# Patient Record
Sex: FEMALE | Race: WHITE | Employment: FULL TIME | URBAN - METROPOLITAN AREA
[De-identification: names, ages, dates, MRNs, and addresses within clinical notes are randomized per-mention and may not be internally consistent; named-entity substitution may affect disease eponyms.]

---

## 2017-02-01 ENCOUNTER — GENERIC CONVERSION - ENCOUNTER (OUTPATIENT)
Dept: OTHER | Facility: OTHER | Age: 32
End: 2017-02-01

## 2017-02-01 DIAGNOSIS — Z00.00 ENCOUNTER FOR GENERAL ADULT MEDICAL EXAMINATION WITHOUT ABNORMAL FINDINGS: ICD-10-CM

## 2017-02-01 DIAGNOSIS — Z01.818 ENCOUNTER FOR OTHER PREPROCEDURAL EXAMINATION: ICD-10-CM

## 2017-02-01 DIAGNOSIS — F32.9 MAJOR DEPRESSIVE DISORDER, SINGLE EPISODE: ICD-10-CM

## 2017-02-10 RX ORDER — ACETAMINOPHEN, ASPIRIN AND CAFFEINE 250; 250; 65 MG/1; MG/1; MG/1
1 TABLET, FILM COATED ORAL EVERY 6 HOURS PRN
COMMUNITY
End: 2017-03-03 | Stop reason: HOSPADM

## 2017-02-20 ENCOUNTER — APPOINTMENT (OUTPATIENT)
Dept: LAB | Facility: HOSPITAL | Age: 32
End: 2017-02-20
Payer: COMMERCIAL

## 2017-02-20 ENCOUNTER — HOSPITAL ENCOUNTER (OUTPATIENT)
Dept: NON INVASIVE DIAGNOSTICS | Facility: HOSPITAL | Age: 32
Discharge: HOME | End: 2017-02-20
Payer: COMMERCIAL

## 2017-02-20 ENCOUNTER — TRANSCRIBE ORDERS (OUTPATIENT)
Dept: ADMINISTRATIVE | Facility: HOSPITAL | Age: 32
End: 2017-02-20

## 2017-02-20 ENCOUNTER — ALLSCRIPTS OFFICE VISIT (OUTPATIENT)
Dept: FAMILY MEDICINE CLINIC | Facility: CLINIC | Age: 32
End: 2017-02-20
Payer: COMMERCIAL

## 2017-02-20 ENCOUNTER — HOSPITAL ENCOUNTER (OUTPATIENT)
Dept: RADIOLOGY | Facility: HOSPITAL | Age: 32
Discharge: HOME | End: 2017-02-20
Payer: COMMERCIAL

## 2017-02-20 DIAGNOSIS — Z01.818 OTHER SPECIFIED PRE-OPERATIVE EXAMINATION: Primary | ICD-10-CM

## 2017-02-20 DIAGNOSIS — Z01.818 ENCOUNTER FOR OTHER PREPROCEDURAL EXAMINATION: ICD-10-CM

## 2017-02-20 DIAGNOSIS — Z01.818 OTHER SPECIFIED PRE-OPERATIVE EXAMINATION: ICD-10-CM

## 2017-02-20 LAB
ANION GAP SERPL CALCULATED.3IONS-SCNC: 10 MMOL/L (ref 4–13)
APTT PPP: 34 SECONDS (ref 24–36)
BILIRUB UR QL STRIP: NEGATIVE
BUN SERPL-MCNC: 7 MG/DL (ref 5–25)
CALCIUM SERPL-MCNC: 8.8 MG/DL (ref 8.3–10.1)
CHLORIDE SERPL-SCNC: 102 MMOL/L (ref 100–108)
CLARITY UR: NORMAL
CO2 SERPL-SCNC: 27 MMOL/L (ref 21–32)
COLOR UR: YELLOW
CREAT SERPL-MCNC: 0.65 MG/DL (ref 0.6–1.3)
ERYTHROCYTE [DISTWIDTH] IN BLOOD BY AUTOMATED COUNT: 12.8 % (ref 11.6–15.1)
GFR SERPL CREATININE-BSD FRML MDRD: >60 ML/MIN/1.73SQ M
GLUCOSE SERPL-MCNC: 112 MG/DL (ref 65–140)
GLUCOSE UR STRIP-MCNC: NEGATIVE MG/DL
HCT VFR BLD AUTO: 40.4 % (ref 34.8–46.1)
HGB BLD-MCNC: 13.2 G/DL (ref 11.5–15.4)
HGB UR QL STRIP.AUTO: NEGATIVE
INR PPP: 1.08 (ref 0.86–1.16)
KETONES UR STRIP-MCNC: NEGATIVE MG/DL
LEUKOCYTE ESTERASE UR QL STRIP: NEGATIVE
MCH RBC QN AUTO: 27.6 PG (ref 26.8–34.3)
MCHC RBC AUTO-ENTMCNC: 32.7 G/DL (ref 31.4–37.4)
MCV RBC AUTO: 84 FL (ref 82–98)
NITRITE UR QL STRIP: NEGATIVE
PH UR STRIP.AUTO: 6.5 [PH] (ref 4.5–8)
PLATELET # BLD AUTO: 277 THOUSANDS/UL (ref 149–390)
PMV BLD AUTO: 12.1 FL (ref 8.9–12.7)
POTASSIUM SERPL-SCNC: 4.8 MMOL/L (ref 3.5–5.3)
PROT UR STRIP-MCNC: NEGATIVE MG/DL
PROTHROMBIN TIME: 13.7 SECONDS (ref 12–14.3)
RBC # BLD AUTO: 4.79 MILLION/UL (ref 3.81–5.12)
SODIUM SERPL-SCNC: 139 MMOL/L (ref 136–145)
SP GR UR STRIP.AUTO: 1.01 (ref 1–1.03)
UROBILINOGEN UR QL STRIP.AUTO: 0.2 E.U./DL
WBC # BLD AUTO: 12.35 THOUSAND/UL (ref 4.31–10.16)

## 2017-02-20 PROCEDURE — 81003 URINALYSIS AUTO W/O SCOPE: CPT

## 2017-02-20 PROCEDURE — 80048 BASIC METABOLIC PNL TOTAL CA: CPT

## 2017-02-20 PROCEDURE — 93005 ELECTROCARDIOGRAM TRACING: CPT

## 2017-02-20 PROCEDURE — 85027 COMPLETE CBC AUTOMATED: CPT

## 2017-02-20 PROCEDURE — 71020 HB CHEST X-RAY 2VW FRONTAL&LATL: CPT

## 2017-02-20 PROCEDURE — 85610 PROTHROMBIN TIME: CPT

## 2017-02-20 PROCEDURE — T1015 CLINIC SERVICE: HCPCS | Performed by: NURSE PRACTITIONER

## 2017-02-20 PROCEDURE — 36415 COLL VENOUS BLD VENIPUNCTURE: CPT

## 2017-02-20 PROCEDURE — 85730 THROMBOPLASTIN TIME PARTIAL: CPT

## 2017-02-21 ENCOUNTER — ANESTHESIA EVENT (OUTPATIENT)
Dept: PERIOP | Facility: HOSPITAL | Age: 32
DRG: 021 | End: 2017-02-21
Payer: COMMERCIAL

## 2017-02-21 LAB
ATRIAL RATE: 78 BPM
P AXIS: -7 DEGREES
PR INTERVAL: 136 MS
QRS AXIS: 70 DEGREES
QRSD INTERVAL: 106 MS
QT INTERVAL: 370 MS
QTC INTERVAL: 421 MS
T WAVE AXIS: 19 DEGREES
VENTRICULAR RATE: 78 BPM

## 2017-02-22 ENCOUNTER — TRANSCRIBE ORDERS (OUTPATIENT)
Dept: ADMINISTRATIVE | Facility: HOSPITAL | Age: 32
End: 2017-02-22

## 2017-02-22 ENCOUNTER — APPOINTMENT (OUTPATIENT)
Dept: LAB | Facility: HOSPITAL | Age: 32
End: 2017-02-22
Attending: NEUROLOGICAL SURGERY
Payer: COMMERCIAL

## 2017-02-22 DIAGNOSIS — D33.3 BENIGN NEOPLASM OF CRANIAL NERVES (HCC): ICD-10-CM

## 2017-02-22 DIAGNOSIS — F32.9 MAJOR DEPRESSIVE DISORDER, SINGLE EPISODE: ICD-10-CM

## 2017-02-22 DIAGNOSIS — Z30.9 ENCOUNTER FOR CONTRACEPTIVE MANAGEMENT: ICD-10-CM

## 2017-02-22 DIAGNOSIS — G93.0 CEREBRAL CYSTS: ICD-10-CM

## 2017-02-22 DIAGNOSIS — Z00.00 ENCOUNTER FOR GENERAL ADULT MEDICAL EXAMINATION WITHOUT ABNORMAL FINDINGS: ICD-10-CM

## 2017-02-22 DIAGNOSIS — G93.0 CEREBRAL CYSTS: Primary | ICD-10-CM

## 2017-02-22 LAB
ALBUMIN SERPL BCP-MCNC: 3.5 G/DL (ref 3.5–5)
ALP SERPL-CCNC: 96 U/L (ref 46–116)
ALT SERPL W P-5'-P-CCNC: 15 U/L (ref 12–78)
ANION GAP SERPL CALCULATED.3IONS-SCNC: 9 MMOL/L (ref 4–13)
AST SERPL W P-5'-P-CCNC: 11 U/L (ref 5–45)
BILIRUB SERPL-MCNC: 0.3 MG/DL (ref 0.2–1)
BILIRUB UR QL STRIP: NEGATIVE
BUN SERPL-MCNC: 10 MG/DL (ref 5–25)
CALCIUM SERPL-MCNC: 8.5 MG/DL (ref 8.3–10.1)
CHLORIDE SERPL-SCNC: 102 MMOL/L (ref 100–108)
CLARITY UR: CLEAR
CO2 SERPL-SCNC: 28 MMOL/L (ref 21–32)
COLOR UR: YELLOW
CREAT SERPL-MCNC: 0.67 MG/DL (ref 0.6–1.3)
EST. AVERAGE GLUCOSE BLD GHB EST-MCNC: 126 MG/DL
GFR SERPL CREATININE-BSD FRML MDRD: >60 ML/MIN/1.73SQ M
GLUCOSE SERPL-MCNC: 100 MG/DL (ref 65–140)
GLUCOSE UR STRIP-MCNC: NEGATIVE MG/DL
HBA1C MFR BLD: 6 % (ref 4.2–6.3)
HCG SERPL QL: NEGATIVE
HGB UR QL STRIP.AUTO: NEGATIVE
KETONES UR STRIP-MCNC: NEGATIVE MG/DL
LEUKOCYTE ESTERASE UR QL STRIP: NEGATIVE
NITRITE UR QL STRIP: NEGATIVE
PH UR STRIP.AUTO: 6.5 [PH] (ref 4.5–8)
POTASSIUM SERPL-SCNC: 3.9 MMOL/L (ref 3.5–5.3)
PROT SERPL-MCNC: 6.6 G/DL (ref 6.4–8.2)
PROT UR STRIP-MCNC: NEGATIVE MG/DL
SODIUM SERPL-SCNC: 139 MMOL/L (ref 136–145)
SP GR UR STRIP.AUTO: 1.02 (ref 1–1.03)
UROBILINOGEN UR QL STRIP.AUTO: 0.2 E.U./DL

## 2017-02-22 PROCEDURE — 86900 BLOOD TYPING SEROLOGIC ABO: CPT | Performed by: NEUROLOGICAL SURGERY

## 2017-02-22 PROCEDURE — 80053 COMPREHEN METABOLIC PANEL: CPT

## 2017-02-22 PROCEDURE — 83036 HEMOGLOBIN GLYCOSYLATED A1C: CPT

## 2017-02-22 PROCEDURE — 84703 CHORIONIC GONADOTROPIN ASSAY: CPT

## 2017-02-22 PROCEDURE — 86901 BLOOD TYPING SEROLOGIC RH(D): CPT | Performed by: NEUROLOGICAL SURGERY

## 2017-02-22 PROCEDURE — 86850 RBC ANTIBODY SCREEN: CPT | Performed by: NEUROLOGICAL SURGERY

## 2017-02-22 PROCEDURE — 81003 URINALYSIS AUTO W/O SCOPE: CPT

## 2017-02-22 PROCEDURE — 36415 COLL VENOUS BLD VENIPUNCTURE: CPT

## 2017-02-23 ENCOUNTER — LAB REQUISITION (OUTPATIENT)
Dept: LAB | Facility: HOSPITAL | Age: 32
End: 2017-02-23
Payer: COMMERCIAL

## 2017-02-23 DIAGNOSIS — Z01.818 ENCOUNTER FOR OTHER PREPROCEDURAL EXAMINATION: ICD-10-CM

## 2017-02-23 LAB
ABO GROUP BLD: NORMAL
BLD GP AB SCN SERPL QL: NEGATIVE
RH BLD: POSITIVE

## 2017-02-28 ENCOUNTER — GENERIC CONVERSION - ENCOUNTER (OUTPATIENT)
Dept: OTHER | Facility: OTHER | Age: 32
End: 2017-02-28

## 2017-03-01 ENCOUNTER — APPOINTMENT (INPATIENT)
Dept: RADIOLOGY | Facility: HOSPITAL | Age: 32
DRG: 021 | End: 2017-03-01
Payer: COMMERCIAL

## 2017-03-01 ENCOUNTER — GENERIC CONVERSION - ENCOUNTER (OUTPATIENT)
Dept: OTHER | Facility: OTHER | Age: 32
End: 2017-03-01

## 2017-03-01 ENCOUNTER — ANESTHESIA (OUTPATIENT)
Dept: PERIOP | Facility: HOSPITAL | Age: 32
DRG: 021 | End: 2017-03-01
Payer: COMMERCIAL

## 2017-03-01 ENCOUNTER — HOSPITAL ENCOUNTER (INPATIENT)
Facility: HOSPITAL | Age: 32
LOS: 2 days | Discharge: HOME | DRG: 021 | End: 2017-03-03
Attending: NEUROLOGICAL SURGERY | Admitting: NEUROLOGICAL SURGERY
Payer: COMMERCIAL

## 2017-03-01 DIAGNOSIS — G93.0 CEREBRAL CYST: Primary | ICD-10-CM

## 2017-03-01 LAB
BASE EXCESS BLDA CALC-SCNC: -3 MMOL/L (ref -2–3)
CA-I BLD-SCNC: 1 MMOL/L (ref 1.12–1.32)
ERYTHROCYTE [DISTWIDTH] IN BLOOD BY AUTOMATED COUNT: 13.2 % (ref 11.6–15.1)
EXT PREGNANCY TEST URINE: NEGATIVE
GLUCOSE SERPL-MCNC: 162 MG/DL (ref 65–140)
GLUCOSE SERPL-MCNC: 163 MG/DL (ref 65–140)
GLUCOSE SERPL-MCNC: 207 MG/DL (ref 65–140)
HCO3 BLDA-SCNC: 21.7 MMOL/L (ref 24–30)
HCT VFR BLD AUTO: 31.7 % (ref 34.8–46.1)
HCT VFR BLD CALC: 27 % (ref 34.8–46.1)
HGB BLD-MCNC: 10.2 G/DL (ref 11.5–15.4)
HGB BLDA-MCNC: 9.2 G/DL (ref 11.5–15.4)
MCH RBC QN AUTO: 27.1 PG (ref 26.8–34.3)
MCHC RBC AUTO-ENTMCNC: 32.2 G/DL (ref 31.4–37.4)
MCV RBC AUTO: 84 FL (ref 82–98)
PCO2 BLD: 23 MMOL/L (ref 21–32)
PCO2 BLD: 38.3 MM HG (ref 42–50)
PH BLD: 7.36 [PH] (ref 7.3–7.4)
PLATELET # BLD AUTO: 217 THOUSANDS/UL (ref 149–390)
PLATELET # BLD AUTO: 277 THOUSANDS/UL (ref 149–390)
PMV BLD AUTO: 11.5 FL (ref 8.9–12.7)
PMV BLD AUTO: 11.6 FL (ref 8.9–12.7)
PO2 BLD: 214 MM HG (ref 35–45)
POTASSIUM BLD-SCNC: 4.2 MMOL/L (ref 3.5–5.3)
RBC # BLD AUTO: 3.76 MILLION/UL (ref 3.81–5.12)
SAO2 % BLD FROM PO2: 100 % (ref 95–98)
SODIUM BLD-SCNC: 136 MMOL/L (ref 136–145)
SPECIMEN SOURCE: ABNORMAL
WBC # BLD AUTO: 17.05 THOUSAND/UL (ref 4.31–10.16)

## 2017-03-01 PROCEDURE — 84132 ASSAY OF SERUM POTASSIUM: CPT

## 2017-03-01 PROCEDURE — C1713 ANCHOR/SCREW BN/BN,TIS/BN: HCPCS | Performed by: NEUROLOGICAL SURGERY

## 2017-03-01 PROCEDURE — 82948 REAGENT STRIP/BLOOD GLUCOSE: CPT

## 2017-03-01 PROCEDURE — 81025 URINE PREGNANCY TEST: CPT | Performed by: STUDENT IN AN ORGANIZED HEALTH CARE EDUCATION/TRAINING PROGRAM

## 2017-03-01 PROCEDURE — 85014 HEMATOCRIT: CPT

## 2017-03-01 PROCEDURE — 82803 BLOOD GASES ANY COMBINATION: CPT

## 2017-03-01 PROCEDURE — 009500Z DRAINAGE OF INTRACRANIAL SUBARACHNOID SPACE WITH DRAINAGE DEVICE, OPEN APPROACH: ICD-10-PCS | Performed by: NEUROLOGICAL SURGERY

## 2017-03-01 PROCEDURE — 85049 AUTOMATED PLATELET COUNT: CPT | Performed by: NEUROLOGICAL SURGERY

## 2017-03-01 PROCEDURE — 86920 COMPATIBILITY TEST SPIN: CPT

## 2017-03-01 PROCEDURE — 85027 COMPLETE CBC AUTOMATED: CPT | Performed by: NURSE ANESTHETIST, CERTIFIED REGISTERED

## 2017-03-01 PROCEDURE — 82947 ASSAY GLUCOSE BLOOD QUANT: CPT

## 2017-03-01 PROCEDURE — 70450 CT HEAD/BRAIN W/O DYE: CPT

## 2017-03-01 PROCEDURE — 82330 ASSAY OF CALCIUM: CPT

## 2017-03-01 PROCEDURE — 84295 ASSAY OF SERUM SODIUM: CPT

## 2017-03-01 DEVICE — IMPLANTABLE DEVICE
Type: IMPLANTABLE DEVICE | Site: CRANIAL | Status: FUNCTIONAL
Brand: THINFLAP

## 2017-03-01 DEVICE — IMPLANTABLE DEVICE
Type: IMPLANTABLE DEVICE | Site: CRANIAL | Status: FUNCTIONAL
Brand: THINFLAP SYSTEM

## 2017-03-01 RX ORDER — HYDROMORPHONE HYDROCHLORIDE 2 MG/ML
INJECTION, SOLUTION INTRAMUSCULAR; INTRAVENOUS; SUBCUTANEOUS AS NEEDED
Status: DISCONTINUED | OUTPATIENT
Start: 2017-03-01 | End: 2017-03-01 | Stop reason: SURG

## 2017-03-01 RX ORDER — FENTANYL CITRATE 50 UG/ML
50 INJECTION, SOLUTION INTRAMUSCULAR; INTRAVENOUS
Status: DISCONTINUED | OUTPATIENT
Start: 2017-03-01 | End: 2017-03-02

## 2017-03-01 RX ORDER — FENTANYL CITRATE/PF 50 MCG/ML
50 SYRINGE (ML) INJECTION
Status: DISCONTINUED | OUTPATIENT
Start: 2017-03-01 | End: 2017-03-01 | Stop reason: HOSPADM

## 2017-03-01 RX ORDER — LIDOCAINE HYDROCHLORIDE 10 MG/ML
INJECTION, SOLUTION INFILTRATION; PERINEURAL AS NEEDED
Status: DISCONTINUED | OUTPATIENT
Start: 2017-03-01 | End: 2017-03-01 | Stop reason: HOSPADM

## 2017-03-01 RX ORDER — MAGNESIUM HYDROXIDE 1200 MG/15ML
LIQUID ORAL AS NEEDED
Status: DISCONTINUED | OUTPATIENT
Start: 2017-03-01 | End: 2017-03-01 | Stop reason: HOSPADM

## 2017-03-01 RX ORDER — LIDOCAINE HYDROCHLORIDE AND EPINEPHRINE 10; 10 MG/ML; UG/ML
INJECTION, SOLUTION INFILTRATION; PERINEURAL AS NEEDED
Status: DISCONTINUED | OUTPATIENT
Start: 2017-03-01 | End: 2017-03-01 | Stop reason: HOSPADM

## 2017-03-01 RX ORDER — DEXAMETHASONE 4 MG/1
4 TABLET ORAL EVERY 8 HOURS SCHEDULED
Status: DISCONTINUED | OUTPATIENT
Start: 2017-03-03 | End: 2017-03-03 | Stop reason: HOSPADM

## 2017-03-01 RX ORDER — ACETAMINOPHEN 325 MG/1
975 TABLET ORAL ONCE
Status: COMPLETED | OUTPATIENT
Start: 2017-03-01 | End: 2017-03-01

## 2017-03-01 RX ORDER — DEXAMETHASONE 2 MG/1
2 TABLET ORAL EVERY 6 HOURS SCHEDULED
Status: DISCONTINUED | OUTPATIENT
Start: 2017-03-05 | End: 2017-03-03 | Stop reason: HOSPADM

## 2017-03-01 RX ORDER — ONDANSETRON 2 MG/ML
INJECTION INTRAMUSCULAR; INTRAVENOUS AS NEEDED
Status: DISCONTINUED | OUTPATIENT
Start: 2017-03-01 | End: 2017-03-01 | Stop reason: SURG

## 2017-03-01 RX ORDER — ESMOLOL HYDROCHLORIDE 10 MG/ML
INJECTION INTRAVENOUS AS NEEDED
Status: DISCONTINUED | OUTPATIENT
Start: 2017-03-01 | End: 2017-03-01 | Stop reason: SURG

## 2017-03-01 RX ORDER — OXYCODONE HYDROCHLORIDE 10 MG/1
10 TABLET ORAL EVERY 4 HOURS PRN
Status: DISCONTINUED | OUTPATIENT
Start: 2017-03-01 | End: 2017-03-02

## 2017-03-01 RX ORDER — SODIUM CHLORIDE 9 MG/ML
75 INJECTION, SOLUTION INTRAVENOUS CONTINUOUS
Status: DISCONTINUED | OUTPATIENT
Start: 2017-03-01 | End: 2017-03-02

## 2017-03-01 RX ORDER — LIDOCAINE HYDROCHLORIDE 10 MG/ML
INJECTION, SOLUTION INFILTRATION; PERINEURAL AS NEEDED
Status: DISCONTINUED | OUTPATIENT
Start: 2017-03-01 | End: 2017-03-01 | Stop reason: SURG

## 2017-03-01 RX ORDER — PROPOFOL 10 MG/ML
INJECTION, EMULSION INTRAVENOUS AS NEEDED
Status: DISCONTINUED | OUTPATIENT
Start: 2017-03-01 | End: 2017-03-01 | Stop reason: SURG

## 2017-03-01 RX ORDER — OXYCODONE HCL 10 MG/1
10 TABLET, FILM COATED, EXTENDED RELEASE ORAL ONCE
Status: COMPLETED | OUTPATIENT
Start: 2017-03-01 | End: 2017-03-01

## 2017-03-01 RX ORDER — DEXAMETHASONE 2 MG/1
2 TABLET ORAL
Status: DISCONTINUED | OUTPATIENT
Start: 2017-03-11 | End: 2017-03-03 | Stop reason: HOSPADM

## 2017-03-01 RX ORDER — SODIUM CHLORIDE, SODIUM LACTATE, POTASSIUM CHLORIDE, CALCIUM CHLORIDE 600; 310; 30; 20 MG/100ML; MG/100ML; MG/100ML; MG/100ML
125 INJECTION, SOLUTION INTRAVENOUS CONTINUOUS
Status: DISCONTINUED | OUTPATIENT
Start: 2017-03-01 | End: 2017-03-01

## 2017-03-01 RX ORDER — ACETAMINOPHEN 325 MG/1
975 TABLET ORAL EVERY 8 HOURS SCHEDULED
Status: DISCONTINUED | OUTPATIENT
Start: 2017-03-01 | End: 2017-03-03

## 2017-03-01 RX ORDER — ROCURONIUM BROMIDE 10 MG/ML
INJECTION, SOLUTION INTRAVENOUS AS NEEDED
Status: DISCONTINUED | OUTPATIENT
Start: 2017-03-01 | End: 2017-03-01 | Stop reason: SURG

## 2017-03-01 RX ORDER — PROMETHAZINE HYDROCHLORIDE 25 MG/ML
6.25 INJECTION, SOLUTION INTRAMUSCULAR; INTRAVENOUS AS NEEDED
Status: DISCONTINUED | OUTPATIENT
Start: 2017-03-01 | End: 2017-03-01 | Stop reason: HOSPADM

## 2017-03-01 RX ORDER — ALBUTEROL SULFATE 2.5 MG/3ML
2.5 SOLUTION RESPIRATORY (INHALATION) AS NEEDED
Status: DISCONTINUED | OUTPATIENT
Start: 2017-03-01 | End: 2017-03-01 | Stop reason: HOSPADM

## 2017-03-01 RX ORDER — DEXAMETHASONE 4 MG/1
4 TABLET ORAL EVERY 6 HOURS SCHEDULED
Status: COMPLETED | OUTPATIENT
Start: 2017-03-01 | End: 2017-03-03

## 2017-03-01 RX ORDER — DEXAMETHASONE 2 MG/1
2 TABLET ORAL EVERY 8 HOURS SCHEDULED
Status: DISCONTINUED | OUTPATIENT
Start: 2017-03-07 | End: 2017-03-03 | Stop reason: HOSPADM

## 2017-03-01 RX ORDER — SUCCINYLCHOLINE CHLORIDE 20 MG/ML
INJECTION INTRAMUSCULAR; INTRAVENOUS AS NEEDED
Status: DISCONTINUED | OUTPATIENT
Start: 2017-03-01 | End: 2017-03-01 | Stop reason: SURG

## 2017-03-01 RX ORDER — ONDANSETRON 2 MG/ML
4 INJECTION INTRAMUSCULAR; INTRAVENOUS EVERY 4 HOURS PRN
Status: DISCONTINUED | OUTPATIENT
Start: 2017-03-01 | End: 2017-03-02

## 2017-03-01 RX ORDER — IBUPROFEN 800 MG/1
800 TABLET ORAL EVERY 6 HOURS PRN
COMMUNITY
End: 2017-03-03 | Stop reason: HOSPADM

## 2017-03-01 RX ORDER — DIAZEPAM 2 MG/1
2 TABLET ORAL 4 TIMES DAILY
Status: DISCONTINUED | OUTPATIENT
Start: 2017-03-01 | End: 2017-03-03

## 2017-03-01 RX ORDER — DIAZEPAM 10 MG/1
10 TABLET ORAL EVERY 6 HOURS PRN
COMMUNITY
End: 2017-03-03 | Stop reason: HOSPADM

## 2017-03-01 RX ORDER — SODIUM CHLORIDE 9 MG/ML
INJECTION, SOLUTION INTRAVENOUS CONTINUOUS PRN
Status: DISCONTINUED | OUTPATIENT
Start: 2017-03-01 | End: 2017-03-01 | Stop reason: SURG

## 2017-03-01 RX ORDER — BISACODYL 10 MG
10 SUPPOSITORY, RECTAL RECTAL AS NEEDED
Status: DISCONTINUED | OUTPATIENT
Start: 2017-03-01 | End: 2017-03-02

## 2017-03-01 RX ORDER — PREGABALIN 75 MG/1
150 CAPSULE ORAL ONCE
Status: COMPLETED | OUTPATIENT
Start: 2017-03-01 | End: 2017-03-01

## 2017-03-01 RX ORDER — BUPIVACAINE HYDROCHLORIDE AND EPINEPHRINE 5; 5 MG/ML; UG/ML
INJECTION, SOLUTION EPIDURAL; INTRACAUDAL; PERINEURAL AS NEEDED
Status: DISCONTINUED | OUTPATIENT
Start: 2017-03-01 | End: 2017-03-01 | Stop reason: HOSPADM

## 2017-03-01 RX ORDER — METOCLOPRAMIDE HYDROCHLORIDE 5 MG/ML
10 INJECTION INTRAMUSCULAR; INTRAVENOUS EVERY 6 HOURS PRN
Status: DISCONTINUED | OUTPATIENT
Start: 2017-03-01 | End: 2017-03-01 | Stop reason: HOSPADM

## 2017-03-01 RX ORDER — GABAPENTIN 300 MG/1
300 CAPSULE ORAL 3 TIMES DAILY
Status: DISCONTINUED | OUTPATIENT
Start: 2017-03-01 | End: 2017-03-03 | Stop reason: HOSPADM

## 2017-03-01 RX ORDER — CHLORHEXIDINE GLUCONATE 0.12 MG/ML
15 RINSE ORAL ONCE
Status: COMPLETED | OUTPATIENT
Start: 2017-03-01 | End: 2017-03-01

## 2017-03-01 RX ORDER — DOCUSATE SODIUM 100 MG/1
100 CAPSULE, LIQUID FILLED ORAL 2 TIMES DAILY
Status: DISCONTINUED | OUTPATIENT
Start: 2017-03-01 | End: 2017-03-03 | Stop reason: HOSPADM

## 2017-03-01 RX ORDER — DEXAMETHASONE 2 MG/1
2 TABLET ORAL EVERY 12 HOURS SCHEDULED
Status: DISCONTINUED | OUTPATIENT
Start: 2017-03-09 | End: 2017-03-03 | Stop reason: HOSPADM

## 2017-03-01 RX ORDER — ALBUMIN, HUMAN INJ 5% 5 %
SOLUTION INTRAVENOUS CONTINUOUS PRN
Status: DISCONTINUED | OUTPATIENT
Start: 2017-03-01 | End: 2017-03-01 | Stop reason: SURG

## 2017-03-01 RX ORDER — HEPARIN SODIUM 5000 [USP'U]/ML
5000 INJECTION, SOLUTION INTRAVENOUS; SUBCUTANEOUS EVERY 8 HOURS SCHEDULED
Status: DISCONTINUED | OUTPATIENT
Start: 2017-03-02 | End: 2017-03-03 | Stop reason: HOSPADM

## 2017-03-01 RX ORDER — LABETALOL HYDROCHLORIDE 5 MG/ML
INJECTION, SOLUTION INTRAVENOUS AS NEEDED
Status: DISCONTINUED | OUTPATIENT
Start: 2017-03-01 | End: 2017-03-01 | Stop reason: SURG

## 2017-03-01 RX ORDER — ONDANSETRON 2 MG/ML
4 INJECTION INTRAMUSCULAR; INTRAVENOUS ONCE AS NEEDED
Status: COMPLETED | OUTPATIENT
Start: 2017-03-01 | End: 2017-03-01

## 2017-03-01 RX ADMIN — GABAPENTIN 300 MG: 300 CAPSULE ORAL at 15:06

## 2017-03-01 RX ADMIN — SODIUM CHLORIDE: 0.9 INJECTION, SOLUTION INTRAVENOUS at 08:45

## 2017-03-01 RX ADMIN — OXYCODONE HYDROCHLORIDE 10 MG: 10 TABLET ORAL at 21:07

## 2017-03-01 RX ADMIN — SODIUM CHLORIDE 75 ML/HR: 0.9 INJECTION, SOLUTION INTRAVENOUS at 13:30

## 2017-03-01 RX ADMIN — ALBUMIN HUMAN: 0.05 INJECTION, SOLUTION INTRAVENOUS at 10:49

## 2017-03-01 RX ADMIN — ACETAMINOPHEN 975 MG: 325 TABLET, FILM COATED ORAL at 07:25

## 2017-03-01 RX ADMIN — HYDROMORPHONE HYDROCHLORIDE 0.2 MG: 2 INJECTION, SOLUTION INTRAMUSCULAR; INTRAVENOUS; SUBCUTANEOUS at 11:14

## 2017-03-01 RX ADMIN — HYDROMORPHONE HYDROCHLORIDE 0.2 MG: 2 INJECTION, SOLUTION INTRAMUSCULAR; INTRAVENOUS; SUBCUTANEOUS at 11:42

## 2017-03-01 RX ADMIN — DIAZEPAM 2 MG: 2 TABLET ORAL at 20:59

## 2017-03-01 RX ADMIN — INSULIN HUMAN 5 UNITS: 100 INJECTION, SOLUTION PARENTERAL at 12:30

## 2017-03-01 RX ADMIN — DOCUSATE SODIUM 100 MG: 100 CAPSULE, LIQUID FILLED ORAL at 17:24

## 2017-03-01 RX ADMIN — HYDROMORPHONE HYDROCHLORIDE 0.2 MG: 2 INJECTION, SOLUTION INTRAMUSCULAR; INTRAVENOUS; SUBCUTANEOUS at 08:45

## 2017-03-01 RX ADMIN — ONDANSETRON 4 MG: 2 INJECTION INTRAMUSCULAR; INTRAVENOUS at 12:43

## 2017-03-01 RX ADMIN — DEXAMETHASONE SODIUM PHOSPHATE 10 MG: 10 INJECTION INTRAMUSCULAR; INTRAVENOUS at 09:25

## 2017-03-01 RX ADMIN — DIAZEPAM 2 MG: 2 TABLET ORAL at 15:06

## 2017-03-01 RX ADMIN — GABAPENTIN 300 MG: 300 CAPSULE ORAL at 21:06

## 2017-03-01 RX ADMIN — ESMOLOL HYDROCHLORIDE 20 MG: 100 INJECTION, SOLUTION INTRAVENOUS at 09:43

## 2017-03-01 RX ADMIN — PROPOFOL 200 MG: 10 INJECTION, EMULSION INTRAVENOUS at 08:36

## 2017-03-01 RX ADMIN — ONDANSETRON 4 MG: 2 INJECTION INTRAMUSCULAR; INTRAVENOUS at 21:05

## 2017-03-01 RX ADMIN — DEXAMETHASONE 4 MG: 4 TABLET ORAL at 21:00

## 2017-03-01 RX ADMIN — CEFAZOLIN SODIUM 2000 MG: 2 SOLUTION INTRAVENOUS at 09:25

## 2017-03-01 RX ADMIN — ALBUMIN HUMAN: 0.05 INJECTION, SOLUTION INTRAVENOUS at 10:23

## 2017-03-01 RX ADMIN — HYDROMORPHONE HYDROCHLORIDE 1 MG: 1 INJECTION, SOLUTION INTRAMUSCULAR; INTRAVENOUS; SUBCUTANEOUS at 18:24

## 2017-03-01 RX ADMIN — HYDROMORPHONE HYDROCHLORIDE 0.2 MG: 2 INJECTION, SOLUTION INTRAMUSCULAR; INTRAVENOUS; SUBCUTANEOUS at 11:27

## 2017-03-01 RX ADMIN — ALBUMIN HUMAN: 0.05 INJECTION, SOLUTION INTRAVENOUS at 10:10

## 2017-03-01 RX ADMIN — HYDROMORPHONE HYDROCHLORIDE 0.2 MG: 2 INJECTION, SOLUTION INTRAMUSCULAR; INTRAVENOUS; SUBCUTANEOUS at 11:29

## 2017-03-01 RX ADMIN — LIDOCAINE HYDROCHLORIDE 20 MG: 10 INJECTION, SOLUTION INFILTRATION; PERINEURAL at 08:36

## 2017-03-01 RX ADMIN — HYDROMORPHONE HYDROCHLORIDE 0.4 MG: 2 INJECTION, SOLUTION INTRAMUSCULAR; INTRAVENOUS; SUBCUTANEOUS at 08:36

## 2017-03-01 RX ADMIN — PROPOFOL 100 MG: 10 INJECTION, EMULSION INTRAVENOUS at 08:48

## 2017-03-01 RX ADMIN — NEOMYCIN AND POLYMYXIN B SULFATES: 40; 200000 SOLUTION IRRIGATION at 08:00

## 2017-03-01 RX ADMIN — ONDANSETRON 4 MG: 2 INJECTION INTRAMUSCULAR; INTRAVENOUS at 17:24

## 2017-03-01 RX ADMIN — HYDROMORPHONE HYDROCHLORIDE 0.4 MG: 2 INJECTION, SOLUTION INTRAMUSCULAR; INTRAVENOUS; SUBCUTANEOUS at 11:33

## 2017-03-01 RX ADMIN — OXYCODONE HYDROCHLORIDE 10 MG: 10 TABLET, FILM COATED, EXTENDED RELEASE ORAL at 08:20

## 2017-03-01 RX ADMIN — DEXAMETHASONE 4 MG: 4 TABLET ORAL at 15:06

## 2017-03-01 RX ADMIN — HYDROMORPHONE HYDROCHLORIDE 0.4 MG: 2 INJECTION, SOLUTION INTRAMUSCULAR; INTRAVENOUS; SUBCUTANEOUS at 08:48

## 2017-03-01 RX ADMIN — SODIUM CHLORIDE, SODIUM LACTATE, POTASSIUM CHLORIDE, AND CALCIUM CHLORIDE: .6; .31; .03; .02 INJECTION, SOLUTION INTRAVENOUS at 10:22

## 2017-03-01 RX ADMIN — FENTANYL CITRATE 50 MCG: 50 INJECTION INTRAMUSCULAR; INTRAVENOUS at 12:54

## 2017-03-01 RX ADMIN — HYDROMORPHONE HYDROCHLORIDE 0.4 MG: 2 INJECTION, SOLUTION INTRAMUSCULAR; INTRAVENOUS; SUBCUTANEOUS at 11:40

## 2017-03-01 RX ADMIN — ESMOLOL HYDROCHLORIDE 30 MG: 100 INJECTION, SOLUTION INTRAVENOUS at 11:00

## 2017-03-01 RX ADMIN — PREGABALIN 150 MG: 75 CAPSULE ORAL at 08:21

## 2017-03-01 RX ADMIN — LABETALOL HYDROCHLORIDE 10 MG: 5 INJECTION, SOLUTION INTRAVENOUS at 09:43

## 2017-03-01 RX ADMIN — CEFAZOLIN SODIUM 1000 MG: 1 SOLUTION INTRAVENOUS at 17:23

## 2017-03-01 RX ADMIN — SUCCINYLCHOLINE CHLORIDE 120 MG: 20 INJECTION, SOLUTION INTRAMUSCULAR; INTRAVENOUS at 08:36

## 2017-03-01 RX ADMIN — SODIUM CHLORIDE, SODIUM LACTATE, POTASSIUM CHLORIDE, AND CALCIUM CHLORIDE: .6; .31; .03; .02 INJECTION, SOLUTION INTRAVENOUS at 08:04

## 2017-03-01 RX ADMIN — HYDROMORPHONE HYDROCHLORIDE 0.4 MG: 2 INJECTION, SOLUTION INTRAMUSCULAR; INTRAVENOUS; SUBCUTANEOUS at 11:20

## 2017-03-01 RX ADMIN — FENTANYL CITRATE 50 MCG: 50 INJECTION INTRAMUSCULAR; INTRAVENOUS at 21:05

## 2017-03-01 RX ADMIN — ONDANSETRON 4 MG: 2 INJECTION INTRAMUSCULAR; INTRAVENOUS at 11:44

## 2017-03-01 RX ADMIN — ACETAMINOPHEN 975 MG: 325 TABLET, FILM COATED ORAL at 15:07

## 2017-03-01 RX ADMIN — CHLORHEXIDINE GLUCONATE 15 ML: 1.2 RINSE ORAL at 07:26

## 2017-03-01 RX ADMIN — ROCURONIUM BROMIDE 30 MG: 10 INJECTION, SOLUTION INTRAVENOUS at 08:38

## 2017-03-02 ENCOUNTER — APPOINTMENT (INPATIENT)
Dept: PHYSICAL THERAPY | Facility: HOSPITAL | Age: 32
DRG: 021 | End: 2017-03-02
Payer: COMMERCIAL

## 2017-03-02 ENCOUNTER — APPOINTMENT (INPATIENT)
Dept: OCCUPATIONAL THERAPY | Facility: HOSPITAL | Age: 32
DRG: 021 | End: 2017-03-02
Payer: COMMERCIAL

## 2017-03-02 LAB
ABO GROUP BLD BPU: NORMAL
ABO GROUP BLD BPU: NORMAL
ANION GAP SERPL CALCULATED.3IONS-SCNC: 9 MMOL/L (ref 4–13)
APTT PPP: 28 SECONDS (ref 24–36)
BPU ID: NORMAL
BPU ID: NORMAL
BUN SERPL-MCNC: 7 MG/DL (ref 5–25)
CALCIUM SERPL-MCNC: 8 MG/DL (ref 8.3–10.1)
CHLORIDE SERPL-SCNC: 108 MMOL/L (ref 100–108)
CO2 SERPL-SCNC: 24 MMOL/L (ref 21–32)
CREAT SERPL-MCNC: 0.72 MG/DL (ref 0.6–1.3)
CROSSMATCH: NORMAL
CROSSMATCH: NORMAL
ERYTHROCYTE [DISTWIDTH] IN BLOOD BY AUTOMATED COUNT: 13.1 % (ref 11.6–15.1)
GFR SERPL CREATININE-BSD FRML MDRD: >60 ML/MIN/1.73SQ M
GLUCOSE SERPL-MCNC: 147 MG/DL (ref 65–140)
HCT VFR BLD AUTO: 32.8 % (ref 34.8–46.1)
HGB BLD-MCNC: 10.7 G/DL (ref 11.5–15.4)
INR PPP: 1.03 (ref 0.86–1.16)
MCH RBC QN AUTO: 27.5 PG (ref 26.8–34.3)
MCHC RBC AUTO-ENTMCNC: 32.6 G/DL (ref 31.4–37.4)
MCV RBC AUTO: 84 FL (ref 82–98)
PLATELET # BLD AUTO: 279 THOUSANDS/UL (ref 149–390)
PMV BLD AUTO: 11.2 FL (ref 8.9–12.7)
POTASSIUM SERPL-SCNC: 4 MMOL/L (ref 3.5–5.3)
PROTHROMBIN TIME: 13.6 SECONDS (ref 12–14.3)
RBC # BLD AUTO: 3.89 MILLION/UL (ref 3.81–5.12)
SODIUM SERPL-SCNC: 141 MMOL/L (ref 136–145)
UNIT DISPENSE STATUS: NORMAL
UNIT DISPENSE STATUS: NORMAL
UNIT PRODUCT CODE: NORMAL
UNIT PRODUCT CODE: NORMAL
UNIT RH: NORMAL
UNIT RH: NORMAL
WBC # BLD AUTO: 21.25 THOUSAND/UL (ref 4.31–10.16)

## 2017-03-02 PROCEDURE — 85027 COMPLETE CBC AUTOMATED: CPT | Performed by: NEUROLOGICAL SURGERY

## 2017-03-02 PROCEDURE — 85730 THROMBOPLASTIN TIME PARTIAL: CPT | Performed by: NEUROLOGICAL SURGERY

## 2017-03-02 PROCEDURE — 97165 OT EVAL LOW COMPLEX 30 MIN: CPT

## 2017-03-02 PROCEDURE — 80048 BASIC METABOLIC PNL TOTAL CA: CPT | Performed by: NEUROLOGICAL SURGERY

## 2017-03-02 PROCEDURE — 97163 PT EVAL HIGH COMPLEX 45 MIN: CPT

## 2017-03-02 PROCEDURE — G8979 MOBILITY GOAL STATUS: HCPCS

## 2017-03-02 PROCEDURE — 85610 PROTHROMBIN TIME: CPT | Performed by: NEUROLOGICAL SURGERY

## 2017-03-02 PROCEDURE — G8978 MOBILITY CURRENT STATUS: HCPCS

## 2017-03-02 PROCEDURE — G8987 SELF CARE CURRENT STATUS: HCPCS

## 2017-03-02 PROCEDURE — G8989 SELF CARE D/C STATUS: HCPCS

## 2017-03-02 PROCEDURE — G8988 SELF CARE GOAL STATUS: HCPCS

## 2017-03-02 RX ORDER — OXYCODONE HYDROCHLORIDE 10 MG/1
10 TABLET ORAL EVERY 4 HOURS PRN
Status: DISCONTINUED | OUTPATIENT
Start: 2017-03-02 | End: 2017-03-03 | Stop reason: HOSPADM

## 2017-03-02 RX ORDER — ONDANSETRON 4 MG/1
4 TABLET, ORALLY DISINTEGRATING ORAL EVERY 6 HOURS PRN
Status: DISCONTINUED | OUTPATIENT
Start: 2017-03-02 | End: 2017-03-03 | Stop reason: HOSPADM

## 2017-03-02 RX ORDER — OXYCODONE HYDROCHLORIDE 10 MG/1
10 TABLET ORAL
Status: DISCONTINUED | OUTPATIENT
Start: 2017-03-02 | End: 2017-03-02

## 2017-03-02 RX ADMIN — SODIUM CHLORIDE 75 ML/HR: 0.9 INJECTION, SOLUTION INTRAVENOUS at 00:35

## 2017-03-02 RX ADMIN — GABAPENTIN 300 MG: 300 CAPSULE ORAL at 16:17

## 2017-03-02 RX ADMIN — OXYCODONE HYDROCHLORIDE 10 MG: 10 TABLET ORAL at 05:24

## 2017-03-02 RX ADMIN — DOCUSATE SODIUM 100 MG: 100 CAPSULE, LIQUID FILLED ORAL at 17:51

## 2017-03-02 RX ADMIN — DEXAMETHASONE 4 MG: 4 TABLET ORAL at 17:51

## 2017-03-02 RX ADMIN — ONDANSETRON 4 MG: 2 INJECTION INTRAMUSCULAR; INTRAVENOUS at 00:09

## 2017-03-02 RX ADMIN — DIAZEPAM 2 MG: 2 TABLET ORAL at 19:57

## 2017-03-02 RX ADMIN — DEXAMETHASONE 4 MG: 4 TABLET ORAL at 12:28

## 2017-03-02 RX ADMIN — OXYCODONE HYDROCHLORIDE 15 MG: 5 TABLET ORAL at 08:44

## 2017-03-02 RX ADMIN — GABAPENTIN 300 MG: 300 CAPSULE ORAL at 08:32

## 2017-03-02 RX ADMIN — FENTANYL CITRATE 50 MCG: 50 INJECTION INTRAMUSCULAR; INTRAVENOUS at 03:17

## 2017-03-02 RX ADMIN — HEPARIN SODIUM 5000 UNITS: 5000 INJECTION, SOLUTION INTRAVENOUS; SUBCUTANEOUS at 22:06

## 2017-03-02 RX ADMIN — CEFAZOLIN SODIUM 1000 MG: 1 SOLUTION INTRAVENOUS at 00:34

## 2017-03-02 RX ADMIN — OXYCODONE HYDROCHLORIDE 15 MG: 5 TABLET ORAL at 00:22

## 2017-03-02 RX ADMIN — DEXAMETHASONE 4 MG: 4 TABLET ORAL at 23:51

## 2017-03-02 RX ADMIN — OXYCODONE HYDROCHLORIDE 10 MG: 10 TABLET ORAL at 08:46

## 2017-03-02 RX ADMIN — DEXAMETHASONE 4 MG: 4 TABLET ORAL at 05:06

## 2017-03-02 RX ADMIN — FENTANYL CITRATE 50 MCG: 50 INJECTION INTRAMUSCULAR; INTRAVENOUS at 00:23

## 2017-03-02 RX ADMIN — DIAZEPAM 2 MG: 2 TABLET ORAL at 00:23

## 2017-03-02 RX ADMIN — ONDANSETRON 4 MG: 2 INJECTION INTRAMUSCULAR; INTRAVENOUS at 08:47

## 2017-03-02 RX ADMIN — OXYCODONE HYDROCHLORIDE 10 MG: 10 TABLET ORAL at 16:17

## 2017-03-02 RX ADMIN — DIAZEPAM 2 MG: 2 TABLET ORAL at 12:28

## 2017-03-02 RX ADMIN — ACETAMINOPHEN 975 MG: 325 TABLET, FILM COATED ORAL at 00:22

## 2017-03-02 RX ADMIN — DEXAMETHASONE 4 MG: 4 TABLET ORAL at 00:23

## 2017-03-02 RX ADMIN — DIAZEPAM 2 MG: 2 TABLET ORAL at 08:32

## 2017-03-02 RX ADMIN — ACETAMINOPHEN 975 MG: 325 TABLET, FILM COATED ORAL at 22:06

## 2017-03-02 RX ADMIN — HEPARIN SODIUM 5000 UNITS: 5000 INJECTION, SOLUTION INTRAVENOUS; SUBCUTANEOUS at 16:17

## 2017-03-02 RX ADMIN — ACETAMINOPHEN 975 MG: 325 TABLET, FILM COATED ORAL at 14:07

## 2017-03-02 RX ADMIN — DIAZEPAM 2 MG: 2 TABLET ORAL at 23:51

## 2017-03-02 RX ADMIN — GABAPENTIN 300 MG: 300 CAPSULE ORAL at 22:06

## 2017-03-02 RX ADMIN — ACETAMINOPHEN 975 MG: 325 TABLET, FILM COATED ORAL at 05:06

## 2017-03-02 RX ADMIN — OXYCODONE HYDROCHLORIDE 10 MG: 10 TABLET ORAL at 20:07

## 2017-03-02 RX ADMIN — DOCUSATE SODIUM 100 MG: 100 CAPSULE, LIQUID FILLED ORAL at 08:32

## 2017-03-03 VITALS
HEART RATE: 92 BPM | BODY MASS INDEX: 36.1 KG/M2 | HEIGHT: 67 IN | TEMPERATURE: 98.6 F | SYSTOLIC BLOOD PRESSURE: 125 MMHG | RESPIRATION RATE: 20 BRPM | WEIGHT: 230 LBS | DIASTOLIC BLOOD PRESSURE: 83 MMHG | OXYGEN SATURATION: 95 %

## 2017-03-03 LAB
BASOPHILS # BLD AUTO: 0.01 THOUSANDS/ΜL (ref 0–0.1)
BASOPHILS NFR BLD AUTO: 0 % (ref 0–1)
EOSINOPHIL # BLD AUTO: 0 THOUSAND/ΜL (ref 0–0.61)
EOSINOPHIL NFR BLD AUTO: 0 % (ref 0–6)
ERYTHROCYTE [DISTWIDTH] IN BLOOD BY AUTOMATED COUNT: 13.2 % (ref 11.6–15.1)
HCT VFR BLD AUTO: 35.3 % (ref 34.8–46.1)
HGB BLD-MCNC: 11.6 G/DL (ref 11.5–15.4)
LYMPHOCYTES # BLD AUTO: 2.02 THOUSANDS/ΜL (ref 0.6–4.47)
LYMPHOCYTES NFR BLD AUTO: 9 % (ref 14–44)
MCH RBC QN AUTO: 27.9 PG (ref 26.8–34.3)
MCHC RBC AUTO-ENTMCNC: 32.9 G/DL (ref 31.4–37.4)
MCV RBC AUTO: 85 FL (ref 82–98)
MONOCYTES # BLD AUTO: 1.01 THOUSAND/ΜL (ref 0.17–1.22)
MONOCYTES NFR BLD AUTO: 4 % (ref 4–12)
NEUTROPHILS # BLD AUTO: 20.51 THOUSANDS/ΜL (ref 1.85–7.62)
NEUTS SEG NFR BLD AUTO: 87 % (ref 43–75)
NRBC BLD AUTO-RTO: 0 /100 WBCS
PLATELET # BLD AUTO: 325 THOUSANDS/UL (ref 149–390)
PMV BLD AUTO: 11.8 FL (ref 8.9–12.7)
RBC # BLD AUTO: 4.16 MILLION/UL (ref 3.81–5.12)
WBC # BLD AUTO: 23.65 THOUSAND/UL (ref 4.31–10.16)

## 2017-03-03 PROCEDURE — 85025 COMPLETE CBC W/AUTO DIFF WBC: CPT | Performed by: PHYSICIAN ASSISTANT

## 2017-03-03 RX ORDER — DIAZEPAM 5 MG/1
5 TABLET ORAL EVERY 6 HOURS PRN
Qty: 60 TABLET | Refills: 0 | Status: SHIPPED | OUTPATIENT
Start: 2017-03-03 | End: 2018-03-10 | Stop reason: ALTCHOICE

## 2017-03-03 RX ORDER — CALCIUM CARBONATE 200(500)MG
500 TABLET,CHEWABLE ORAL DAILY PRN
Refills: 0
Start: 2017-03-03 | End: 2017-04-02

## 2017-03-03 RX ORDER — CALCIUM CARBONATE 200(500)MG
500 TABLET,CHEWABLE ORAL DAILY PRN
Status: DISCONTINUED | OUTPATIENT
Start: 2017-03-03 | End: 2017-03-03 | Stop reason: HOSPADM

## 2017-03-03 RX ORDER — BUTALBITAL, ACETAMINOPHEN AND CAFFEINE 50; 325; 40 MG/1; MG/1; MG/1
1 TABLET ORAL EVERY 4 HOURS PRN
Qty: 60 TABLET | Refills: 0 | Status: SHIPPED | OUTPATIENT
Start: 2017-03-03 | End: 2017-06-02 | Stop reason: ALTCHOICE

## 2017-03-03 RX ORDER — ONDANSETRON 4 MG/1
4 TABLET, ORALLY DISINTEGRATING ORAL EVERY 6 HOURS PRN
Qty: 20 TABLET | Refills: 0 | Status: SHIPPED | OUTPATIENT
Start: 2017-03-03 | End: 2017-06-02 | Stop reason: ALTCHOICE

## 2017-03-03 RX ORDER — BUTALBITAL, ACETAMINOPHEN AND CAFFEINE 50; 325; 40 MG/1; MG/1; MG/1
1 TABLET ORAL
Status: DISCONTINUED | OUTPATIENT
Start: 2017-03-03 | End: 2017-03-03 | Stop reason: HOSPADM

## 2017-03-03 RX ORDER — DIAZEPAM 5 MG/1
5 TABLET ORAL 4 TIMES DAILY
Status: DISCONTINUED | OUTPATIENT
Start: 2017-03-03 | End: 2017-03-03 | Stop reason: HOSPADM

## 2017-03-03 RX ORDER — DEXAMETHASONE 2 MG/1
TABLET ORAL
Qty: 30 TABLET | Refills: 0 | Status: SHIPPED | OUTPATIENT
Start: 2017-03-03 | End: 2017-06-02 | Stop reason: ALTCHOICE

## 2017-03-03 RX ORDER — OXYCODONE HYDROCHLORIDE 10 MG/1
10 TABLET ORAL EVERY 4 HOURS PRN
Qty: 45 TABLET | Refills: 0 | Status: SHIPPED | OUTPATIENT
Start: 2017-03-03 | End: 2018-03-10 | Stop reason: ALTCHOICE

## 2017-03-03 RX ADMIN — GABAPENTIN 300 MG: 300 CAPSULE ORAL at 10:29

## 2017-03-03 RX ADMIN — DEXAMETHASONE 4 MG: 4 TABLET ORAL at 13:16

## 2017-03-03 RX ADMIN — DOCUSATE SODIUM 100 MG: 100 CAPSULE, LIQUID FILLED ORAL at 17:42

## 2017-03-03 RX ADMIN — ACETAMINOPHEN 975 MG: 325 TABLET, FILM COATED ORAL at 05:47

## 2017-03-03 RX ADMIN — DIAZEPAM 2 MG: 2 TABLET ORAL at 10:31

## 2017-03-03 RX ADMIN — OXYCODONE HYDROCHLORIDE 15 MG: 5 TABLET ORAL at 10:31

## 2017-03-03 RX ADMIN — DOCUSATE SODIUM 100 MG: 100 CAPSULE, LIQUID FILLED ORAL at 10:31

## 2017-03-03 RX ADMIN — HEPARIN SODIUM 5000 UNITS: 5000 INJECTION, SOLUTION INTRAVENOUS; SUBCUTANEOUS at 13:21

## 2017-03-03 RX ADMIN — HEPARIN SODIUM 5000 UNITS: 5000 INJECTION, SOLUTION INTRAVENOUS; SUBCUTANEOUS at 05:47

## 2017-03-03 RX ADMIN — DEXAMETHASONE 4 MG: 4 TABLET ORAL at 05:51

## 2017-03-03 RX ADMIN — BUTALBITAL, ACETAMINOPHEN, AND CAFFEINE 1 TABLET: 50; 325; 40 TABLET ORAL at 17:42

## 2017-03-03 RX ADMIN — Medication 500 MG: at 11:37

## 2017-03-03 RX ADMIN — BUTALBITAL, ACETAMINOPHEN, AND CAFFEINE 1 TABLET: 50; 325; 40 TABLET ORAL at 13:16

## 2017-03-03 RX ADMIN — GABAPENTIN 300 MG: 300 CAPSULE ORAL at 16:15

## 2017-03-03 RX ADMIN — DIAZEPAM 5 MG: 5 TABLET ORAL at 13:16

## 2017-03-04 LAB
ABO GROUP BLD BPU: NORMAL
ABO GROUP BLD BPU: NORMAL
BPU ID: NORMAL
BPU ID: NORMAL
CROSSMATCH: NORMAL
CROSSMATCH: NORMAL
UNIT DISPENSE STATUS: NORMAL
UNIT DISPENSE STATUS: NORMAL
UNIT PRODUCT CODE: NORMAL
UNIT PRODUCT CODE: NORMAL
UNIT RH: NORMAL
UNIT RH: NORMAL

## 2017-03-07 ENCOUNTER — GENERIC CONVERSION - ENCOUNTER (OUTPATIENT)
Dept: OTHER | Facility: OTHER | Age: 32
End: 2017-03-07

## 2017-03-09 ENCOUNTER — GENERIC CONVERSION - ENCOUNTER (OUTPATIENT)
Dept: OTHER | Facility: OTHER | Age: 32
End: 2017-03-09

## 2017-03-17 ENCOUNTER — ALLSCRIPTS OFFICE VISIT (OUTPATIENT)
Dept: OTHER | Facility: OTHER | Age: 32
End: 2017-03-17

## 2017-03-29 ENCOUNTER — GENERIC CONVERSION - ENCOUNTER (OUTPATIENT)
Dept: OTHER | Facility: OTHER | Age: 32
End: 2017-03-29

## 2017-04-05 ENCOUNTER — APPOINTMENT (OUTPATIENT)
Dept: PHYSICAL THERAPY | Facility: REHABILITATION | Age: 32
End: 2017-04-05
Payer: COMMERCIAL

## 2017-04-05 ENCOUNTER — GENERIC CONVERSION - ENCOUNTER (OUTPATIENT)
Dept: OTHER | Facility: OTHER | Age: 32
End: 2017-04-05

## 2017-04-05 PROCEDURE — 97162 PT EVAL MOD COMPLEX 30 MIN: CPT

## 2017-04-05 PROCEDURE — 97112 NEUROMUSCULAR REEDUCATION: CPT

## 2017-04-06 ENCOUNTER — GENERIC CONVERSION - ENCOUNTER (OUTPATIENT)
Dept: OTHER | Facility: OTHER | Age: 32
End: 2017-04-06

## 2017-04-12 ENCOUNTER — ALLSCRIPTS OFFICE VISIT (OUTPATIENT)
Dept: FAMILY MEDICINE CLINIC | Facility: CLINIC | Age: 32
End: 2017-04-12
Payer: COMMERCIAL

## 2017-04-12 PROCEDURE — T1015 CLINIC SERVICE: HCPCS | Performed by: NURSE PRACTITIONER

## 2017-04-19 ENCOUNTER — APPOINTMENT (OUTPATIENT)
Dept: PHYSICAL THERAPY | Facility: HOME HEALTHCARE | Age: 32
End: 2017-04-19
Payer: COMMERCIAL

## 2017-04-19 ENCOUNTER — GENERIC CONVERSION - ENCOUNTER (OUTPATIENT)
Dept: OTHER | Facility: OTHER | Age: 32
End: 2017-04-19

## 2017-04-19 PROCEDURE — 97162 PT EVAL MOD COMPLEX 30 MIN: CPT

## 2017-04-25 ENCOUNTER — GENERIC CONVERSION - ENCOUNTER (OUTPATIENT)
Dept: OTHER | Facility: OTHER | Age: 32
End: 2017-04-25

## 2017-04-25 ENCOUNTER — TRANSCRIBE ORDERS (OUTPATIENT)
Dept: ADMINISTRATIVE | Facility: HOSPITAL | Age: 32
End: 2017-04-25

## 2017-04-25 ENCOUNTER — APPOINTMENT (OUTPATIENT)
Dept: PHYSICAL THERAPY | Facility: HOME HEALTHCARE | Age: 32
End: 2017-04-25
Payer: COMMERCIAL

## 2017-04-25 ENCOUNTER — HOSPITAL ENCOUNTER (OUTPATIENT)
Dept: CT IMAGING | Facility: HOSPITAL | Age: 32
Discharge: HOME | End: 2017-04-25
Attending: NEUROLOGICAL SURGERY
Payer: COMMERCIAL

## 2017-04-25 DIAGNOSIS — H93.3X1 DISORDER OF RIGHT ACOUSTIC NERVE: ICD-10-CM

## 2017-04-25 DIAGNOSIS — Z98.2 HISTORY OF BRAIN SHUNT: Primary | ICD-10-CM

## 2017-04-25 DIAGNOSIS — Z98.2 HISTORY OF BRAIN SHUNT: ICD-10-CM

## 2017-04-25 DIAGNOSIS — D33.3 BENIGN NEOPLASM OF CRANIAL NERVE (HCC): ICD-10-CM

## 2017-04-25 PROCEDURE — 97110 THERAPEUTIC EXERCISES: CPT

## 2017-04-25 PROCEDURE — 70450 CT HEAD/BRAIN W/O DYE: CPT

## 2017-04-25 PROCEDURE — 97112 NEUROMUSCULAR REEDUCATION: CPT

## 2017-04-27 ENCOUNTER — ALLSCRIPTS OFFICE VISIT (OUTPATIENT)
Dept: OTHER | Facility: OTHER | Age: 32
End: 2017-04-27

## 2017-04-27 ENCOUNTER — APPOINTMENT (OUTPATIENT)
Dept: PHYSICAL THERAPY | Facility: HOME HEALTHCARE | Age: 32
End: 2017-04-27
Payer: COMMERCIAL

## 2017-04-27 ENCOUNTER — APPOINTMENT (OUTPATIENT)
Dept: OCCUPATIONAL THERAPY | Facility: HOME HEALTHCARE | Age: 32
End: 2017-04-27
Payer: COMMERCIAL

## 2017-04-27 PROCEDURE — 97110 THERAPEUTIC EXERCISES: CPT

## 2017-04-27 PROCEDURE — 97166 OT EVAL MOD COMPLEX 45 MIN: CPT

## 2017-04-27 PROCEDURE — 97535 SELF CARE MNGMENT TRAINING: CPT

## 2017-04-27 PROCEDURE — 97112 NEUROMUSCULAR REEDUCATION: CPT

## 2017-04-28 ENCOUNTER — GENERIC CONVERSION - ENCOUNTER (OUTPATIENT)
Dept: OTHER | Facility: OTHER | Age: 32
End: 2017-04-28

## 2017-04-28 ENCOUNTER — APPOINTMENT (OUTPATIENT)
Dept: PHYSICAL THERAPY | Facility: HOME HEALTHCARE | Age: 32
End: 2017-04-28
Payer: COMMERCIAL

## 2017-05-17 ENCOUNTER — TRANSCRIBE ORDERS (OUTPATIENT)
Dept: ADMINISTRATIVE | Facility: HOSPITAL | Age: 32
End: 2017-05-17

## 2017-05-17 DIAGNOSIS — G93.0 CEREBRAL CYSTS: Primary | ICD-10-CM

## 2017-06-01 ENCOUNTER — GENERIC CONVERSION - ENCOUNTER (OUTPATIENT)
Dept: OTHER | Facility: OTHER | Age: 32
End: 2017-06-01

## 2017-06-02 ENCOUNTER — APPOINTMENT (EMERGENCY)
Dept: CT IMAGING | Facility: HOSPITAL | Age: 32
End: 2017-06-02
Payer: COMMERCIAL

## 2017-06-02 ENCOUNTER — HOSPITAL ENCOUNTER (EMERGENCY)
Facility: HOSPITAL | Age: 32
Discharge: LEFT BEFORE DISCHARGE | End: 2017-06-02
Attending: EMERGENCY MEDICINE
Payer: COMMERCIAL

## 2017-06-02 ENCOUNTER — APPOINTMENT (EMERGENCY)
Dept: RADIOLOGY | Facility: HOSPITAL | Age: 32
End: 2017-06-02
Payer: COMMERCIAL

## 2017-06-02 VITALS
RESPIRATION RATE: 19 BRPM | BODY MASS INDEX: 36.57 KG/M2 | TEMPERATURE: 98.8 F | OXYGEN SATURATION: 98 % | WEIGHT: 233 LBS | HEART RATE: 98 BPM | DIASTOLIC BLOOD PRESSURE: 60 MMHG | SYSTOLIC BLOOD PRESSURE: 115 MMHG | HEIGHT: 67 IN

## 2017-06-02 DIAGNOSIS — G93.0 INTRACRANIAL ARACHNOID CYST: ICD-10-CM

## 2017-06-02 DIAGNOSIS — R51.9 ACUTE HEADACHE: Primary | ICD-10-CM

## 2017-06-02 LAB
ANION GAP SERPL CALCULATED.3IONS-SCNC: 11 MMOL/L (ref 4–13)
APTT PPP: 33 SECONDS (ref 23–35)
BACTERIA UR QL AUTO: ABNORMAL /HPF
BASOPHILS # BLD AUTO: 0.05 THOUSANDS/ΜL (ref 0–0.1)
BASOPHILS NFR BLD AUTO: 0 % (ref 0–1)
BILIRUB UR QL STRIP: NEGATIVE
BUN SERPL-MCNC: 6 MG/DL (ref 5–25)
CALCIUM SERPL-MCNC: 8.8 MG/DL (ref 8.3–10.1)
CHLORIDE SERPL-SCNC: 101 MMOL/L (ref 100–108)
CLARITY UR: ABNORMAL
CO2 SERPL-SCNC: 25 MMOL/L (ref 21–32)
COLOR UR: YELLOW
CREAT SERPL-MCNC: 0.71 MG/DL (ref 0.6–1.3)
EOSINOPHIL # BLD AUTO: 0.15 THOUSAND/ΜL (ref 0–0.61)
EOSINOPHIL NFR BLD AUTO: 1 % (ref 0–6)
ERYTHROCYTE [DISTWIDTH] IN BLOOD BY AUTOMATED COUNT: 13.2 % (ref 11.6–15.1)
GFR SERPL CREATININE-BSD FRML MDRD: >60 ML/MIN/1.73SQ M
GLUCOSE SERPL-MCNC: 151 MG/DL (ref 65–140)
GLUCOSE UR STRIP-MCNC: NEGATIVE MG/DL
HCT VFR BLD AUTO: 39.6 % (ref 34.8–46.1)
HGB BLD-MCNC: 13.1 G/DL (ref 11.5–15.4)
HGB UR QL STRIP.AUTO: NEGATIVE
INR PPP: 1 (ref 0.86–1.16)
KETONES UR STRIP-MCNC: NEGATIVE MG/DL
LEUKOCYTE ESTERASE UR QL STRIP: ABNORMAL
LYMPHOCYTES # BLD AUTO: 4.45 THOUSANDS/ΜL (ref 0.6–4.47)
LYMPHOCYTES NFR BLD AUTO: 30 % (ref 14–44)
MCH RBC QN AUTO: 27.7 PG (ref 26.8–34.3)
MCHC RBC AUTO-ENTMCNC: 33.1 G/DL (ref 31.4–37.4)
MCV RBC AUTO: 84 FL (ref 82–98)
MONOCYTES # BLD AUTO: 0.99 THOUSAND/ΜL (ref 0.17–1.22)
MONOCYTES NFR BLD AUTO: 7 % (ref 4–12)
NEUTROPHILS # BLD AUTO: 9.35 THOUSANDS/ΜL (ref 1.85–7.62)
NEUTS SEG NFR BLD AUTO: 62 % (ref 43–75)
NITRITE UR QL STRIP: NEGATIVE
NON-SQ EPI CELLS URNS QL MICRO: ABNORMAL /HPF
PH UR STRIP.AUTO: 6.5 [PH] (ref 4.5–8)
PLATELET # BLD AUTO: 280 THOUSANDS/UL (ref 149–390)
PMV BLD AUTO: 11.8 FL (ref 8.9–12.7)
POTASSIUM SERPL-SCNC: 3.6 MMOL/L (ref 3.5–5.3)
PROT UR STRIP-MCNC: NEGATIVE MG/DL
PROTHROMBIN TIME: 13.1 SECONDS (ref 12.1–14.4)
RBC # BLD AUTO: 4.73 MILLION/UL (ref 3.81–5.12)
RBC #/AREA URNS AUTO: ABNORMAL /HPF
SODIUM SERPL-SCNC: 137 MMOL/L (ref 136–145)
SP GR UR STRIP.AUTO: 1.01 (ref 1–1.03)
UROBILINOGEN UR QL STRIP.AUTO: 0.2 E.U./DL
WBC # BLD AUTO: 14.99 THOUSAND/UL (ref 4.31–10.16)
WBC #/AREA URNS AUTO: ABNORMAL /HPF

## 2017-06-02 PROCEDURE — 99284 EMERGENCY DEPT VISIT MOD MDM: CPT

## 2017-06-02 PROCEDURE — 80048 BASIC METABOLIC PNL TOTAL CA: CPT | Performed by: PHYSICIAN ASSISTANT

## 2017-06-02 PROCEDURE — 85730 THROMBOPLASTIN TIME PARTIAL: CPT | Performed by: PHYSICIAN ASSISTANT

## 2017-06-02 PROCEDURE — 96374 THER/PROPH/DIAG INJ IV PUSH: CPT

## 2017-06-02 PROCEDURE — 70250 X-RAY EXAM OF SKULL: CPT

## 2017-06-02 PROCEDURE — 71020 HB CHEST X-RAY 2VW FRONTAL&LATL: CPT

## 2017-06-02 PROCEDURE — 36415 COLL VENOUS BLD VENIPUNCTURE: CPT | Performed by: PHYSICIAN ASSISTANT

## 2017-06-02 PROCEDURE — 85025 COMPLETE CBC W/AUTO DIFF WBC: CPT | Performed by: PHYSICIAN ASSISTANT

## 2017-06-02 PROCEDURE — 81001 URINALYSIS AUTO W/SCOPE: CPT | Performed by: PHYSICIAN ASSISTANT

## 2017-06-02 PROCEDURE — 85610 PROTHROMBIN TIME: CPT | Performed by: PHYSICIAN ASSISTANT

## 2017-06-02 PROCEDURE — 74000 HB X-RAY EXAM OF ABDOMEN (SINGLE ANTEROPOSTERIOR VIEW): CPT

## 2017-06-02 PROCEDURE — 70450 CT HEAD/BRAIN W/O DYE: CPT

## 2017-06-02 RX ORDER — OXYCODONE HYDROCHLORIDE AND ACETAMINOPHEN 5; 325 MG/1; MG/1
1 TABLET ORAL ONCE
Status: COMPLETED | OUTPATIENT
Start: 2017-06-02 | End: 2017-06-02

## 2017-06-02 RX ORDER — METOCLOPRAMIDE HYDROCHLORIDE 5 MG/ML
10 INJECTION INTRAMUSCULAR; INTRAVENOUS ONCE
Status: COMPLETED | OUTPATIENT
Start: 2017-06-02 | End: 2017-06-02

## 2017-06-02 RX ADMIN — OXYCODONE HYDROCHLORIDE AND ACETAMINOPHEN 1 TABLET: 5; 325 TABLET ORAL at 19:15

## 2017-06-02 RX ADMIN — METOCLOPRAMIDE 10 MG: 5 INJECTION, SOLUTION INTRAMUSCULAR; INTRAVENOUS at 19:15

## 2017-06-14 ENCOUNTER — ALLSCRIPTS OFFICE VISIT (OUTPATIENT)
Dept: FAMILY MEDICINE CLINIC | Facility: CLINIC | Age: 32
End: 2017-06-14
Payer: COMMERCIAL

## 2017-07-31 ENCOUNTER — ALLSCRIPTS OFFICE VISIT (OUTPATIENT)
Dept: FAMILY MEDICINE CLINIC | Facility: CLINIC | Age: 32
End: 2017-07-31
Payer: COMMERCIAL

## 2017-07-31 PROCEDURE — T1015 CLINIC SERVICE: HCPCS | Performed by: FAMILY MEDICINE

## 2017-09-19 DIAGNOSIS — R53.83 OTHER FATIGUE: ICD-10-CM

## 2017-09-25 ENCOUNTER — APPOINTMENT (OUTPATIENT)
Dept: LAB | Facility: HOSPITAL | Age: 32
End: 2017-09-25
Payer: COMMERCIAL

## 2017-09-25 DIAGNOSIS — R53.83 OTHER FATIGUE: ICD-10-CM

## 2017-09-25 LAB
ALBUMIN SERPL BCP-MCNC: 3.6 G/DL (ref 3.5–5)
ALP SERPL-CCNC: 90 U/L (ref 46–116)
ALT SERPL W P-5'-P-CCNC: 23 U/L (ref 12–78)
ANION GAP SERPL CALCULATED.3IONS-SCNC: 8 MMOL/L (ref 4–13)
AST SERPL W P-5'-P-CCNC: 12 U/L (ref 5–45)
BILIRUB SERPL-MCNC: 0.58 MG/DL (ref 0.2–1)
BUN SERPL-MCNC: 7 MG/DL (ref 5–25)
CALCIUM SERPL-MCNC: 9.1 MG/DL (ref 8.3–10.1)
CHLORIDE SERPL-SCNC: 104 MMOL/L (ref 100–108)
CO2 SERPL-SCNC: 27 MMOL/L (ref 21–32)
CREAT SERPL-MCNC: 0.57 MG/DL (ref 0.6–1.3)
ERYTHROCYTE [DISTWIDTH] IN BLOOD BY AUTOMATED COUNT: 13.9 % (ref 11.6–15.1)
GFR SERPL CREATININE-BSD FRML MDRD: 121 ML/MIN/1.73SQ M
GLUCOSE P FAST SERPL-MCNC: 113 MG/DL (ref 65–99)
HCT VFR BLD AUTO: 41.5 % (ref 34.8–46.1)
HGB BLD-MCNC: 12.9 G/DL (ref 11.5–15.4)
IRON SERPL-MCNC: 64 UG/DL (ref 50–170)
MCH RBC QN AUTO: 28.1 PG (ref 26.8–34.3)
MCHC RBC AUTO-ENTMCNC: 31.1 G/DL (ref 31.4–37.4)
MCV RBC AUTO: 90 FL (ref 82–98)
PLATELET # BLD AUTO: 266 THOUSANDS/UL (ref 149–390)
PMV BLD AUTO: 12.1 FL (ref 8.9–12.7)
POTASSIUM SERPL-SCNC: 4.4 MMOL/L (ref 3.5–5.3)
PROT SERPL-MCNC: 7 G/DL (ref 6.4–8.2)
RBC # BLD AUTO: 4.59 MILLION/UL (ref 3.81–5.12)
SODIUM SERPL-SCNC: 139 MMOL/L (ref 136–145)
TSH SERPL DL<=0.05 MIU/L-ACNC: 1.19 UIU/ML (ref 0.36–3.74)
WBC # BLD AUTO: 10.59 THOUSAND/UL (ref 4.31–10.16)

## 2017-09-25 PROCEDURE — 80053 COMPREHEN METABOLIC PANEL: CPT

## 2017-09-25 PROCEDURE — 84443 ASSAY THYROID STIM HORMONE: CPT

## 2017-09-25 PROCEDURE — 83540 ASSAY OF IRON: CPT

## 2017-09-25 PROCEDURE — 36415 COLL VENOUS BLD VENIPUNCTURE: CPT

## 2017-09-25 PROCEDURE — 85027 COMPLETE CBC AUTOMATED: CPT

## 2017-09-28 ENCOUNTER — GENERIC CONVERSION - ENCOUNTER (OUTPATIENT)
Dept: OTHER | Facility: OTHER | Age: 32
End: 2017-09-28

## 2017-10-27 ENCOUNTER — APPOINTMENT (OUTPATIENT)
Dept: FAMILY MEDICINE CLINIC | Facility: CLINIC | Age: 32
End: 2017-10-27
Payer: COMMERCIAL

## 2017-10-27 ENCOUNTER — GENERIC CONVERSION - ENCOUNTER (OUTPATIENT)
Dept: OTHER | Facility: OTHER | Age: 32
End: 2017-10-27

## 2017-10-27 DIAGNOSIS — G93.0 CEREBRAL CYSTS: ICD-10-CM

## 2017-10-27 DIAGNOSIS — R39.9 UNSPECIFIED SYMPTOMS AND SIGNS INVOLVING THE GENITOURINARY SYSTEM: ICD-10-CM

## 2017-10-27 PROCEDURE — T1015 CLINIC SERVICE: HCPCS | Performed by: FAMILY MEDICINE

## 2017-10-30 ENCOUNTER — HOSPITAL ENCOUNTER (OUTPATIENT)
Dept: CT IMAGING | Facility: HOSPITAL | Age: 32
Discharge: HOME | End: 2017-10-30
Payer: COMMERCIAL

## 2017-10-30 DIAGNOSIS — G93.0 CEREBRAL CYSTS: ICD-10-CM

## 2017-10-30 PROCEDURE — 70450 CT HEAD/BRAIN W/O DYE: CPT

## 2017-11-21 ENCOUNTER — APPOINTMENT (OUTPATIENT)
Dept: LAB | Facility: HOSPITAL | Age: 32
End: 2017-11-21
Payer: COMMERCIAL

## 2017-11-21 ENCOUNTER — ALLSCRIPTS OFFICE VISIT (OUTPATIENT)
Dept: FAMILY MEDICINE CLINIC | Facility: CLINIC | Age: 32
End: 2017-11-21
Payer: COMMERCIAL

## 2017-11-21 DIAGNOSIS — R39.9 UNSPECIFIED SYMPTOMS AND SIGNS INVOLVING THE GENITOURINARY SYSTEM: ICD-10-CM

## 2017-11-21 LAB
BACTERIA UR QL AUTO: ABNORMAL /HPF
BILIRUB UR QL STRIP: NEGATIVE
BILIRUB UR QL STRIP: NEGATIVE
CLARITY UR: ABNORMAL
CLARITY UR: NORMAL
COLOR UR: YELLOW
COLOR UR: YELLOW
GLUCOSE (HISTORICAL): NEGATIVE
GLUCOSE UR STRIP-MCNC: NEGATIVE MG/DL
HGB UR QL STRIP.AUTO: ABNORMAL
HGB UR QL STRIP.AUTO: NORMAL
HYALINE CASTS #/AREA URNS LPF: ABNORMAL /LPF
KETONES UR STRIP-MCNC: NEGATIVE MG/DL
KETONES UR STRIP-MCNC: NEGATIVE MG/DL
LEUKOCYTE ESTERASE UR QL STRIP: ABNORMAL
LEUKOCYTE ESTERASE UR QL STRIP: NORMAL
NITRITE UR QL STRIP: NEGATIVE
NITRITE UR QL STRIP: NEGATIVE
NON-SQ EPI CELLS URNS QL MICRO: ABNORMAL /HPF
PH UR STRIP.AUTO: 6 [PH]
PH UR STRIP.AUTO: 6.5 [PH] (ref 4.5–8)
PROT UR STRIP-MCNC: ABNORMAL MG/DL
PROT UR STRIP-MCNC: NORMAL MG/DL
RBC #/AREA URNS AUTO: ABNORMAL /HPF
SP GR UR STRIP.AUTO: 1.01
SP GR UR STRIP.AUTO: 1.01 (ref 1–1.03)
UROBILINOGEN UR QL STRIP.AUTO: 0.2
UROBILINOGEN UR QL STRIP.AUTO: 0.2 E.U./DL
WBC #/AREA URNS AUTO: ABNORMAL /HPF

## 2017-11-21 PROCEDURE — 87086 URINE CULTURE/COLONY COUNT: CPT

## 2017-11-21 PROCEDURE — 81001 URINALYSIS AUTO W/SCOPE: CPT

## 2017-11-21 PROCEDURE — 81002 URINALYSIS NONAUTO W/O SCOPE: CPT | Performed by: FAMILY MEDICINE

## 2017-11-21 PROCEDURE — T1015 CLINIC SERVICE: HCPCS | Performed by: FAMILY MEDICINE

## 2017-11-22 LAB — BACTERIA UR CULT: NORMAL

## 2018-01-10 NOTE — MISCELLANEOUS
Message  Pt reports for the past 2 weeks she has been experiencing h/a L frontal, and L eye pressure  Reviewed pt's notes and surgery with Bertrand Justin and suggested she f/u with her PCP to evaluate  She was agreeable        Signatures   Electronically signed by : Kay Reyes, ; Sep 28 2017 12:05PM EST                       (Author)

## 2018-01-10 NOTE — MISCELLANEOUS
Message   Recorded as Task   Date: 03/28/2017 04:22 PM, Created By: Bob Bowles   Task Name: Call Back   Assigned To: Argelia Dorsey   Regarding Patient: Juan Luis Angel, Status: Active   Comment:    Argelia Dorsey - 28 Mar 2017 4:22 PM     TASK CREATED  Pt reports since her procedure 3/1 her R ear hearing has not improved  She reports it sounds like fluid behind her ear, and when she yawns it sounds like "air" coming from her ear  The incision area is tender to touch and feels swollen, and she feels some R side facial numbness  She questions if these are normal expectations  Reviewed with Eileen Nolan who reports these are all expected symptoms  Will notify pt  Argelia Dorsey - 29 Mar 2017 1:51 PM     TASK EDITED  Pt notified and stated an understanding          Signatures   Electronically signed by : Nkechi Luu, ; Mar 29 2017  1:51PM EST                       (Author)

## 2018-01-11 NOTE — MISCELLANEOUS
Provider Comments  Provider Comments:   pt NO showed for appt on 06/14/2017-AF      Signatures   Electronically signed by :  Kimberly Workman, ; Jun 14 2017  1:30PM EST                       (Author)

## 2018-01-12 NOTE — MISCELLANEOUS
Message  S/w pt on telephone regarding surgery scheduled 3/1/17  Verified allergies and went over pre-op instructions, including bathing and NPO status  Patient states that she has been using the hibiclens and she was mailed the bernice wipes  I reminded her to use them tonight and again tomorrow morning  She denies taking any blood thinning medications  Answered any questions she may have had at this time        Signatures   Electronically signed by : Chuy Cavanaugh RN; Feb 28 2017  1:02PM EST                       (Author)

## 2018-01-12 NOTE — PROGRESS NOTES
Chief Complaint  Patient presents post right image guided retromastoid craniotomy for fenestration and placement of internal cysto-subarachnoid shunt  History of Present Illness  HPI: Patient presents today for 2 week POV for incision check  She denies any incisional issues or fevers other than a dry itching sensation on the incision  She also denies any dizziness, nausea or changes to her vision  She is have a "constant dull headache" as well as occasional "sharp pains" on the right side of her head  Patient is currently taking both fioricet and oxycodone to treat her pain, which according to her, "only takes the edge off"  Latrice Rm is very anxious today, and she states that her hearing still has not come back in her right ear since the surgery  She drove to today's appointment with her son  Active Problems    1  Abdominal migraine, not intractable (346 20) (G43 D0)   2  Abnormal CT of brain (793 0) (R90 89)   3  Arachnoid cyst (348 0) (G93 0)   4  Birth control (V25 9) (Z30 9)   5  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)   6  Degeneration of intervertebral disc of lumbar region (722 52) (M51 36)   7  Degenerative cervical disc (722 4) (M50 30)   8  Degenerative disc disease, lumbar (722 52) (M51 36)   9  Depression (311) (F32 9)   10  High blood pressure (401 9) (I10)   11  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)   12  Meralgia paresthetica, unspecified lower limb (355 1) (G57 10)   13  Preoperative clearance (V72 84) (Z01 818)   14  Right shoulder pain (719 41) (M25 511)   15  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)   16  Sciatica (724 3) (M54 30)   17  Screening for lipid disorders (V77 91) (Z13 220)   18  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) (M47 816)    Current Meds   1  Gabapentin 300 MG Oral Capsule; TAKE 1 CAPSULE  IN THE MORNING AND 2   CAPSULES IN THE EVENING;    Therapy: 20Apr2016 to (Curtis Smiley)  Requested for: 20Apr2016; Last   Rx:20Apr2016 Ordered   2  Valium 10 MG Oral Tablet; Therapy: (Recorded:17Mar2017) to Recorded    Allergies    1  No Known Drug Allergies    Vitals  Signs    Temperature: 96 8 F  Respiration: 18  Systolic: 475  Diastolic: 84  Height: 5 ft 7 in  Weight: 235 lb   BMI Calculated: 36 81  BSA Calculated: 2 17    Procedure    Wound Exam: incision well approximated  Mild erythema surrounding sutures  Scabbing noted  No drainage or edema present  There was mild erythema around the wound edges  Procedure Note:   Complications: Incision ETTA   there were no complications  Plan  Abdominal migraine, not intractable    · Butalbital-APAP-Caffeine -40 MG Oral Capsule (Fioricet); TAKE 1 CAPSULE  Every 4 hours PRN migraine  Arachnoid cyst    · OxyCODONE HCl - 5 MG Oral Tablet; TAKE 1 TABLET Every 4 hours PRN pain    Discussion/Summary    Reviewed incision care with patient including daily observation for s/s infection including: increased erythema, edema, drainage, dehiscence of incision or fever >101  Advised to continue cleansing area with mild soap and water and pat dry, but not to apply any creams, lotions or ointments and not to submerge in any water  She is to maintain lifting restrictions until cleared by the surgeon  Ángel Brito came in today to speak with patient  He advised her that she needs to go back to her pain doc for a refill of Valium  Ed provided patient refill of Oxycodone 5mg 1 tablet Q4 prn #60, and Fioricet -40 1 tablet Q4 prn #30, however, she was instructed that she may take one or the other, but not both at the same time  He also explained that she may drive on her own free will, but that it is not recommended at this point in time until she is cleared by the surgeon  She verbalized understanding  Verified date/time/location of her upcoming POV and provided her with a script for a CT scan that she needs to complete prior  She wants to schedule this on her own   Patient to call the office with any further questions or concerns or if any of the s/s infection that we discussed today would arise  Future Appointments    Date/Time Provider Specialty Site   04/18/2017 01:00 PM BEVERLEY Ortega   Neurosurgery [facility]     Signatures   Electronically signed by : Lainey Smalls RN; Mar 17 2017 11:26AM EST                       (Author)    Electronically signed by : BEVERLEY Caldera ; Mar 17 2017  1:41PM EST                       (Author)

## 2018-01-12 NOTE — MISCELLANEOUS
Message   Recorded as Task   Date: 03/09/2017 09:25 AM, Created By: J Luis Woodard   Task Name: Call Back   Assigned To: Argelia Dorsey   Regarding Patient: Gene Aranda, Status: Active   Comment:    Argelia Dorsey - 09 Mar 2017 9:25 AM     TASK CREATED  Pt reports feeling a tightness at her incision site  No other symptoms  She reports she will have her PCP check incision at her appt  today  She questioned removal of sutures earlier than scheduled appt  Informed her sutures were dissolvable  It was discussed that what she is feeling may be the natural heeling process  She stated an understanding and will call back with any further concerns          Signatures   Electronically signed by : [facility], ; Mar  9 2017  9:26AM EST                       (Author)

## 2018-01-12 NOTE — MISCELLANEOUS
Message   Recorded as Task   Date: 02/29/2016 08:45 AM, Created By: System   Task Name: Schedule Appointment   Assigned To: Rodrigo Lux   Regarding Patient: Adriana Ovalles, Status: In Progress   Comment:    System - 29 Feb 2016 8:45 AM     Preferred Communication: Mail  Ordering Site: [facility]    Referred To: [address]  To Be Done: 29 Feb 2016   Con Baxter - 29 Feb 2016 9:35 AM     TASK IN PROGRESS   Cali,April - 01 Mar 2016 2:46 PM     TASK EDITED  3/1 Intake started in [place]; referred by PCP now; records already within [address] office; dr to review   Cali,April - 10 Mar 2016 3:21 PM     TASK EDITED  Dr Carito Villalobos has oked to schedule but no opiods will be prescribed  recommends pt orthopedics evaluation for shoulder pain  Pt did not want to schedule at this time  Wanted to schedule where her spouse would eventually go  Please refer to another dr Kenneth Flores    1  Birth control (V25 9) (Z30 9)   2  Depression (311) (F32 9)   3  High blood pressure (401 9) (I10)   4  Right shoulder pain (719 41) (M25 511)   5  Sciatica (724 3) (M54 30)   6  Screening for lipid disorders (V77 91) (Z13 220)    Current Meds   1  CeleXA 40 MG Oral Tablet; Take 2 tablets once a day; Therapy: (Recorded:42Wkd0875) to Recorded   2  Citalopram Hydrobromide 40 MG Oral Tablet; TAKE 1 TABLET DAILY; Therapy: 14GLV5276 to (Evaluate:20Zeg8303)  Requested for: 49ZTW5693; Last   Rx:26Rsp1995 Ordered   3  Hydrochlorothiazide 25 MG Oral Tablet; Therapy: (Recorded:17Ezk3352) to Recorded   4  Xanax 0 5 MG Oral Tablet; take 1 tablet 4 times a day; Therapy: (Recorded:93Nib6524) to Recorded    Allergies    1  No Known Drug Allergies    Signatures   Electronically signed by :  [facility] TIERRA THORNE; Mar 26 2016  3:42PM EST                       (Author)

## 2018-01-12 NOTE — MISCELLANEOUS
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)   7  Degeneration of intervertebral disc of lumbar region (722 52) (M51 36)   8  Degenerative cervical disc (722 4) (M50 30)   9  Degenerative disc disease, lumbar (722 52) (M51 36)   10  Depression (311) (F32 9)   11  High blood pressure (401 9) (I10)   12  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)   13  Meralgia paresthetica, unspecified lower limb (355 1) (G57 10)   14  Preoperative clearance (V72 84) (Z01 818)   15  Right shoulder pain (719 41) (M25 511)   16  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)   17  Sciatica (724 3) (M54 30)   18  Screening for lipid disorders (V77 91) (Z13 220)   19  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) (M47 816)    Current Meds   1  Amoxicillin 500 MG Oral Capsule; TAKE 1 CAPSULE 3 times daily; Therapy: 12Apr2017 to (Evaluate:26Apr2017)  Requested for: 12Apr2017; Last   Rx:12Apr2017 Ordered   2  Butalbital-APAP-Caffeine -40 MG Oral Capsule (Fioricet); TAKE 1 CAPSULE   Every 4 hours PRN migraine; Therapy: 71UKC8065 to (Evaluate:22Mar2017); Last Rx:17Mar2017 Ordered   3  Gabapentin 300 MG Oral Capsule (Neurontin); TAKE 1 CAPSULE  IN THE MORNING   AND 2 CAPSULES IN THE EVENING; Therapy: 20Apr2016 to ([id number])  Requested for: 20Apr2016; Last   Rx:20Apr2016 Ordered   4   OxyCODONE HCl - 5 MG Oral Tablet; TAKE 1 TABLET Every 4 hours PRN pain; Therapy: 94NQE7738 to (Evaluate:27Mar2017); Last Rx:17Mar2017 Ordered   5  Valium 10 MG Oral Tablet (DiazePAM); Therapy: (Recorded:17Mar2017) to Recorded    Allergies    1  No Known Drug Allergies    Plan  Right-sided cerebellopontine angle syndrome    · *1 - [facility]  *  Status: Active  Requested for: 25Apr2017  Care Summary provided  : Yes    Signatures   Electronically signed by : TIERRA Mcpherson;  Apr 25 2017 10:34AM EST                       (Author)

## 2018-01-12 NOTE — MISCELLANEOUS
Message  0/28/16 1120 spoke with MERLY Valladares, RE: Barbara Mancini, MRI reviewed by Dr Jennie Merida, instruct pt  to observe for Brain stem compression symptoms, gait disturbance, cranial nerve sx, bladder sx, change in LOC  Appt is planned in next 2 - 3 weeks, pt  will receive a call        Signatures   Electronically signed by : Kevon Santos, [facility]; Jul 29 2016 12:39PM EST                       (Author)

## 2018-01-14 VITALS
BODY MASS INDEX: 37.35 KG/M2 | DIASTOLIC BLOOD PRESSURE: 80 MMHG | RESPIRATION RATE: 18 BRPM | HEIGHT: 67 IN | OXYGEN SATURATION: 99 % | WEIGHT: 238 LBS | HEART RATE: 101 BPM | TEMPERATURE: 97.8 F | SYSTOLIC BLOOD PRESSURE: 122 MMHG

## 2018-01-14 VITALS
BODY MASS INDEX: 36.88 KG/M2 | DIASTOLIC BLOOD PRESSURE: 84 MMHG | SYSTOLIC BLOOD PRESSURE: 128 MMHG | RESPIRATION RATE: 18 BRPM | HEIGHT: 67 IN | WEIGHT: 235 LBS | TEMPERATURE: 96.8 F

## 2018-01-14 VITALS
RESPIRATION RATE: 20 BRPM | OXYGEN SATURATION: 99 % | DIASTOLIC BLOOD PRESSURE: 84 MMHG | WEIGHT: 232 LBS | HEIGHT: 67 IN | TEMPERATURE: 95.4 F | BODY MASS INDEX: 36.41 KG/M2 | HEART RATE: 94 BPM | SYSTOLIC BLOOD PRESSURE: 130 MMHG

## 2018-01-14 NOTE — MISCELLANEOUS
Message      To Whom It May Concern,    Annette Nelson was seen in our office on August 3, 2016 and may return to work on August 5 without restrictions      Thank you,    [facility]        Signatures   Electronically signed by : Patsy Mercedes, ; Aug  4 2016 11:35AM EST                       (Author)

## 2018-01-15 VITALS
BODY MASS INDEX: 36.1 KG/M2 | RESPIRATION RATE: 16 BRPM | SYSTOLIC BLOOD PRESSURE: 128 MMHG | OXYGEN SATURATION: 98 % | DIASTOLIC BLOOD PRESSURE: 90 MMHG | HEIGHT: 67 IN | HEART RATE: 107 BPM | WEIGHT: 230 LBS | TEMPERATURE: 97.5 F

## 2018-01-15 NOTE — MISCELLANEOUS
Message   Recorded as Task   Date: 03/07/2017 01:32 PM, Created By: Maryjane Grubbs   Task Name: Care Coordination   Assigned To: Maryjane Grubbs   Regarding Patient: Naty Melendez, Status: Active   Comment:    Argelia Dorsey - 07 Mar 2017 1:32 PM     TASK CREATED  Pt has been having difficulty filling script for oxycodone provided by this office at hosp d/c secondary to having an ongoing script for same medication different schedule from her pain specialist   Jamie Sims with Dr Ming Almaguer office, and they do not want to take over this medication post-op but did call the pharmacy giving permission from their standpoint to fill  Pt made aware that it will be ready for p/u          Signatures   Electronically signed by : Cindy Esquivel, ; Mar  7 2017  1:32PM EST                       (Author)

## 2018-01-17 NOTE — PROGRESS NOTES
Assessment    1  Arachnoid cyst (348 0) (G93 0)    Plan  Arachnoid cyst    · * CT HEAD WO CONTRAST; Status:Need Information - Financial Authorization; Requested for:27Apr2017;    Perform:[facility]; RTK:[id number]; Ordered; For:Arachnoid cyst; Ordered By:Samuel Garcia;   · Follow-up visit in 1 year Evaluation and Treatment  Follow-up  Status: Hold For -  Scheduling  Requested for: 27Apr2017   Ordered; For: Arachnoid cyst; Ordered By: Skyla Plasencia Performed:  Due: 56JZT4214  Right-sided cerebellopontine angle syndrome    · *1 - [facility]  *  Status: Active  Requested for: 19Apr2017   Ordered; For: Right-sided cerebellopontine angle syndrome; Ordered By: Mike Colindres Performed:  Due: 97AMF5009; Last Updated By: Daryl Rubio; 4/25/2017 10:41:47 AM  Care Summary provided  : Yes    Discussion/Summary    66-year-old female status post a cystoscopy subarachnoid shunt with imaging study showing improved size of the arachnoid cyst    She is neurologically intact    Recommended that she return to full activities    Follow-up study in one years time is important to ensure that the shunt stays open  Chief Complaint  Patient presents for 6 weeks post-op no new studies s/p right image guided retromastoid craniotomy for fenestration and placement of internal cysto-subarachnoid shunt by Dr Kaylyn Myles on 3/1/17  Post-Op  Post-Op Crani-Brain:   Ifrah Julien is status post  3/1/17 (DKO) RIGHT IMAGE GUIDED RETROMASTOID CRANIOTOMY FOR FENESTRATION AND PLACEMENT OF INTERNAL CYSTO-SUBARACHNOID SHUNT (Right)  History of Present Illness: This is a 49-year-old female who has a long history of headaches who recently had a very severe headache for which she was taken to the emergency department a CAT scan of the brain demonstrated a cyst with shift of the brain stem and an MRI confirmed this   This does appear to be an arachnoid cyst under pressure with distortion of the alignment of the brain stem  It is likely that her cranial neuropathies are related to this mass  It is unclear as to whether treatment will improve any of the symptoms however it will likely prevent a progression of the symptoms  I recommended the following intervention: Image guided right retromastoid craniotomy for fenestration and placement of internal cystoscopy subarachnoid shunt  To OR on 3/1/17  The patient reports upper extremity weakness (right arm), but no dizziness, no speech disturbances, no visual complaints, no neck stiffness, no fever, no vertigo, no facial weakness, no cognitive difficulties, no wound drainage, no ataxia, no lower extremity weakness and no photophobia    The patient presents with complaints of bilateral frontal headache Symptoms are improving (around the eyes)  Physical Examination:   Test Results:   Assessment:   Plan:        Review of Systems    Constitutional: feeling tired, but no fever, not feeling poorly and no chills  Eyes: No complaints of eye pain, no red eyes, no eyesight problems, no discharge, no dry eyes, no itching of eyes  The patient presents with complaints of right ear hearing loss  Cardiovascular: No complaints of slow heart rate, no fast heart rate, no chest pain, no palpitations, no leg claudication, no lower extremity edema  Respiratory: No complaints of shortness of breath, no wheezing, no cough, no SOB on exertion, no orthopnea, no PND  Gastrointestinal: No complaints of abdominal pain, no constipation, no nausea or vomiting, no diarrhea, no bloody stools  Genitourinary: No complaints of dysuria, no incontinence, no pelvic pain, no dysmenorrhea, no vaginal discharge or bleeding  Musculoskeletal: No complaints of arthralgias, no myalgias, no joint swelling or stiffness, no limb pain or swelling  Integumentary: No complaints of skin rash or lesions, no itching, no skin wounds, no breast pain or lump     Neurological: headache, numbness (incision area), limb weakness (right arm but doing PT and OT) and difficulty walking (doing PT and OT), but as noted in HPI and no tingling  Psychiatric: Not suicidal, no sleep disturbance, no anxiety or depression, no change in personality, no emotional problems  Endocrine: No complaints of proptosis, no hot flashes, no muscle weakness, no deepening of the voice, no feelings of weakness  Hematologic/Lymphatic: No complaints of swollen glands, no swollen glands in the neck, does not bleed easily, does not bruise easily  Active Problems    1  Abdominal migraine, not intractable (346 20) (G43 D0)   2  Abnormal CT of brain (793 0) (R90 89)   3  Acute pharyngitis (462) (J02 9)   4  Arachnoid cyst (348 0) (G93 0)   5  Birth control (V25 9) (Z30 9)   6  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)   7  Degeneration of intervertebral disc of lumbar region (722 52) (M51 36)   8  Degenerative cervical disc (722 4) (M50 30)   9  Degenerative disc disease, lumbar (722 52) (M51 36)   10  Depression (311) (F32 9)   11  High blood pressure (401 9) (I10)   12  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)   13  Meralgia paresthetica, unspecified lower limb (355 1) (G57 10)   14  Preoperative clearance (V72 84) (Z01 818)   15  Right shoulder pain (719 41) (M25 511)   16  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)   17  Sciatica (724 3) (M54 30)   18  Screening for lipid disorders (V77 91) (Z13 220)   19  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) (M47 816)    Social History    · Completed college, associates degree   · Current every day smoker (305 1) (F17 200)   · No drug use   · Occupation   · [address] [place] [address] Avenue   · Part-time employment   · Rarely consumes alcohol (V49 89) (Z78 9)    Current Meds   1  Amoxicillin 500 MG Oral Capsule; TAKE 1 CAPSULE 3 times daily; Therapy: 12Apr2017 to (Evaluate:26Apr2017)  Requested for: 12Apr2017; Last   Rx:12Apr2017 Ordered   2   Butalbital-APAP-Caffeine -40 MG Oral Capsule; TAKE 1 CAPSULE Every 4 hours   PRN migraine; Therapy: 52MNU1349 to (Evaluate:22Mar2017); Last Rx:17Mar2017 Ordered   3  Gabapentin 300 MG Oral Capsule; TAKE 1 CAPSULE  IN THE MORNING AND 2   CAPSULES IN THE EVENING; Therapy: 20Apr2016 to (Marifer King)  Requested for: 20Apr2016; Last   Rx:20Apr2016 Ordered   4  OxyCODONE HCl - 5 MG Oral Tablet; TAKE 1 TABLET Every 4 hours PRN pain; Therapy: 03EHC0470 to (Evaluate:27Mar2017); Last Rx:17Mar2017 Ordered   5  Valium 10 MG Oral Tablet; Therapy: (Recorded:17Mar2017) to Recorded    Allergies    1  No Known Drug Allergies    Vitals   Recorded: 27Apr2017 01:55PM   Temperature 98 2 F   Heart Rate 103   Respiration 16   Systolic 435   Diastolic 91   Height 5 ft 7 in   Weight 238 lb    BMI Calculated 37 28   BSA Calculated 2 18     Physical Exam   (Incision is clean and dry and healing well)   Mental Status: Alert and Oriented x3  Memory is intact  Attentive  Speech is articulate and fluent  Knowledge and vocabulary consistent with education  Grossly nonfocal     Cranial Nerve Exam:  2nd cranial nerve: Normal with no noted deficit  3rd, 4th, and 6th cranial nerves: PERRLA and EOMI without later gaze nystagmus  5th cranial nerve: Normal with no noted deficit  7th cranial nerve: Face symmetrical at grimace and at rest   8th cranial nerve: Grossly intact to finger rub bilaterally  9th and 10th cranial nerves: Uvula is mideline and gag reflex present  11th cranial nerve: Shoulder shrug equal bilaterally  12th cranial nerve: Tongue midline, no atrophy present  Motor System - Upper Extremities: Muscle strength: 5/5 bilaterally  Muscle tone: Normal bilaterally  Muscle Bulk: Normal Muscle bulk bilaterally  Motor System - Lower Extremities: Muscle strength: 5/5 bilaterally  Muscle tone: Normal bilaterally  Muscle Bulk: Normal Muscle bulk bilaterally     Reflexes: Reflexes: Bicep reflex intact, brachioradialis reflex intact, tricep reflex intact, achilles reflex intact, patellar reflex intact bilaterally and fine finger movement  Babinski's reflex is down going bilaterally  Man's sign is absent bilaterally  Coordination: Coordination: Finger to nose was normal, heel to knee to shin was normal able to perform rapid alternating movements well bilaterally  Involuntary movements: None   Sensory: Sensation:Sensation grossly intact to light tough and pinprick  Gait and Station: Gait and station: Davidson with a normal gait and station  Tandem walk is normal, Heel walk and toe walk intact and without sign of paresis  Results/Data         post op/ current         I personally reviewed the in detail with the patient         Signatures   Electronically signed by : BEVERLEY Pal ; Apr 27 2017  3:00PM EST                       (Author)

## 2018-01-17 NOTE — MISCELLANEOUS
Message  Spoke with Dr Haley Walker regarding patient MRI results, his office to call patient for appointment  Pt made aware of same, reviewed s/s to call office immediately, such as difficulty swallowing, balance, gait disturbances, hearing loss as instructed by Dr Haley Walker  Pt satisfied, to drop off FMLA papers at office  Active Problems    1  Abdominal migraine, not intractable (346 20) (G43 D0)   2  Abnormal CT of brain (793 0) (R90 89)   3  Birth control (V25 9) (Z30 9)   4  Degeneration of intervertebral disc of lumbar region (722 52) (M51 36)   5  Degenerative cervical disc (722 4) (M50 30)   6  Degenerative disc disease, lumbar (722 52) (M51 36)   7  Depression (311) (F32 9)   8  High blood pressure (401 9) (I10)   9  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)   10  Meralgia paresthetica, unspecified lower limb (355 1) (G57 10)   11  Right shoulder pain (719 41) (M25 511)   12  Sciatica (724 3) (M54 30)   13  Screening for lipid disorders (V77 91) (Z13 220)   14  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) (M47 816)    Current Meds   1  CeleXA 40 MG Oral Tablet (Citalopram Hydrobromide); Take 2 tablets once a day; Therapy: (Recorded:34Hra7311) to Recorded   2  Citalopram Hydrobromide 40 MG Oral Tablet (CeleXA); TAKE 1 TABLET DAILY; Therapy: 06QGW7553 to (Evaluate:28Jun2016)  Requested for: 57UTO5078; Last   Rx:70Dgd9466 Ordered   3  Gabapentin 300 MG Oral Capsule (Neurontin); TAKE 1 CAPSULE  IN THE MORNING   AND 2 CAPSULES IN THE EVENING; Therapy: 06Pap7943 to ([phone])  Requested for: 80Uhv9493; Last   Rx:85Gbs7991 Ordered   4  Hydrochlorothiazide 25 MG Oral Tablet; Therapy: (Recorded:43Qmp6292) to Recorded   5  Ibuprofen 800 MG Oral Tablet; TAKE 1 TABLET 3 TIMES DAILY AS NEEDED; Therapy: 67Vqj5413 to (Evaluate:35Mnr7700)  Requested for: 98Uhe9675; Last   Rx:46Dld1529 Ordered   6  Metoclopramide HCl - 10 MG Oral Tablet;  Take 1 tablet 3 times daily as needed; Therapy: 22Jul2016 to (Evaluate:99Wkp2768)  Requested for: 22Jul2016; Last   Rx:22Jul2016 Ordered   7  OxyCODONE HCl - 10 MG Oral Tablet; Take 1 tablet twice daily; Therapy: 20Apr2016 to (Evaluate:27Apr2016); Last Rx:20Apr2016 Ordered   8  Xanax 0 5 MG Oral Tablet (ALPRAZolam); take 1 tablet 4 times a day; Therapy: (Recorded:03Qfe5645) to Recorded    Allergies    1  No Known Drug Allergies    Signatures   Electronically signed by :  TIERRA Caballero; Jul 29 2016 12:07PM EST                       (Author)

## 2018-01-22 VITALS
HEART RATE: 103 BPM | TEMPERATURE: 98.2 F | DIASTOLIC BLOOD PRESSURE: 91 MMHG | HEIGHT: 67 IN | RESPIRATION RATE: 16 BRPM | BODY MASS INDEX: 37.35 KG/M2 | WEIGHT: 238 LBS | SYSTOLIC BLOOD PRESSURE: 145 MMHG

## 2018-01-22 VITALS
HEART RATE: 105 BPM | OXYGEN SATURATION: 98 % | SYSTOLIC BLOOD PRESSURE: 116 MMHG | TEMPERATURE: 97 F | WEIGHT: 231 LBS | BODY MASS INDEX: 36.26 KG/M2 | DIASTOLIC BLOOD PRESSURE: 72 MMHG | HEIGHT: 67 IN | RESPIRATION RATE: 17 BRPM

## 2018-01-22 VITALS
BODY MASS INDEX: 36.1 KG/M2 | OXYGEN SATURATION: 98 % | HEIGHT: 67 IN | DIASTOLIC BLOOD PRESSURE: 76 MMHG | SYSTOLIC BLOOD PRESSURE: 120 MMHG | RESPIRATION RATE: 17 BRPM | HEART RATE: 99 BPM | TEMPERATURE: 97.6 F | WEIGHT: 230 LBS

## 2018-03-10 ENCOUNTER — HOSPITAL ENCOUNTER (EMERGENCY)
Facility: HOSPITAL | Age: 33
Discharge: HOME | End: 2018-03-10
Attending: EMERGENCY MEDICINE | Admitting: EMERGENCY MEDICINE
Payer: COMMERCIAL

## 2018-03-10 ENCOUNTER — APPOINTMENT (EMERGENCY)
Dept: ULTRASOUND IMAGING | Facility: HOSPITAL | Age: 33
End: 2018-03-10
Payer: COMMERCIAL

## 2018-03-10 VITALS
BODY MASS INDEX: 38.23 KG/M2 | HEART RATE: 100 BPM | RESPIRATION RATE: 18 BRPM | WEIGHT: 244.06 LBS | OXYGEN SATURATION: 98 % | SYSTOLIC BLOOD PRESSURE: 104 MMHG | DIASTOLIC BLOOD PRESSURE: 58 MMHG | TEMPERATURE: 98.1 F

## 2018-03-10 DIAGNOSIS — M79.606 LEG PAIN: Primary | ICD-10-CM

## 2018-03-10 LAB
ALBUMIN SERPL BCP-MCNC: 3.5 G/DL (ref 3.5–5)
ALP SERPL-CCNC: 101 U/L (ref 46–116)
ALT SERPL W P-5'-P-CCNC: 23 U/L (ref 12–78)
ANION GAP SERPL CALCULATED.3IONS-SCNC: 8 MMOL/L (ref 4–13)
AST SERPL W P-5'-P-CCNC: 11 U/L (ref 5–45)
BASOPHILS # BLD AUTO: 0.05 THOUSANDS/ΜL (ref 0–0.1)
BASOPHILS NFR BLD AUTO: 0 % (ref 0–1)
BILIRUB SERPL-MCNC: 0.4 MG/DL (ref 0.2–1)
BUN SERPL-MCNC: 9 MG/DL (ref 5–25)
CALCIUM SERPL-MCNC: 8.7 MG/DL (ref 8.3–10.1)
CHLORIDE SERPL-SCNC: 103 MMOL/L (ref 100–108)
CO2 SERPL-SCNC: 26 MMOL/L (ref 21–32)
CREAT SERPL-MCNC: 0.61 MG/DL (ref 0.6–1.3)
EOSINOPHIL # BLD AUTO: 0.14 THOUSAND/ΜL (ref 0–0.61)
EOSINOPHIL NFR BLD AUTO: 1 % (ref 0–6)
ERYTHROCYTE [DISTWIDTH] IN BLOOD BY AUTOMATED COUNT: 12.9 % (ref 11.6–15.1)
GFR SERPL CREATININE-BSD FRML MDRD: 119 ML/MIN/1.73SQ M
GLUCOSE SERPL-MCNC: 102 MG/DL (ref 65–140)
HCT VFR BLD AUTO: 40 % (ref 34.8–46.1)
HGB BLD-MCNC: 13.5 G/DL (ref 11.5–15.4)
LYMPHOCYTES # BLD AUTO: 3.34 THOUSANDS/ΜL (ref 0.6–4.47)
LYMPHOCYTES NFR BLD AUTO: 28 % (ref 14–44)
MCH RBC QN AUTO: 28.3 PG (ref 26.8–34.3)
MCHC RBC AUTO-ENTMCNC: 33.8 G/DL (ref 31.4–37.4)
MCV RBC AUTO: 84 FL (ref 82–98)
MONOCYTES # BLD AUTO: 0.84 THOUSAND/ΜL (ref 0.17–1.22)
MONOCYTES NFR BLD AUTO: 7 % (ref 4–12)
NEUTROPHILS # BLD AUTO: 7.63 THOUSANDS/ΜL (ref 1.85–7.62)
NEUTS SEG NFR BLD AUTO: 64 % (ref 43–75)
PLATELET # BLD AUTO: 270 THOUSANDS/UL (ref 149–390)
PMV BLD AUTO: 11.1 FL (ref 8.9–12.7)
POTASSIUM SERPL-SCNC: 3.8 MMOL/L (ref 3.5–5.3)
PROT SERPL-MCNC: 7.2 G/DL (ref 6.4–8.2)
RBC # BLD AUTO: 4.77 MILLION/UL (ref 3.81–5.12)
SODIUM SERPL-SCNC: 137 MMOL/L (ref 136–145)
WBC # BLD AUTO: 12 THOUSAND/UL (ref 4.31–10.16)

## 2018-03-10 PROCEDURE — 93971 EXTREMITY STUDY: CPT

## 2018-03-10 PROCEDURE — 99284 EMERGENCY DEPT VISIT MOD MDM: CPT

## 2018-03-10 PROCEDURE — 85025 COMPLETE CBC W/AUTO DIFF WBC: CPT | Performed by: EMERGENCY MEDICINE

## 2018-03-10 PROCEDURE — 36415 COLL VENOUS BLD VENIPUNCTURE: CPT | Performed by: EMERGENCY MEDICINE

## 2018-03-10 PROCEDURE — 80053 COMPREHEN METABOLIC PANEL: CPT | Performed by: EMERGENCY MEDICINE

## 2018-03-10 NOTE — DISCHARGE INSTRUCTIONS
Leg Pain   WHAT YOU NEED TO KNOW:   Leg pain may be caused by a variety of health conditions  Your tests did not show any broken bones or blood clots  DISCHARGE INSTRUCTIONS:   Return to the emergency department if:   · You have a fever  · Your leg starts to swell  · Your leg pain gets worse  · You have numbness or tingling in your leg or toes  · You cannot put any weight on or move your leg  Contact your healthcare provider if:   · Your pain does not decrease, even after treatment  · You have questions or concerns about your condition or care  Medicines:   · NSAIDs , such as ibuprofen, help decrease swelling, pain, and fever  This medicine is available with or without a doctor's order  NSAIDs can cause stomach bleeding or kidney problems in certain people  If you take blood thinner medicine, always ask your healthcare provider if NSAIDs are safe for you  Always read the medicine label and follow directions  · Take your medicine as directed  Contact your healthcare provider if you think your medicine is not helping or if you have side effects  Tell him of her if you are allergic to any medicine  Keep a list of the medicines, vitamins, and herbs you take  Include the amounts, and when and why you take them  Bring the list or the pill bottles to follow-up visits  Carry your medicine list with you in case of an emergency  Follow up with your healthcare provider as directed: You may need more tests to find the cause of your leg pain  You may need to see an orthopedic specialist or a physical therapist  Write down your questions so you remember to ask them during your visits  Manage your leg pain:   · Rest  your injured leg so that it can heal  You may need an immobilizer, brace, or splint to limit the movement of your leg  You may need to avoid putting any weight on your leg for at least 48 hours  Return to normal activities as directed      · Ice  the injury for 20 minutes every 4 hours for up to 24 hours, or as directed  Use an ice pack, or put crushed ice in a plastic bag  Cover it with a towel to protect your skin  Ice helps prevent tissue damage and decreases swelling and pain  · Elevate  your injured leg above the level of your heart as often as you can  This will help decrease swelling and pain  If possible, prop your leg on pillows or blankets to keep the area elevated comfortably  · Use assistive devices as directed  You may need to use a cane or crutches  Assistive devices help decrease pain and pressure on your leg when you walk  Ask your healthcare provider for more information about assistive devices and how to use them correctly  · Maintain a healthy weight  Extra body weight can cause pressure and pain in your hip, knee, and ankle joints  Ask your healthcare provider how much you should weigh  Ask him to help you create a weight loss plan if you are overweight  © 2017 [employer] Information is for End User's use only and may not be sold, redistributed or otherwise used for commercial purposes  All illustrations and images included in CareNotes® are the copyrighted property of [employer]  or Daren Hunter  The above information is an  only  It is not intended as medical advice for individual conditions or treatments  Talk to your doctor, nurse or pharmacist before following any medical regimen to see if it is safe and effective for you  Leg Cramps   WHAT YOU NEED TO KNOW:   A leg cramp is a sudden, painful squeeze in your calf (lower leg) or thigh (upper leg) muscles  The muscle may twitch under the skin or feel hard  Your leg may feel sore long after the muscles relax  DISCHARGE INSTRUCTIONS:   To stretch your leg:  Warm up your muscles before you stretch  Take a short walk or run slowly in place  If you get leg cramps while you sleep, you may also want to stretch before bedtime   The following exercises stretch the calves and thighs to help stop or prevent leg cramps:  · To stretch your calf and heel:      ¨ Stand up and place the palms of your hands against a wall  ¨ Lean into the wall, with one leg behind the other  Bend the front leg and keep the back leg straight  ¨ Press the heel of your back leg into the floor  ¨ Hold for 15 to 30 seconds, then switch sides  · To stretch the back of your knee and thigh:      ¨ Sit on the floor with your legs straight in front of you  Do not point your toes  ¨ Place the palms of your hands at your sides on the floor  Slide your hands past your hips toward your ankles  ¨ Move toward your ankles until you feel a stretch in the back of your legs  Do not try to touch your head to your knees or round your back  ¨ Hold for 30 seconds  Drink plenty of liquids during exercise:  Water and other liquids help prevent cramps by replacing fluids lost in sweat  Drink more liquids when you are active in hot weather  To stop a leg cramp if it happens again:   · Stretch and massage your muscle to stop the cramp  · Apply heat or ice packs to the cramp  A heating pad or hot pack may help relieve tight muscles  An ice pack or crushed ice in a bag, wrapped in a towel can soothe tender or sore muscles  Take your medicine as directed:  Call your healthcare provider if you think your medicine is not helping or if you have side effects  Tell him if you are taking any vitamins, herbs, or other medicines  Keep a list of the medicines you take  Include the amounts, and when and why you take them  Bring the list or the pill bottles to follow-up visits  Follow up with your healthcare provider as directed:  Write down any questions you have so you remember to ask them in your follow-up visits  Contact your healthcare provider if:   · Your leg cramps get worse or happen more often  · Your leg feels numb  · Your leg cramps do not go away with stretching or massage      · You feel dizzy, lightheaded, or confused when you exercise in hot weather  You may have a headache or blurred vision  © 2017 [employer]  Information is for End User's use only and may not be sold, redistributed or otherwise used for commercial purposes  All illustrations and images included in CareNotes® are the copyrighted property of A [employer] M [employer]  or Daren Hunter  The above information is an  only  It is not intended as medical advice for individual conditions or treatments  Talk to your doctor, nurse or pharmacist before following any medical regimen to see if it is safe and effective for you

## 2018-03-10 NOTE — ED PROVIDER NOTES
History  Chief Complaint   Patient presents with    Leg Pain     RIGHT LEG PAIN FOR 3 DAYS  NO TRAUMA     Patient is a 66-year-old female that reports to the emergency department with achiness in her right calf and right upper back of the leg for the past 3 days  She does note that she drives frequently and recently took a 5 hour trip to [place] and back  No fevers, chills, sweats, nausea, vomiting, diarrhea, lightheadedness, dizziness, shortness of breath  Medical decision making:  Well-appearing 66-year-old female who reports to the emergency department with right leg pain but no swelling  There is a small amount of redness on the left lower inner leg  No skin sloughing or any other signs of red flag type rash  No signs of infection, no warmth  Negative Manuel sign  Will rule out DVT, reassess  The patient (and any family present) verbalized understanding of the discharge instructions and warnings that would necessitate return to the Emergency Department  Gave verbal in addition to written discharge instructions  Specifically highlighted areas of special concern regarding the written and verbal discharge instructions and return precautions  All questions were answered prior to discharge  None       Past Medical History:   Diagnosis Date    Anxiety     Arachnoid cyst     Chronic pain     back    Depression     Hypertension     Migraine        Past Surgical History:   Procedure Laterality Date     SECTION      CHOLECYSTECTOMY      CRANIOTOMY Right 3/1/2017    Procedure: RIGHT IMAGE GUIDED RETROMASTOID CRANIOTOMY FOR FENESTRATION AND PLACEMENT OF INTERNAL CYSTO-SUBARACHNOID SHUNT;  Surgeon: Jaclyn Neri MD;  Location: [facility];  Service:    [facility] MOUTH SURGERY      WISDOM TOOTH EXTRACTION         History reviewed  No pertinent family history  I have reviewed and agree with the history as documented      Social History   Substance Use Topics    Smoking status: Current Every Day Smoker     Packs/day: 0 50    Smokeless tobacco: Never Used    Alcohol use No        Review of Systems   Constitutional: Negative for fever  Respiratory: Negative for chest tightness, shortness of breath and wheezing  Cardiovascular: Negative for chest pain  Gastrointestinal: Negative for abdominal pain, nausea and vomiting  Skin: Positive for color change  All other systems reviewed and are negative  Physical Exam  ED Triage Vitals [03/10/18 1540]   Temperature Pulse Respirations Blood Pressure SpO2   98 1 °F (36 7 °C) 100 18 104/58 98 %      Temp Source Heart Rate Source Patient Position - Orthostatic VS BP Location FiO2 (%)   Temporal Left Sitting Left arm --      Pain Score       8           Orthostatic Vital Signs  Vitals:    03/10/18 1540   BP: 104/58   Pulse: 100   Patient Position - Orthostatic VS: Sitting       Physical Exam   Constitutional: She is oriented to person, place, and time  She appears well-developed and well-nourished  HENT:   Head: Normocephalic and atraumatic  Right Ear: External ear normal    Left Ear: External ear normal    Eyes: Conjunctivae and EOM are normal    Neck: Normal range of motion  Neck supple  No JVD present  No tracheal deviation present  Cardiovascular: Normal rate, regular rhythm and normal heart sounds  Pulmonary/Chest: Effort normal  No respiratory distress  She has no wheezes  She has no rales  Abdominal: Soft  Bowel sounds are normal  There is no tenderness  There is no rebound and no guarding  Musculoskeletal: She exhibits no edema or tenderness  Neurological: She is alert and oriented to person, place, and time  Skin: Skin is warm and dry  No rash noted  No erythema  Psychiatric: She has a normal mood and affect  Thought content normal    Nursing note and vitals reviewed        ED Medications  Medications - No data to display    Diagnostic Studies  Results Reviewed     Procedure Component Value Units Date/Time Comprehensive metabolic panel [[id number]] Collected:  03/10/18 1557    Lab Status:  Final result Specimen:  Blood from Hand, Right Updated:  03/10/18 1617     Sodium 137 mmol/L      Potassium 3 8 mmol/L      Chloride 103 mmol/L      CO2 26 mmol/L      Anion Gap 8 mmol/L      BUN 9 mg/dL      Creatinine 0 61 mg/dL      Glucose 102 mg/dL      Calcium 8 7 mg/dL      AST 11 U/L      ALT 23 U/L      Alkaline Phosphatase 101 U/L      Total Protein 7 2 g/dL      Albumin 3 5 g/dL      Total Bilirubin 0 40 mg/dL      eGFR 119 ml/min/1 73sq m     Narrative:         National Kidney Disease Education Program recommendations are as follows:  GFR calculation is accurate only with a steady state creatinine  Chronic Kidney disease less than 60 ml/min/1 73 sq  meters  Kidney failure less than 15 ml/min/1 73 sq  meters      CBC and differential [[id number]]  (Abnormal) Collected:  03/10/18 1557    Lab Status:  Final result Specimen:  Blood from Hand, Right Updated:  03/10/18 1602     WBC 12 00 (H) Thousand/uL      RBC 4 77 Million/uL      Hemoglobin 13 5 g/dL      Hematocrit 40 0 %      MCV 84 fL      MCH 28 3 pg      MCHC 33 8 g/dL      RDW 12 9 %      MPV 11 1 fL      Platelets 331 Thousands/uL      Neutrophils Relative 64 %      Lymphocytes Relative 28 %      Monocytes Relative 7 %      Eosinophils Relative 1 %      Basophils Relative 0 %      Neutrophils Absolute 7 63 (H) Thousands/µL      Lymphocytes Absolute 3 34 Thousands/µL      Monocytes Absolute 0 84 Thousand/µL      Eosinophils Absolute 0 14 Thousand/µL      Basophils Absolute 0 05 Thousands/µL                  VAS lower limb venous duplex study, unilateral/limited    (Results Pending)              Procedures  Procedures       Phone Contacts  ED Phone Contact    ED Course  ED Course                                MDM  CritCare Time    Disposition  Final diagnoses:   Leg pain     Time reflects when diagnosis was documented in both MDM as applicable and the Disposition within this note     Time User Action Codes Description Comment    3/10/2018  4:31 PM Abi Dickinsonsunitha FLORENCE Add [M09 486] Leg pain       ED Disposition     ED Disposition Condition Comment    Discharge  [facility] discharge to home/self care  Condition at discharge: Good        Follow-up Information     Follow up With Specialties Details Why [address] [facility], DO Family Medicine In 1 day  [address] [facility]  [phone]          Patient's Medications   Discharge Prescriptions    No medications on file     No discharge procedures on file      ED Provider  Electronically Signed by           Jonathon Babin MD  03/10/18 6175

## 2018-03-11 PROCEDURE — 93971 EXTREMITY STUDY: CPT | Performed by: SURGERY

## 2018-04-17 ENCOUNTER — TRANSCRIBE ORDERS (OUTPATIENT)
Dept: ADMINISTRATIVE | Facility: HOSPITAL | Age: 33
End: 2018-04-17

## 2018-04-18 ENCOUNTER — TELEPHONE (OUTPATIENT)
Dept: NEUROSURGERY | Facility: CLINIC | Age: 33
End: 2018-04-18

## 2018-04-24 ENCOUNTER — HOSPITAL ENCOUNTER (OUTPATIENT)
Dept: CT IMAGING | Facility: HOSPITAL | Age: 33
Discharge: HOME | End: 2018-04-24
Attending: NEUROLOGICAL SURGERY
Payer: COMMERCIAL

## 2018-04-24 DIAGNOSIS — G93.0 CEREBRAL CYSTS: ICD-10-CM

## 2018-04-24 PROCEDURE — 70450 CT HEAD/BRAIN W/O DYE: CPT

## 2018-05-14 ENCOUNTER — APPOINTMENT (EMERGENCY)
Dept: CT IMAGING | Facility: HOSPITAL | Age: 33
End: 2018-05-14
Payer: COMMERCIAL

## 2018-05-14 ENCOUNTER — HOSPITAL ENCOUNTER (EMERGENCY)
Facility: HOSPITAL | Age: 33
Discharge: HOME | End: 2018-05-14
Attending: EMERGENCY MEDICINE
Payer: COMMERCIAL

## 2018-05-14 VITALS
HEART RATE: 92 BPM | SYSTOLIC BLOOD PRESSURE: 137 MMHG | TEMPERATURE: 98.7 F | BODY MASS INDEX: 39.16 KG/M2 | OXYGEN SATURATION: 97 % | WEIGHT: 250 LBS | DIASTOLIC BLOOD PRESSURE: 72 MMHG | RESPIRATION RATE: 18 BRPM

## 2018-05-14 DIAGNOSIS — R51.9 HEADACHE: Primary | ICD-10-CM

## 2018-05-14 PROCEDURE — 99284 EMERGENCY DEPT VISIT MOD MDM: CPT

## 2018-05-14 PROCEDURE — 96375 TX/PRO/DX INJ NEW DRUG ADDON: CPT

## 2018-05-14 PROCEDURE — 70450 CT HEAD/BRAIN W/O DYE: CPT

## 2018-05-14 PROCEDURE — 96365 THER/PROPH/DIAG IV INF INIT: CPT

## 2018-05-14 RX ORDER — DIPHENHYDRAMINE HYDROCHLORIDE 50 MG/ML
25 INJECTION INTRAMUSCULAR; INTRAVENOUS ONCE
Status: COMPLETED | OUTPATIENT
Start: 2018-05-14 | End: 2018-05-14

## 2018-05-14 RX ORDER — METOCLOPRAMIDE HYDROCHLORIDE 5 MG/ML
10 INJECTION INTRAMUSCULAR; INTRAVENOUS ONCE
Status: COMPLETED | OUTPATIENT
Start: 2018-05-14 | End: 2018-05-14

## 2018-05-14 RX ORDER — BUTALBITAL, ACETAMINOPHEN AND CAFFEINE 50; 325; 40 MG/1; MG/1; MG/1
1 TABLET ORAL EVERY 4 HOURS PRN
COMMUNITY

## 2018-05-14 RX ORDER — ACETAMINOPHEN 325 MG/1
975 TABLET ORAL ONCE
Status: COMPLETED | OUTPATIENT
Start: 2018-05-14 | End: 2018-05-14

## 2018-05-14 RX ORDER — KETOROLAC TROMETHAMINE 30 MG/ML
15 INJECTION, SOLUTION INTRAMUSCULAR; INTRAVENOUS ONCE
Status: COMPLETED | OUTPATIENT
Start: 2018-05-14 | End: 2018-05-14

## 2018-05-14 RX ADMIN — ACETAMINOPHEN 975 MG: 325 TABLET, FILM COATED ORAL at 19:37

## 2018-05-14 RX ADMIN — DIPHENHYDRAMINE HYDROCHLORIDE 25 MG: 50 INJECTION, SOLUTION INTRAMUSCULAR; INTRAVENOUS at 19:51

## 2018-05-14 RX ADMIN — KETOROLAC TROMETHAMINE 15 MG: 30 INJECTION, SOLUTION INTRAMUSCULAR at 19:52

## 2018-05-14 RX ADMIN — Medication 1000 MG: at 20:08

## 2018-05-14 RX ADMIN — SODIUM CHLORIDE 1000 ML: 0.9 INJECTION, SOLUTION INTRAVENOUS at 19:56

## 2018-05-14 RX ADMIN — METOCLOPRAMIDE 10 MG: 5 INJECTION, SOLUTION INTRAMUSCULAR; INTRAVENOUS at 19:53

## 2018-05-15 NOTE — ED PROVIDER NOTES
History  Chief Complaint   Patient presents with    Headache - Recurrent or Known Dx Migraines     pt has had a headache for past 3 days on right side behind eye  hx of migraines  follows with nuerology after having an arachnoid cyst removed last year  due to different insurance cannot get in with neuro until   also c/o left abdominal pain that radiates down through lower abdomen     HPI     Patient is a 26year old female with history of migraines who reports to the emergency department with a headache  She notes the headache is similar to many migraines in the past   She does have a history of a intracranial shunt secondary to an arachnoid cyst   No vomiting  She denies any current abdominal pain  No fevers, chills, sweats  HA was gradual onset  No f/c/s  No neck stiffness  No focal neurological symptoms  No temporal artery pain/tenderness  No vision changes  Headaches are not increasing in severity or frequency  Not worse in the AM  No head trauma  No dysarthria, nystagmus, dysphagia, diplopia, aphasia, vertigo, ataxia, visions loss, dysmetria  Normal finger to nose, gait, rapid alternating movement,  tandem gait, strength and sensation in bilat upper and lower extremities  Normal upper and lower reflexes  No pronator drift  Normal heel to shin  Decrease ability to look left and right on EOM testing, the patient notes that this is chronic  No visual field defects  CN 2-13 intact  GCS 15  AOx3  Medical decision makin-year-old female, given that she does have a shunt, will CT the head but she notes that this headache is similar to her prior headaches  The patient (and any family present) verbalized understanding of the discharge instructions and warnings that would necessitate return to the Emergency Department  Gave verbal in addition to written discharge instructions     Specifically highlighted areas of special concern regarding the written and verbal discharge instructions and return precautions  All questions were answered prior to discharge  Per prior notes------------------  Active Problems     1  Abdominal migraine, not intractable (346 20) (G43 D0)   2  Abnormal CT of brain (793 0) (R90 89)   3  Acute pharyngitis (462) (J02 9)   4  Allergic rhinitis (477 9) (J30 9)   5  Arachnoid cyst (348 0) (G93 0)   6  Birth control (V25 9) (Z30 9)   7  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)   8  Degeneration of intervertebral disc of lumbar region (722 52) (M51 36)   9  Degenerative cervical disc (722 4) (M50 30)   10  Degenerative disc disease, lumbar (722 52) (M51 36)   11  Depression (311) (F32 9)   12  Depression with anxiety (300 4) (F41 8)   13  Fatigue (780 79) (R53 83)   14  Flu vaccine need (V04 81) (Z23)   15  High blood pressure (401 9) (I10)   16  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)   17  Meralgia paresthetica, unspecified lower limb (355 1) (G57 10)   18  Preoperative clearance (V72 84) (Z01 818)   19  Right shoulder pain (719 41) (M25 511)   20  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)   21  Sciatica (724 3) (M54 30)   22  Screening for lipid disorders (V77 91) (Z13 220)   23  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) (M47 816)   24  Symptoms of urinary tract infection (788 99) (R39 9)     Past Medical History  1  History of No known problems (V49 89) (Z78 9)  Active Problems And Past Medical History Reviewed: The active problems and past medical history were reviewed and updated today  Hospital Course: Patient was admitted on 3/1 for elective right retromastoid craniotomy for placement of cystosubarachnoid shunt  She tolerated the procedure with 700cc of blood loss, but otherwise no complications  She was stable in PACU prior to discharge to the ICU  She was stable in ICU overnight, Hgb 10 7, exam with stable CN6 palsy, but without other focal abnormalities   With right retromastoid swelling, expected post-op  She had post-op CT also with expected changes  She was experiencing headaches, improving with pain regimen  She was evaluated by PT/OT and cleared for d/c home with outpatient PT  Patient was discharged to medical/surgical unit  She remained medically and neurologically stable on POD2, her headaches were improved, particularly with fioricet  She was eating, ambulating, and voiding without issue  She had a stable Hgb, tachycardia which was evident in AM resolved in afternoon  Flushed face and neck pain improved prior to discharge  She was discharged home with decadron, post-operative instructions/restrictions provided  ------------------------------------  Prior to Admission Medications   Prescriptions Last Dose Informant Patient Reported? Taking? butalbital-acetaminophen-caffeine (FIORICET,ESGIC) -40 mg per tablet   Yes Yes   Sig: Take 1 tablet by mouth every 4 (four) hours as needed for headaches      Facility-Administered Medications: None       Past Medical History:   Diagnosis Date    Anxiety     Arachnoid cyst     Chronic pain     back    Depression     Hypertension     Migraine        Past Surgical History:   Procedure Laterality Date     SECTION      CHOLECYSTECTOMY      CRANIOTOMY Right 3/1/2017    Procedure: RIGHT IMAGE GUIDED RETROMASTOID CRANIOTOMY FOR FENESTRATION AND PLACEMENT OF INTERNAL CYSTO-SUBARACHNOID SHUNT;  Surgeon: David Noonan MD;  Location: [facility];  Service:    [facility] SURGERY      WISDOM TOOTH EXTRACTION         History reviewed  No pertinent family history  I have reviewed and agree with the history as documented  Social History   Substance Use Topics    Smoking status: Current Every Day Smoker     Packs/day: 0 50    Smokeless tobacco: Never Used    Alcohol use No        Review of Systems   Constitutional: Negative for chills and fever  Neurological: Positive for headaches  All other systems reviewed and are negative        Physical Exam  ED Triage Vitals [05/14/18 1838]   Temperature Pulse Respirations Blood Pressure SpO2   98 7 °F (37 1 °C) (!) 110 18 136/90 98 %      Temp Source Heart Rate Source Patient Position - Orthostatic VS BP Location FiO2 (%)   Temporal Monitor Sitting Left arm --      Pain Score       8           Orthostatic Vital Signs  Vitals:    05/14/18 1838 05/14/18 2000 05/14/18 2030   BP: 136/90 136/85 137/72   Pulse: (!) 110 93 92   Patient Position - Orthostatic VS: Sitting         Physical Exam   Constitutional: She is oriented to person, place, and time  She appears well-developed and well-nourished  HENT:   Head: Normocephalic and atraumatic  Right Ear: External ear normal    Left Ear: External ear normal    Eyes: Conjunctivae and EOM are normal    Neck: Normal range of motion  Neck supple  No JVD present  No tracheal deviation present  Cardiovascular: Normal rate, regular rhythm and normal heart sounds  Pulmonary/Chest: Effort normal  No respiratory distress  She has no wheezes  She has no rales  Abdominal: Soft  Bowel sounds are normal  There is no tenderness  There is no rebound and no guarding  Musculoskeletal: She exhibits no edema or tenderness  Neurological: She is alert and oriented to person, place, and time  Coordination normal    Skin: Skin is warm and dry  No rash noted  No erythema  Psychiatric: She has a normal mood and affect  Thought content normal    Nursing note and vitals reviewed        ED Medications  Medications   sodium chloride 0 9 % bolus 1,000 mL (1,000 mL Intravenous New Bag 5/14/18 1956)   acetaminophen (TYLENOL) tablet 975 mg (975 mg Oral Given 5/14/18 1937)   ketorolac (TORADOL) injection 15 mg (15 mg Intravenous Given 5/14/18 1952)   metoclopramide (REGLAN) injection 10 mg (10 mg Intravenous Given 5/14/18 1953)   valproate (DEPACON) 1,000 mg in sodium chloride 0 9 % 50 mL for headache (0 g Intravenous Stopped 5/14/18 2028)   diphenhydrAMINE (BENADRYL) injection 25 mg (25 mg Intravenous Given 5/14/18 1951)       Diagnostic Studies  Results Reviewed     Procedure Component Value Units Date/Time    POCT pregnancy, urine [[id number]]     Lab Status:  No result                  CT head without contrast   Final Result by Annabel Mcgregor MD (05/14 1954)      1  Stable focal prominent CSF space in the right posterior fossa anteriorly probably an arachnoid cyst with evidence of an intracranial shunt  This is not significantly changed  No acute intracranial abnormality  Workstation performed: [id number]                    Procedures  Procedures       Phone Contacts  ED Phone Contact    ED Course  ED Course as of May 14 2036   Mon May 14, 2018   2033 Impression       1   Stable focal prominent CSF space in the right posterior fossa anteriorly probably an arachnoid cyst with evidence of an intracranial shunt   This is not significantly changed   No acute intracranial abnormality  2034 Patients symptoms fully relieved, will dc home                                MDM  CritCare Time    Disposition  Final diagnoses:   Headache     Time reflects when diagnosis was documented in both MDM as applicable and the Disposition within this note     Time User Action Codes Description Comment    5/14/2018  8:36  [facility], [address] [place] [address] Headache       ED Disposition     ED Disposition Condition Comment    Discharge  [facility] discharge to home/self care  Condition at discharge: Good        Follow-up Information     Follow up With Specialties Details Why Contact Info    Jael Sutton DO Family Medicine In 2 days  [id number] [place] [address]  [phone]          Patient's Medications   Discharge Prescriptions    No medications on file     No discharge procedures on file      ED Provider  Electronically Signed by           Sita Ferreira MD  05/14/18 2037

## 2018-05-15 NOTE — ED NOTES
Pt states that her headache is gone  Pt requesting something to eat  Dr Taylor Dhaliwal made aware  States okay for patient to eat       Padmaja GERMAN Bauman  05/14/18 2037

## 2018-05-15 NOTE — DISCHARGE INSTRUCTIONS
Acute Headache   WHAT YOU NEED TO KNOW:   An acute headache is pain or discomfort that starts suddenly and gets worse quickly  You may have an acute headache only when you feel stress or eat certain foods  Other acute headache pain can happen every day, and sometimes several times a day  DISCHARGE INSTRUCTIONS:   Return to the emergency department if:   · You have severe pain  · You have numbness or weakness on one side of your face or body  · You have a headache that occurs after a blow to the head, a fall, or other trauma  · You have a headache, are forgetful or confused, or have trouble speaking  · You have a headache, stiff neck, and a fever  Contact your healthcare provider if:   · You have a constant headache and are vomiting  · You have a headache each day that does not get better, even after treatment  · You have changes in your headaches, or new symptoms that occur when you have a headache  · You have questions or concerns about your condition or care  Medicines: You may need any of the following:  · Prescription pain medicine  may be given  The medicine your healthcare provider recommends will depend on the kind of headaches you have  You will need to take prescription headache medicines as directed to prevent a problem called rebound headache  These headaches happen with regular use of pain relievers for headache disorders  · NSAIDs , such as ibuprofen, help decrease swelling, pain, and fever  This medicine is available with or without a doctor's order  NSAIDs can cause stomach bleeding or kidney problems in certain people  If you take blood thinner medicine, always ask your healthcare provider if NSAIDs are safe for you  Always read the medicine label and follow directions  · Acetaminophen  decreases pain and fever  It is available without a doctor's order  Ask how much to take and how often to take it  Follow directions   Read the labels of all other medicines you are using to see if they also contain acetaminophen, or ask your doctor or pharmacist  Acetaminophen can cause liver damage if not taken correctly  Do not use more than 3 grams (3,000 milligrams) total of acetaminophen in one day  · Antidepressants  may be given for some kinds of headaches  · Take your medicine as directed  Contact your healthcare provider if you think your medicine is not helping or if you have side effects  Tell him or her if you are allergic to any medicine  Keep a list of the medicines, vitamins, and herbs you take  Include the amounts, and when and why you take them  Bring the list or the pill bottles to follow-up visits  Carry your medicine list with you in case of an emergency  Manage your symptoms:   · Apply heat or ice  on the headache area  Use a heat or ice pack  For an ice pack, you can also put crushed ice in a plastic bag  Cover the pack or bag with a towel before you apply it to your skin  Ice and heat both help decrease pain, and heat also helps decrease muscle spasms  Apply heat for 20 to 30 minutes every 2 hours  Apply ice for 15 to 20 minutes every hour  Apply heat or ice for as long and for as many days as directed  You may alternate heat and ice  · Relax your muscles  Lie down in a comfortable position and close your eyes  Relax your muscles slowly  Start at your toes and work your way up your body  · Keep a record of your headaches  Write down when your headaches start and stop  Include your symptoms and what you were doing when the headache began  Record what you ate or drank for 24 hours before the headache started  Describe the pain and where it hurts  Keep track of what you did to treat your headache and if it worked  Prevent an acute headache:   · Avoid anything that triggers an acute headache  Examples include exposure to chemicals, going to high altitude, or not getting enough sleep  Create a regular sleep routine   Go to sleep at the same time and wake up at the same time each day  Do not use electronic devices before bedtime  These may trigger a headache or prevent you from sleeping well  · Do not smoke  Nicotine and other chemicals in cigarettes and cigars can trigger an acute headache or make it worse  Ask your healthcare provider for information if you currently smoke and need help to quit  E-cigarettes or smokeless tobacco still contain nicotine  Talk to your healthcare provider before you use these products  · Limit alcohol as directed  Alcohol can trigger an acute headache or make it worse  If you have cluster headaches, do not drink alcohol during an episode  For other types of headaches, ask your healthcare provider if it is safe for you to drink alcohol  Ask how much is safe for you to drink, and how often  · Exercise as directed  Exercise can reduce tension and help with headache pain  Aim for 30 minutes of physical activity on most days of the week  Your healthcare provider can help you create an exercise plan  · Eat a variety of healthy foods  Healthy foods include fruits, vegetables, low-fat dairy products, lean meats, fish, whole grains, and cooked beans  Your healthcare provider or dietitian can help you create meals plans if you need to avoid foods that trigger headaches  Follow up with your healthcare provider as directed:  Bring your headache record with you when you see your healthcare provider  Write down your questions so you remember to ask them during your visits  © 2017 [facility] Information is for End User's use only and may not be sold, redistributed or otherwise used for commercial purposes  All illustrations and images included in CareNotes® are the copyrighted property of A D A M [employer] Inc  or Daren Hunter  The above information is an  only  It is not intended as medical advice for individual conditions or treatments   Talk to your doctor, nurse or pharmacist before following any medical regimen to see if it is safe and effective for you

## 2018-06-27 ENCOUNTER — TELEPHONE (OUTPATIENT)
Dept: NEUROSURGERY | Facility: CLINIC | Age: 33
End: 2018-06-27

## 2018-06-27 DIAGNOSIS — G93.0 CEREBRAL CYSTS: Primary | ICD-10-CM

## 2018-06-27 NOTE — TELEPHONE ENCOUNTER
6/20/18 - Received a call from patient requesting to set up another appt  With DKO and also get a new script for the CT scan     6/22/18 - Called patient and LM     6/27/18 - Patient returned call and wants to arrange for the appt  And CT  CT head wo contrast script was placed and appt  Was made for 7/12/18 at 1:30 PM  Patient will sched  CT head

## 2018-07-06 ENCOUNTER — HOSPITAL ENCOUNTER (OUTPATIENT)
Dept: CT IMAGING | Facility: HOSPITAL | Age: 33
Discharge: HOME | End: 2018-07-06
Attending: NEUROLOGICAL SURGERY
Payer: COMMERCIAL

## 2018-07-06 DIAGNOSIS — G93.0 CEREBRAL CYSTS: ICD-10-CM

## 2018-07-06 PROCEDURE — 70450 CT HEAD/BRAIN W/O DYE: CPT

## 2018-07-12 ENCOUNTER — OFFICE VISIT (OUTPATIENT)
Dept: NEUROSURGERY | Facility: CLINIC | Age: 33
End: 2018-07-12
Payer: COMMERCIAL

## 2018-07-12 VITALS
SYSTOLIC BLOOD PRESSURE: 151 MMHG | TEMPERATURE: 98.3 F | HEIGHT: 67 IN | DIASTOLIC BLOOD PRESSURE: 87 MMHG | RESPIRATION RATE: 16 BRPM | WEIGHT: 255.3 LBS | HEART RATE: 96 BPM | BODY MASS INDEX: 40.07 KG/M2

## 2018-07-12 DIAGNOSIS — M54.12 RADICULOPATHY, CERVICAL: ICD-10-CM

## 2018-07-12 DIAGNOSIS — R51.9 CHRONIC INTRACTABLE HEADACHE, UNSPECIFIED HEADACHE TYPE: ICD-10-CM

## 2018-07-12 DIAGNOSIS — G89.29 CHRONIC INTRACTABLE HEADACHE, UNSPECIFIED HEADACHE TYPE: ICD-10-CM

## 2018-07-12 DIAGNOSIS — G93.0 ARACHNOID CYST: Primary | ICD-10-CM

## 2018-07-12 PROCEDURE — 99214 OFFICE O/P EST MOD 30 MIN: CPT | Performed by: NEUROLOGICAL SURGERY

## 2018-07-12 RX ORDER — GABAPENTIN 300 MG/1
CAPSULE ORAL
Refills: 0 | COMMUNITY
Start: 2018-06-30

## 2018-07-12 RX ORDER — OXYCODONE HYDROCHLORIDE 10 MG/1
10 TABLET ORAL EVERY 6 HOURS PRN
Refills: 0 | Status: ON HOLD | COMMUNITY
Start: 2018-06-04 | End: 2018-10-30

## 2018-07-12 RX ORDER — DIAZEPAM 10 MG/1
TABLET ORAL
Refills: 0 | COMMUNITY
Start: 2018-06-04

## 2018-07-12 NOTE — LETTER
July 12, 2018     Alen DO Erin  [address] [address]    Patient: Glenn Perez   YOB: 1985   Date of Visit: 7/12/2018       Dear Dr Mora Pel: Thank you for referring Glenn Perez to me for evaluation  Below are my notes for this consultation  If you have questions, please do not hesitate to call me  I look forward to following your patient along with you           Sincerely,        Pete Elizabeth MD        CC: No Recipients

## 2018-07-12 NOTE — LETTER
July 12, 2018     Tiff Aguilar DO  [address] [address]    Patient: Yovany Vivas   YOB: 1985   Date of Visit: 7/12/2018       Dear Dr Randi Thao: Thank you for referring Yovany Vivas to me for evaluation  Below are my notes for this consultation  If you have questions, please do not hesitate to call me  I look forward to following your patient along with you           Sincerely,        Shannon Goldsmith MD        CC: Jael Sutton DO

## 2018-07-12 NOTE — PROGRESS NOTES
Neurosurgery Office Note  Tyler Olvera is a 26 y o  female    Type of Visit: Follow-up      ASSESSMENT / Suri Croft was seen today for follow-up  Diagnoses and all orders for this visit:    Arachnoid cyst  -     MRI brain without contrast; Future    Chronic intractable headache, unspecified headache type  -     Ambulatory referral to Neurology; Future    Radiculopathy, cervical  -     MRI cervical spine without contrast; Future          DISCUSSION SUMMARY  This is a 70-year-old female who has a known arachnoid cyst   This was fenestrated and a cysto subarachnoid shunt was placed in March 2017  This is never went away completely over it became smaller  Over the last 6 months or so the patient has experience worsening right ear and headache pain  She gets headaches on a daily basis and consumes [facility] to self treat  She has a repeat MRI to assess the pressure affect on the surrounding tissues  The CT scan suggests that this is slightly smaller  Patient all complains of these chronic relapsing headaches which are right-sided and go away completely with cocktails which she receives at a local emergency departments  She has never had a neurology workup for this  I have recommended that she go to have a headache specialist for medication suggestions  Patient also complains of numbness tingling and pain radiating into the right arm  She has gone through physical therapy as well as epidural steroid injections and trigger point injections  She has failed all these conservative measures  An MRI of her cervical spine is not been performed  I have recommended that this be performed so that we can better understand treatment strategies and relationship of this to ease this cyst or some additional pathology in the cervical spine        CHIEF COMPLAINT  Patient presents for 1 year follow up with CT Head    DX: Arachnoid cyst    SX INFO  - 3/1/17 ([facility]) RIGHT IMAGE GUIDED RETROMASTOID CRANIOTOMY FOR FENESTRATION AND PLACEMENT OF INTERNAL CYSTO-SUBARACHNOID SHUNT    HISTORY OF PRESENT ILLNESS  I headaches are getting worse they occur on a daily basis  I wake up early and drink a can of about do and then go back to sleep  This makes so that the headache is at least tolerable during the day  The begin at the right temporal region and spread to the right retro-orbital region and right auricular region  Light and sound bothers her during these headaches  Patient also has complaints of right arm pain  Pain begins in the right shoulder and radiates down the arm  She develops numbness and tingling with these  This is becoming intractable and wakes her from her sleep at night  She has gone through physical therapy and epidural steroid injections which have been on helpful for her  She complains of neck stiffness especially on the right side  REVIEW OF SYSTEMS  Review of Systems   Constitutional: Negative  HENT: Positive for ear pain (right ear )  Eyes: Positive for pain (right eye )  Respiratory: Negative  Cardiovascular: Negative  Gastrointestinal: Positive for nausea (when headaches are really bad )  Endocrine: Negative  Genitourinary: Negative  Musculoskeletal: Positive for neck stiffness (on right side )  Negative for neck pain  Skin: Negative  Allergic/Immunologic: Negative  Neurological: Positive for headaches (right side )  Hematological: Negative  Psychiatric/Behavioral: Negative  All other systems reviewed and are negative        The patient's ROS was reviewed by MD     Active Ambulatory Problems     Diagnosis Date Noted    Cerebral cyst 2017     Resolved Ambulatory Problems     Diagnosis Date Noted    No Resolved Ambulatory Problems     Past Medical History:   Diagnosis Date    Anxiety     Arachnoid cyst     Chronic pain     Depression     Hypertension     Migraine        Past Surgical History:   Procedure Laterality Date     SECTION  CHOLECYSTECTOMY      CRANIOTOMY Right 3/1/2017    Procedure: RIGHT IMAGE GUIDED RETROMASTOID CRANIOTOMY FOR FENESTRATION AND PLACEMENT OF INTERNAL CYSTO-SUBARACHNOID SHUNT;  Surgeon: Jennifer Rowe MD;  [facility];  Service:    [facility] MOUTH SURGERY      WISDOM TOOTH EXTRACTION         History   Smoking Status    Current Every Day Smoker    Packs/day: 0 50   Smokeless Tobacco    Never Used       History   Alcohol Use No       History   Drug Use No       Vitals:    07/12/18 1325   BP: 151/87   BP Location: Left arm   Patient Position: Sitting   Cuff Size: Standard   Pulse: 96   Resp: 16   Temp: 98 3 °F (36 8 °C)   TempSrc: Tympanic   Weight: 116 kg (255 lb 4 8 oz)   Height: 5' 7" (1 702 m)         Current Outpatient Prescriptions:     butalbital-acetaminophen-caffeine (FIORICET,ESGIC) -40 mg per tablet, Take 1 tablet by mouth every 4 (four) hours as needed for headaches, Disp: , Rfl:     diazepam (VALIUM) 10 mg tablet, TAKE 1-2 TABLETS BY MOUTH 1-2 HOURS BEFORE PROCEDURE, Disp: , Rfl: 0    gabapentin (NEURONTIN) 300 mg capsule, TAKE 1 CAPSULE BY MOUTH 3 TO 4 TIMES A DAY, Disp: , Rfl: 0    oxyCODONE (ROXICODONE) 10 MG TABS, Take 10 mg by mouth every 6 (six) hours as needed, Disp: , Rfl: 0    The following portions of the patient's history were reviewed by MD and updated by MA as appropriate: allergies, current medications, past family history, past medical history, past social history, past surgical history and problem list       Physical Exam  Awake alert and oriented  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  Her 3rd nerve medially deviates appropriately  She denies diplopia during these maneuvers  Her facial sensation is decreased to fine touch in the V1, V2, and V3 distributions  To pinprick her V2 distribution is affected to a great extent  There is a 50% reduction in the sensation of pinprick in the right V1 distribution    Her facial expression is intact in grimace and at rest   She has no drift  Her power is 5/5 bilaterally except in the right upper extremity  In the right upper extremity there is a 4/5 paresis of the entire are musculature  There is reduction in sensation to fine touch and pinprick in the right upper extremity  The lower extremities and the left upper extremity are intact  The deep tendon reflexes are diminished on the right in comparison to the left  RESULTS/DATA  CT scans of the brain from 2000 16 are compared with current studies  Please find these below  There is an arachnoid cyst with compression of the overlying cerebellum which has diminished in size  The shunt can be seen in the center of this  There continues to be distortion of the 4th ventricle

## 2018-07-13 ENCOUNTER — TELEPHONE (OUTPATIENT)
Dept: NEUROLOGY | Facility: CLINIC | Age: 33
End: 2018-07-13

## 2018-07-15 ENCOUNTER — HOSPITAL ENCOUNTER (EMERGENCY)
Facility: HOSPITAL | Age: 33
Discharge: HOME | End: 2018-07-15
Payer: COMMERCIAL

## 2018-07-15 VITALS
HEART RATE: 106 BPM | SYSTOLIC BLOOD PRESSURE: 133 MMHG | RESPIRATION RATE: 18 BRPM | WEIGHT: 255.29 LBS | DIASTOLIC BLOOD PRESSURE: 89 MMHG | BODY MASS INDEX: 39.98 KG/M2 | TEMPERATURE: 98.8 F | OXYGEN SATURATION: 97 %

## 2018-07-15 DIAGNOSIS — H00.019 HORDEOLUM: Primary | ICD-10-CM

## 2018-07-15 PROCEDURE — 99283 EMERGENCY DEPT VISIT LOW MDM: CPT

## 2018-07-15 RX ORDER — ERYTHROMYCIN 5 MG/G
0.5 OINTMENT OPHTHALMIC EVERY 6 HOURS SCHEDULED
Qty: 28 G | Refills: 0 | Status: SHIPPED | OUTPATIENT
Start: 2018-07-15 | End: 2018-07-22

## 2018-07-15 RX ADMIN — FLUORESCEIN SODIUM AND PROPARACAINE HYDROCHLORIDE 1 DROP: 2.5; 5 SOLUTION/ DROPS OPHTHALMIC at 05:00

## 2018-07-15 NOTE — DISCHARGE INSTRUCTIONS
Stye   WHAT YOU NEED TO KNOW:   A stye is a lump on the edge or inside of your eyelid caused by an infection  A stye can form on your upper or lower eyelid  It usually goes away in 2 to 4 days  DISCHARGE INSTRUCTIONS:   Medicines:   · Antibiotic medicine: This is given as an ointment to put into your eye  It is used to fight an infection caused by bacteria  Use as directed  · Take your medicine as directed  Contact your healthcare provider if you think your medicine is not helping or if you have side effects  Tell him of her if you are allergic to any medicine  Keep a list of the medicines, vitamins, and herbs you take  Include the amounts, and when and why you take them  Bring the list or the pill bottles to follow-up visits  Carry your medicine list with you in case of an emergency  Follow up with your healthcare provider as directed:  Write down your questions so you remember to ask them during your visits  Self-care:   · Use warm compresses: This will help decrease swelling and pain  Wet a clean washcloth with warm water and place it on your eye for 10 to 15 minutes, 3 to 4 times each day or as directed  · Keep your hands away from your eye: This helps to prevent the spread of the infection to other parts of the eye  Wash your hands often with soap and dry with a clean towel  Do not squeeze the stye  · Do not use eye makeup:  Do not wear eye makeup while you have a stye  Eye makeup may carry bacteria and cause another stye  Throw away eye makeup and brushes used to apply the makeup  Use new eye makeup after the stye has gone away  Do not share eye makeup with others  · Prevent another stye:  Wash your face and clean your eyelashes every day  Remove eye makeup with makeup remover  This helps to completely remove eye makeup without heavy rubbing  Contact your healthcare provider if:   · You have redness and discharge around your eye, and your eye pain is getting worse      · Your vision changes  · The stye has not gone away within 7 days  · The stye comes back within a short period of time after treatment  · You have questions or concerns about your condition or care  © 2017 [address] [employer] Information is for End User's use only and may not be sold, redistributed or otherwise used for commercial purposes  All illustrations and images included in CareNotes® are the copyrighted property of [employer]  or Daren Hunter  The above information is an  only  It is not intended as medical advice for individual conditions or treatments  Talk to your doctor, nurse or pharmacist before following any medical regimen to see if it is safe and effective for you

## 2018-07-15 NOTE — ED PROVIDER NOTES
History  Chief Complaint   Patient presents with    Eye Problem     pt c/o pain , swelling and purulent drainage from right eye X 3 days     HPI     Patient is a pleasant 68-year-old female who reports to the emergency department with pain in her right eyelid, there is some redness and swelling  No periorbital cellulitis  No proptosis  No pain with extraocular movement  She notes that the eye has had some discharge for the past 3 days  This appears to be a hordeolum  No foreign body upon lid eversion  No visual changes  No vomiting or diarrhea  No headache  No redness to the sclera  Floor seen evaluation shows no corneal abrasion  Medical decision makin-year-old female, hordeolum, she also notes that she has this discharge, will treat for conjunctivitis with topical antibiotics although I suspect that this is mostly just the hordeolum causing a small amount of discharge  The patient (and any family present) verbalized understanding of the discharge instructions and warnings that would necessitate return to the Emergency Department  Gave verbal in addition to written discharge instructions  Specifically highlighted areas of special concern regarding the written and verbal discharge instructions and return precautions  All questions were answered prior to discharge  Prior to Admission Medications   Prescriptions Last Dose Informant Patient Reported? Taking?    butalbital-acetaminophen-caffeine (FIORICET,ESGIC) -40 mg per tablet   Yes No   Sig: Take 1 tablet by mouth every 4 (four) hours as needed for headaches   diazepam (VALIUM) 10 mg tablet   Yes No   Sig: TAKE 1-2 TABLETS BY MOUTH 1-2 HOURS BEFORE PROCEDURE   gabapentin (NEURONTIN) 300 mg capsule   Yes No   Sig: TAKE 1 CAPSULE BY MOUTH 3 TO 4 TIMES A DAY   oxyCODONE (ROXICODONE) 10 MG TABS   Yes No   Sig: Take 10 mg by mouth every 6 (six) hours as needed      Facility-Administered Medications: None       Past Medical History:   Diagnosis Date    Anxiety     Arachnoid cyst     Chronic pain     back    Depression     Hypertension     Migraine        Past Surgical History:   Procedure Laterality Date     SECTION      CHOLECYSTECTOMY      CRANIOTOMY Right 3/1/2017    Procedure: RIGHT IMAGE GUIDED RETROMASTOID CRANIOTOMY FOR FENESTRATION AND PLACEMENT OF INTERNAL CYSTO-SUBARACHNOID SHUNT;  Surgeon: Sherly Cardona MD;  [facility];  Service:    [facility] MOUTH SURGERY      WISDOM TOOTH EXTRACTION         History reviewed  No pertinent family history  I have reviewed and agree with the history as documented  Social History   Substance Use Topics    Smoking status: Current Every Day Smoker     Packs/day: 0 50    Smokeless tobacco: Never Used    Alcohol use No        Review of Systems   HENT: Negative for congestion and facial swelling  Eyes: Positive for pain and discharge  Negative for photophobia, redness and visual disturbance  All other systems reviewed and are negative  Physical Exam  Physical Exam   Constitutional: She is oriented to person, place, and time  She appears well-developed and well-nourished  HENT:   Head: Normocephalic and atraumatic  Right Ear: External ear normal    Left Ear: External ear normal    Eyes: Conjunctivae and EOM are normal    Neck: Normal range of motion  Neck supple  No JVD present  No tracheal deviation present  Cardiovascular: Normal rate, regular rhythm and normal heart sounds  Pulmonary/Chest: Effort normal  No respiratory distress  She has no wheezes  She has no rales  Abdominal: Soft  Bowel sounds are normal  There is no tenderness  There is no rebound and no guarding  Musculoskeletal: She exhibits no edema or tenderness  Neurological: She is alert and oriented to person, place, and time  Skin: Skin is warm and dry  No rash noted  No erythema  Psychiatric: She has a normal mood and affect   Thought content normal    Nursing note and vitals reviewed  Vital Signs  ED Triage Vitals [07/15/18 0444]   Temperature Pulse Respirations Blood Pressure SpO2   98 8 °F (37 1 °C) (!) 106 18 133/89 97 %      Temp Source Heart Rate Source Patient Position - Orthostatic VS BP Location FiO2 (%)   Temporal Monitor Sitting Right arm --      Pain Score       --           Vitals:    07/15/18 0444   BP: 133/89   Pulse: (!) 106   Patient Position - Orthostatic VS: Sitting       Visual Acuity  Visual Acuity      Most Recent Value   Visual acuity R eye is  20/25   Visual acuity Left eye is  20/30   Visual acuity in both eyes is  20/25          ED Medications  Medications   Fluorescein-Proparacaine (FLUCAINE) 0 25-0 5 % ophthalmic solution 1 drop (not administered)       Diagnostic Studies  Results Reviewed     None                 No orders to display              Procedures  Procedures       Phone Contacts  ED Phone Contact    ED Course                               MDM  CritCare Time    Disposition  Final diagnoses:   Hordeolum     Time reflects when diagnosis was documented in both MDM as applicable and the Disposition within this note     Time User Action Codes Description Comment    7/15/2018  5:04 AM [place], [address] [[id number]] Hordeolum       ED Disposition     ED Disposition Condition Comment    Discharge  Mildred Dhiraj discharge to home/self care  Condition at discharge: Good        Follow-up Information     Follow up With Specialties Details Why Contact Ko Baird DO Family Medicine In 1 day  [address] [address]  [phone]            Patient's Medications   Discharge Prescriptions    ERYTHROMYCIN (ROMYCIN) OPHTHALMIC OINTMENT    Administer 0 5 inches to the right eye every 6 (six) hours for 7 days       Start Date: 7/15/2018 End Date: 7/22/2018       Order Dose: 0 5 inches       Quantity: 28 g    Refills: 0     No discharge procedures on file      ED Provider  Electronically Signed by           Jae Carrol Brown MD  07/15/18 7388

## 2018-07-26 ENCOUNTER — HOSPITAL ENCOUNTER (OUTPATIENT)
Dept: MRI IMAGING | Facility: HOSPITAL | Age: 33
Discharge: HOME | End: 2018-07-26
Attending: NEUROLOGICAL SURGERY
Payer: COMMERCIAL

## 2018-07-26 DIAGNOSIS — M54.12 RADICULOPATHY, CERVICAL: ICD-10-CM

## 2018-07-26 DIAGNOSIS — G93.0 ARACHNOID CYST: ICD-10-CM

## 2018-07-26 PROCEDURE — 72141 MRI NECK SPINE W/O DYE: CPT

## 2018-07-26 PROCEDURE — 70551 MRI BRAIN STEM W/O DYE: CPT

## 2018-08-07 ENCOUNTER — OFFICE VISIT (OUTPATIENT)
Dept: NEUROSURGERY | Facility: CLINIC | Age: 33
End: 2018-08-07
Payer: COMMERCIAL

## 2018-08-07 VITALS
HEIGHT: 67 IN | TEMPERATURE: 98.1 F | SYSTOLIC BLOOD PRESSURE: 135 MMHG | HEART RATE: 95 BPM | BODY MASS INDEX: 40.65 KG/M2 | WEIGHT: 259 LBS | DIASTOLIC BLOOD PRESSURE: 90 MMHG

## 2018-08-07 DIAGNOSIS — M47.22 OSTEOARTHRITIS OF SPINE WITH RADICULOPATHY, CERVICAL REGION: Primary | ICD-10-CM

## 2018-08-07 DIAGNOSIS — G93.0 CEREBRAL CYST: ICD-10-CM

## 2018-08-07 PROCEDURE — 99213 OFFICE O/P EST LOW 20 MIN: CPT | Performed by: NEUROLOGICAL SURGERY

## 2018-08-07 NOTE — LETTER
August 9, 2018     Sanjiv Felix DO  [address] [address]    Patient: Laurence Boo   YOB: 1985   Date of Visit: 8/7/2018       Dear Dr Robert Butterfieldr: Thank you for referring Laurence Boo to me for evaluation  Below are my notes for this consultation  If you have questions, please do not hesitate to call me  I look forward to following your patient along with you  Sincerely,        Sarah Veliz MD        CC: No Recipients  Sarah Veliz MD  8/9/2018  9:27 AM  Sign at close encounter  Neurosurgery Office Note  Laurence Boo is a 26 y o  female    Type of Visit: Follow-up (Arachnoid Cyst )      ASSESSMENT / Gennaro Robert was seen today for follow-up  Diagnoses and all orders for this visit:    Osteoarthritis of spine with radiculopathy, cervical region  -     Ambulatory referral to Physical Therapy; Future  -     Ambulatory referral to Pain Management; Future  -     EMG 1 Limb; Future    Cerebral cyst  -     Ambulatory referral to Physical Therapy; Future          DISCUSSION SUMMARY  This is a 17-year-old female who complains of weakness of the right arm with pain and some balance difficulties  Imaging studies of the cervical spine demonstrates some cervical spondylosis an mild spinal stenosis with neural foraminal narrowing  For this I have recommended conservative therapy 1st including physical therapy and epidural steroid injections  If these are ineffective then an EMG nerve conduction study would be indicated followed by return visit  Her cerebral cyst as previously described smaller than it had been and I believe is asymptomatic at this point        CHIEF COMPLAINT  Patient presents for 1 month f/u CT head & MRI Brain     Right arm pain and weakness    DX: Arachnoid cyst    SX INFO  - 3/1/17 (DKO) RIGHT IMAGE GUIDED RETROMASTOID CRANIOTOMY FOR FENESTRATION AND PLACEMENT OF INTERNAL CYSTO-SUBARACHNOID SHUNT    HISTORY OF PRESENT ILLNESS  Neck pain with some balance difficulties and right arm weakness these have been progressing over time slowly  She has not fallen  She does have some difficulty in navigating steps  Her bowel bladder are not affected by this  REVIEW OF SYSTEMS  Review of Systems   Constitutional: Negative  HENT: Negative  Eyes: Negative  Respiratory: Negative  Cardiovascular: Negative  Gastrointestinal: Negative  Endocrine: Negative  Genitourinary: Negative  Musculoskeletal: Positive for gait problem (imbalance)  Negative for arthralgias, back pain, joint swelling, myalgias, neck pain and neck stiffness  Skin: Negative  Allergic/Immunologic: Negative  Neurological: Positive for dizziness, numbness (right side of neck into fingers) and headaches  Negative for tremors, seizures, syncope, facial asymmetry, speech difficulty, weakness and light-headedness  Hematological: Negative  Psychiatric/Behavioral: Negative  The patient's ROS was reviewed by MD   I reviewed the ROS      Active Ambulatory Problems     Diagnosis Date Noted    Cerebral cyst 2017    Osteoarthritis of spine with radiculopathy, cervical region 2018     Resolved Ambulatory Problems     Diagnosis Date Noted    No Resolved Ambulatory Problems     Past Medical History:   Diagnosis Date    Anxiety     Arachnoid cyst     Chronic pain     Depression     Hypertension     Migraine        Past Surgical History:   Procedure Laterality Date     SECTION      CHOLECYSTECTOMY      CRANIOTOMY Right 3/1/2017    Procedure: RIGHT IMAGE GUIDED RETROMASTOID CRANIOTOMY FOR FENESTRATION AND PLACEMENT OF INTERNAL CYSTO-SUBARACHNOID SHUNT;  Surgeon: Maureen Renner MD;  [facility];  Service:    [facility] MOUTH SURGERY      WISDOM TOOTH EXTRACTION         History   Smoking Status    Current Every Day Smoker    Packs/day: 0 50   Smokeless Tobacco    Never Used       History   Alcohol Use No History   Drug Use No       Vitals:    08/07/18 1123   BP: 135/90   BP Location: Right arm   Patient Position: Sitting   Cuff Size: Standard   Pulse: 95   Temp: 98 1 °F (36 7 °C)   TempSrc: Tympanic   Weight: 117 kg (259 lb)   Height: 5' 7" (1 702 m)         Current Outpatient Prescriptions:     butalbital-acetaminophen-caffeine (FIORICET,ESGIC) -40 mg per tablet, Take 1 tablet by mouth every 4 (four) hours as needed for headaches, Disp: , Rfl:     diazepam (VALIUM) 10 mg tablet, TAKE 1-2 TABLETS BY MOUTH 1-2 HOURS BEFORE PROCEDURE, Disp: , Rfl: 0    gabapentin (NEURONTIN) 300 mg capsule, TAKE 1 CAPSULE BY MOUTH 3 TO 4 TIMES A DAY, Disp: , Rfl: 0    oxyCODONE (ROXICODONE) 10 MG TABS, Take 10 mg by mouth every 6 (six) hours as needed, Disp: , Rfl: 0    The following portions of the patient's history were reviewed by MD and updated by MA as appropriate: allergies, current medications, past family history, past medical history, past social history, past surgical history and problem list       Physical Exam  Awake alert and oriented  Extraocular movements are intact  Pupils are equal round and reactive to light and accommodate  Facial sensation is intact bilaterally  Facial expression is intact in grimace and at rest  Station and gait are near normal   Heel to toe demonstrates postural balance difficulties  The right upper extremity demonstrates some decreased sensation in the median versus C6 distribution which is difficult to sort out clinically  There is no wasting of the thenar eminence    RESULTS/DATA  CT scan of the brain is compared with previous MRIs  This demonstrates the retromastoid cyst to be smaller than it had been previously and is relatively stable since the previous exam     An MRI of the cervical spine demonstrates reversal of the normal lordosis  There is mild to moderate spinal stenosis with a disc herniation and neural foraminal narrowing    She has a congenitally small canal

## 2018-08-07 NOTE — PROGRESS NOTES
Neurosurgery Office Note  Zandra Bowers is a 26 y o  female    Type of Visit: Follow-up (Arachnoid Cyst )      ASSESSMENT / Leora Lisa was seen today for follow-up  Diagnoses and all orders for this visit:    Osteoarthritis of spine with radiculopathy, cervical region  -     Ambulatory referral to Physical Therapy; Future  -     Ambulatory referral to Pain Management; Future  -     EMG 1 Limb; Future    Cerebral cyst  -     Ambulatory referral to Physical Therapy; Future          DISCUSSION SUMMARY  This is a 75-year-old female who complains of weakness of the right arm with pain and some balance difficulties  Imaging studies of the cervical spine demonstrates some cervical spondylosis an mild spinal stenosis with neural foraminal narrowing  For this I have recommended conservative therapy 1st including physical therapy and epidural steroid injections  If these are ineffective then an EMG nerve conduction study would be indicated followed by return visit  Her cerebral cyst as previously described smaller than it had been and I believe is asymptomatic at this point  CHIEF COMPLAINT  Patient presents for 1 month f/u CT head & MRI Brain     Right arm pain and weakness    DX: Arachnoid cyst    SX INFO  - 3/1/17 (DKO) RIGHT IMAGE GUIDED RETROMASTOID CRANIOTOMY FOR FENESTRATION AND PLACEMENT OF INTERNAL CYSTO-SUBARACHNOID SHUNT    HISTORY OF PRESENT ILLNESS  Neck pain with some balance difficulties and right arm weakness these have been progressing over time slowly  She has not fallen  She does have some difficulty in navigating steps  Her bowel bladder are not affected by this  REVIEW OF SYSTEMS  Review of Systems   Constitutional: Negative  HENT: Negative  Eyes: Negative  Respiratory: Negative  Cardiovascular: Negative  Gastrointestinal: Negative  Endocrine: Negative  Genitourinary: Negative  Musculoskeletal: Positive for gait problem (imbalance)   Negative for arthralgias, back pain, joint swelling, myalgias, neck pain and neck stiffness  Skin: Negative  Allergic/Immunologic: Negative  Neurological: Positive for dizziness, numbness (right side of neck into fingers) and headaches  Negative for tremors, seizures, syncope, facial asymmetry, speech difficulty, weakness and light-headedness  Hematological: Negative  Psychiatric/Behavioral: Negative  The patient's ROS was reviewed by MD   I reviewed the ROS      Active Ambulatory Problems     Diagnosis Date Noted    Cerebral cyst 2017    Osteoarthritis of spine with radiculopathy, cervical region 2018     Resolved Ambulatory Problems     Diagnosis Date Noted    No Resolved Ambulatory Problems     Past Medical History:   Diagnosis Date    Anxiety     Arachnoid cyst     Chronic pain     Depression     Hypertension     Migraine        Past Surgical History:   Procedure Laterality Date     SECTION      CHOLECYSTECTOMY      CRANIOTOMY Right 3/1/2017    Procedure: RIGHT IMAGE GUIDED RETROMASTOID CRANIOTOMY FOR FENESTRATION AND PLACEMENT OF INTERNAL CYSTO-SUBARACHNOID SHUNT;  Surgeon: Sandor Alas MD;  [facility];  Service:    [facility] SURGERY      WISDOM TOOTH EXTRACTION         History   Smoking Status    Current Every Day Smoker    Packs/day: 0 50   Smokeless Tobacco    Never Used       History   Alcohol Use No       History   Drug Use No       Vitals:    18 1123   BP: 135/90   BP Location: Right arm   Patient Position: Sitting   Cuff Size: Standard   Pulse: 95   Temp: 98 1 °F (36 7 °C)   TempSrc: Tympanic   Weight: 117 kg (259 lb)   Height: 5' 7" (1 702 m)         Current Outpatient Prescriptions:     butalbital-acetaminophen-caffeine (FIORICET,ESGIC) -40 mg per tablet, Take 1 tablet by mouth every 4 (four) hours as needed for headaches, Disp: , Rfl:     diazepam (VALIUM) 10 mg tablet, TAKE 1-2 TABLETS BY MOUTH 1-2 HOURS BEFORE PROCEDURE, Disp: , Rfl: 0    gabapentin (NEURONTIN) 300 mg capsule, TAKE 1 CAPSULE BY MOUTH 3 TO 4 TIMES A DAY, Disp: , Rfl: 0    oxyCODONE (ROXICODONE) 10 MG TABS, Take 10 mg by mouth every 6 (six) hours as needed, Disp: , Rfl: 0    The following portions of the patient's history were reviewed by MD and updated by MA as appropriate: allergies, current medications, past family history, past medical history, past social history, past surgical history and problem list       Physical Exam  Awake alert and oriented  Extraocular movements are intact  Pupils are equal round and reactive to light and accommodate  Facial sensation is intact bilaterally  Facial expression is intact in grimace and at rest  Station and gait are near normal   Heel to toe demonstrates postural balance difficulties  The right upper extremity demonstrates some decreased sensation in the median versus C6 distribution which is difficult to sort out clinically  There is no wasting of the thenar eminence    RESULTS/DATA  CT scan of the brain is compared with previous MRIs  This demonstrates the retromastoid cyst to be smaller than it had been previously and is relatively stable since the previous exam     An MRI of the cervical spine demonstrates reversal of the normal lordosis  There is mild to moderate spinal stenosis with a disc herniation and neural foraminal narrowing    She has a congenitally small canal

## 2018-08-14 NOTE — PROGRESS NOTES
PT Evaluation     Today's date: 2018  Patient name: Laurence Boo  : 1985  MRN: [id number]  Referring provider: Kike Rueda MD  Dx:   Encounter Diagnosis     ICD-10-CM    1  Osteoarthritis of spine with radiculopathy, cervical region M47 22 Ambulatory referral to Physical Therapy   2  Cerebral cyst G93 0 Ambulatory referral to Physical Therapy                  Assessment  Impairments: abnormal or restricted ROM, activity intolerance, impaired physical strength, lacks appropriate home exercise program, pain with function, poor posture  and poor body mechanics  Other impairment: decreased flexibility  Functional limitations: difficulty sleeping  Assessment details: The patient is a 25 y/o female who presents to PT with diagnosis of OA of spine with radiculopathy, cervical region  She has complaints of constant pain in her neck which can also radiate into her R arm  She also has complaints of tingling into her R hand  She also demonstrates deficits with decreased C/S and BUE ROM and strength, decreased postural awareness, difficulty sleeping, poor body mechanics and pain and difficulty with completing her ADLs  She remains I with all her ADLs though she does have pain with completing them  Most pain noted with those that require lifting, reaching OH our out to the side  She also notes difficulty with sleeping and pain can wake her up overnight  TTP is noted t/o her R shoulder and UT/rhomboid region  Decreased posture and weakness are possible contributing factors to pain  She is not working currently but will be starting a new job next week  She also notes weakness in her R hand and has difficulty with certain tasks such as opening a can or bottle  The patient would benefit from continued PT to address deficits and improve function    Tx to include ROM, stretching, strengthening, modalities, HEP, pt education, postural ed, lifting/body mechanics, neuro re-ed, balance/proprioception Te, MT and equipment  Understanding of Dx/Px/POC: good   Prognosis: fair    Goals  STGs:  1  Initiate and complete HEP with verbal cues  2   Improve C/S ROM by 5-10 degrees in 4 weeks  3   Improve RUE strength by 1/2 grade in 4 weeks  4   Decrease C/S pain by 25% in 4 weeks  LTGs:  1  Patient to be I with HEP in 6 weeks  2   Improve C/S ROM to WNL t/o in 8 weeks to improve function  3   Decrease neck pain to < or = to 2-3/10 with activity in 8 weeks to improve function  4   Improve BUE strength to > 4/5 t/o in 8 weeks to improve function  5   Postural control is improved to maximal level of function in 8 weeks  6   Recreational performance is improved to PLOF in 8 weeks  7   Work performance is improved to maximal level of function in 8 weeks  8   ADL performance is improved to PLOF in 8 weeks  9   Patient to report improved sleep in 8 weeks  Plan  Patient would benefit from: skilled physical therapy  Planned modality interventions: cryotherapy, thermotherapy: hydrocollator packs and unattended electrical stimulation  Planned therapy interventions: manual therapy, body mechanics training, neuromuscular re-education, patient education, postural training, self care, strengthening, stretching, therapeutic activities, therapeutic exercise, flexibility and home exercise program  Frequency: 2x week  Duration in weeks: 6  Plan of Care beginning date: 8/16/2018  Plan of Care expiration date: 9/16/2018  Treatment plan discussed with: patient        Subjective Evaluation    History of Present Illness  Mechanism of injury: The patient states that she had a craniotomy and shunt put in place in March of 2017  Since surgery she has had neck pain and then about seven months ago she developed numbness and tingling into the R arm/hand  Dr Mackenzie Masters had done the surgery and she had her most recent appointment with him last week  At that appointment she had told him about the pain and tingling    She was referred to OPPT, also pain management, and she will be having an EMG in November  She will be following up with the doctor as needed  Quality of life: good    Pain  Current pain ratin  At best pain rating: 3  At worst pain rating: 10  Location: Neck R > L side, R shoulder and into R arm  Tingling into R arm to hand  Quality: dull ache, sharp, discomfort and radiating  Relieving factors: medications and heat  Aggravating factors: lifting and overhead activity    Social Support  Lives with: spouse and young children    Employment status: working ([employer],  at [employer])  Hand dominance: right    Patient Goals  Patient goals for therapy: decreased pain, increased motion and increased strength  Patient goal: "To decrease the pain in my neck, to get my muscles to relax "        Objective     Static Posture     Head  Forward  Shoulders  Rounded  Postural Observations  Seated posture: fair  Standing posture: fair  Correction of posture: has no consistent effect        Palpation     Right   Muscle spasm in the upper trapezius  Tenderness of the levator scapulae, middle trapezius, rhomboids and upper trapezius       Neurological Testing     Sensation   Cervical/Thoracic   Left   Intact: light touch    Right   Intact: light touch    Active Range of Motion   Cervical/Thoracic Spine   Cervical    Flexion: 30 degrees with pain  Extension: 25 degrees with pain  Left lateral flexion: 30 degrees with pain  Right lateral flexion: 30 degrees with pain  Left rotation: 55 degrees with pain  Right rotation: 65 degrees with pain  Left Shoulder   Normal active range of motion    Right Shoulder   Normal active range of motion    Left Elbow   Normal active range of motion    Right Elbow   Normal active range of motion    Strength/Myotome Testing     Left Shoulder     Planes of Motion   Flexion: 4+   Abduction: 4+   External rotation at 90°: 5   Internal rotation at 90°: 5     Right Shoulder     Planes of Motion   Flexion: 4-   Abduction: 3+   External rotation at 90°: 4-   Internal rotation at 90°: 4-       Flowsheet Rows      Most Recent Value   PT/OT G-Codes   Current Score  63   FOTO information reviewed  Yes   Assessment Type  Evaluation   G code set  Other PT/OT Primary   Other PT Primary Current Status ()  CJ   Other PT Primary Goal Status ()  CJ          Precautions: None    Re-Eval DUE: 9/16/18    Specialty Daily Treatment Diary     Manual  8/16/18                                           Exercise Diary         UBE - Retro        C/S ROM - all planes        UT Stretch        Levator Stretch        Shrugs/Rolls/ Retractions        Ball Roll on Wall - all dir        Wall Pushups        Stand - FF and Abd        MTP/LTP        S/L ER and Abd        Prone - T & V's        Prone - 90/90 Scap Stab        Prone - flex/rows/horiz abd/ext        C/S Isometrics - all planes        Pulleys - flex/abd        Supine Serratus Punches                                            Modalities 8/16/18       HP R shoulder  10 minutes                         Issued and reviewed HEP with patient for C/S ROM all planes, shoulder shrugs, upper trap stretch and levator stretch  She verbalized understanding  Also spoke with patient about posture and body mechanics

## 2018-08-16 ENCOUNTER — EVALUATION (OUTPATIENT)
Dept: PHYSICAL THERAPY | Facility: HOME HEALTHCARE | Age: 33
End: 2018-08-16
Payer: COMMERCIAL

## 2018-08-16 DIAGNOSIS — G93.0 CEREBRAL CYST: ICD-10-CM

## 2018-08-16 DIAGNOSIS — M47.22 OSTEOARTHRITIS OF SPINE WITH RADICULOPATHY, CERVICAL REGION: Primary | ICD-10-CM

## 2018-08-16 PROCEDURE — G8991 OTHER PT/OT GOAL STATUS: HCPCS | Performed by: PHYSICAL THERAPIST

## 2018-08-16 PROCEDURE — G8990 OTHER PT/OT CURRENT STATUS: HCPCS | Performed by: PHYSICAL THERAPIST

## 2018-08-16 PROCEDURE — 97535 SELF CARE MNGMENT TRAINING: CPT | Performed by: PHYSICAL THERAPIST

## 2018-08-16 PROCEDURE — 97162 PT EVAL MOD COMPLEX 30 MIN: CPT | Performed by: PHYSICAL THERAPIST

## 2018-10-16 ENCOUNTER — TELEPHONE (OUTPATIENT)
Dept: NEUROLOGY | Facility: CLINIC | Age: 33
End: 2018-10-16

## 2018-10-24 ENCOUNTER — HOSPITAL ENCOUNTER (EMERGENCY)
Facility: HOSPITAL | Age: 33
Discharge: HOME | End: 2018-10-24
Attending: EMERGENCY MEDICINE | Admitting: EMERGENCY MEDICINE
Payer: COMMERCIAL

## 2018-10-24 ENCOUNTER — APPOINTMENT (EMERGENCY)
Dept: CT IMAGING | Facility: HOSPITAL | Age: 33
End: 2018-10-24
Payer: COMMERCIAL

## 2018-10-24 VITALS
HEART RATE: 92 BPM | DIASTOLIC BLOOD PRESSURE: 62 MMHG | SYSTOLIC BLOOD PRESSURE: 146 MMHG | RESPIRATION RATE: 16 BRPM | TEMPERATURE: 99.1 F | OXYGEN SATURATION: 99 % | BODY MASS INDEX: 39.33 KG/M2 | WEIGHT: 251.1 LBS

## 2018-10-24 DIAGNOSIS — R10.9 BILATERAL FLANK PAIN: Primary | ICD-10-CM

## 2018-10-24 DIAGNOSIS — R10.30 LOWER ABDOMINAL PAIN: ICD-10-CM

## 2018-10-24 LAB
ALBUMIN SERPL BCP-MCNC: 3.6 G/DL (ref 3.5–5)
ALP SERPL-CCNC: 93 U/L (ref 46–116)
ALT SERPL W P-5'-P-CCNC: 30 U/L (ref 12–78)
ANION GAP SERPL CALCULATED.3IONS-SCNC: 11 MMOL/L (ref 4–13)
AST SERPL W P-5'-P-CCNC: 16 U/L (ref 5–45)
BASOPHILS # BLD AUTO: 0.07 THOUSANDS/ΜL (ref 0–0.1)
BASOPHILS NFR BLD AUTO: 1 % (ref 0–1)
BILIRUB SERPL-MCNC: 0.2 MG/DL (ref 0.2–1)
BILIRUB UR QL STRIP: NEGATIVE
BUN SERPL-MCNC: 8 MG/DL (ref 5–25)
CALCIUM SERPL-MCNC: 8.9 MG/DL (ref 8.3–10.1)
CHLORIDE SERPL-SCNC: 102 MMOL/L (ref 100–108)
CLARITY UR: CLEAR
CO2 SERPL-SCNC: 25 MMOL/L (ref 21–32)
COLOR UR: YELLOW
CREAT SERPL-MCNC: 0.75 MG/DL (ref 0.6–1.3)
EOSINOPHIL # BLD AUTO: 0.14 THOUSAND/ΜL (ref 0–0.61)
EOSINOPHIL NFR BLD AUTO: 1 % (ref 0–6)
ERYTHROCYTE [DISTWIDTH] IN BLOOD BY AUTOMATED COUNT: 12.2 % (ref 11.6–15.1)
EXT PREG TEST URINE: NEGATIVE
GFR SERPL CREATININE-BSD FRML MDRD: 104 ML/MIN/1.73SQ M
GLUCOSE SERPL-MCNC: 131 MG/DL (ref 65–140)
GLUCOSE UR STRIP-MCNC: NEGATIVE MG/DL
HCT VFR BLD AUTO: 40.3 % (ref 34.8–46.1)
HGB BLD-MCNC: 13.5 G/DL (ref 11.5–15.4)
HGB UR QL STRIP.AUTO: NEGATIVE
IMM GRANULOCYTES # BLD AUTO: 0.06 THOUSAND/UL (ref 0–0.2)
IMM GRANULOCYTES NFR BLD AUTO: 0 % (ref 0–2)
KETONES UR STRIP-MCNC: NEGATIVE MG/DL
LEUKOCYTE ESTERASE UR QL STRIP: NEGATIVE
LYMPHOCYTES # BLD AUTO: 3.79 THOUSANDS/ΜL (ref 0.6–4.47)
LYMPHOCYTES NFR BLD AUTO: 28 % (ref 14–44)
MCH RBC QN AUTO: 28.4 PG (ref 26.8–34.3)
MCHC RBC AUTO-ENTMCNC: 33.5 G/DL (ref 31.4–37.4)
MCV RBC AUTO: 85 FL (ref 82–98)
MONOCYTES # BLD AUTO: 0.9 THOUSAND/ΜL (ref 0.17–1.22)
MONOCYTES NFR BLD AUTO: 7 % (ref 4–12)
NEUTROPHILS # BLD AUTO: 8.75 THOUSANDS/ΜL (ref 1.85–7.62)
NEUTS SEG NFR BLD AUTO: 63 % (ref 43–75)
NITRITE UR QL STRIP: NEGATIVE
NRBC BLD AUTO-RTO: 0 /100 WBCS
PH UR STRIP.AUTO: 6.5 [PH] (ref 4.5–8)
PLATELET # BLD AUTO: 244 THOUSANDS/UL (ref 149–390)
PMV BLD AUTO: 11.5 FL (ref 8.9–12.7)
POTASSIUM SERPL-SCNC: 3.4 MMOL/L (ref 3.5–5.3)
PROT SERPL-MCNC: 7.3 G/DL (ref 6.4–8.2)
PROT UR STRIP-MCNC: NEGATIVE MG/DL
RBC # BLD AUTO: 4.76 MILLION/UL (ref 3.81–5.12)
SODIUM SERPL-SCNC: 138 MMOL/L (ref 136–145)
SP GR UR STRIP.AUTO: 1.02 (ref 1–1.03)
UROBILINOGEN UR QL STRIP.AUTO: 0.2 E.U./DL
WBC # BLD AUTO: 13.71 THOUSAND/UL (ref 4.31–10.16)

## 2018-10-24 PROCEDURE — 99284 EMERGENCY DEPT VISIT MOD MDM: CPT

## 2018-10-24 PROCEDURE — 96374 THER/PROPH/DIAG INJ IV PUSH: CPT

## 2018-10-24 PROCEDURE — 36415 COLL VENOUS BLD VENIPUNCTURE: CPT | Performed by: EMERGENCY MEDICINE

## 2018-10-24 PROCEDURE — 81003 URINALYSIS AUTO W/O SCOPE: CPT | Performed by: EMERGENCY MEDICINE

## 2018-10-24 PROCEDURE — 81025 URINE PREGNANCY TEST: CPT | Performed by: EMERGENCY MEDICINE

## 2018-10-24 PROCEDURE — 80053 COMPREHEN METABOLIC PANEL: CPT | Performed by: EMERGENCY MEDICINE

## 2018-10-24 PROCEDURE — 96361 HYDRATE IV INFUSION ADD-ON: CPT

## 2018-10-24 PROCEDURE — 74176 CT ABD & PELVIS W/O CONTRAST: CPT

## 2018-10-24 PROCEDURE — 85025 COMPLETE CBC W/AUTO DIFF WBC: CPT | Performed by: EMERGENCY MEDICINE

## 2018-10-24 RX ORDER — NAPROXEN 500 MG/1
500 TABLET ORAL EVERY 8 HOURS PRN
Qty: 20 TABLET | Refills: 0 | Status: SHIPPED | OUTPATIENT
Start: 2018-10-24 | End: 2018-10-27

## 2018-10-24 RX ORDER — KETOROLAC TROMETHAMINE 30 MG/ML
30 INJECTION, SOLUTION INTRAMUSCULAR; INTRAVENOUS ONCE
Status: COMPLETED | OUTPATIENT
Start: 2018-10-24 | End: 2018-10-24

## 2018-10-24 RX ADMIN — SODIUM CHLORIDE 1000 ML: 0.9 INJECTION, SOLUTION INTRAVENOUS at 18:33

## 2018-10-24 RX ADMIN — KETOROLAC TROMETHAMINE 30 MG: 30 INJECTION, SOLUTION INTRAMUSCULAR at 18:36

## 2018-10-24 NOTE — DISCHARGE INSTRUCTIONS
Acute Abdominal Pain   WHAT YOU NEED TO KNOW:   What do I need to know about acute abdominal pain? Acute abdominal pain usually starts suddenly and gets worse quickly  What are minor causes of acute abdominal pain? · An allergic reaction to food, or food poisoning    · Stress    · Acid reflux    · Constipation    · Monthly period pain in females  What are serious causes of acute abdominal pain? · Inflammation or rupture of your appendix    · Swelling or an infection in your abdomen or organ    · A blockage in your bowels    · An ulcer or a tear in your esophagus, stomach, or intestines    · Bleeding in your abdomen or an organ    · Stones in your kidney or gallbladder    · Diseases of the fallopian tubes or ovaries    · An ectopic pregnancy  How is the cause of acute abdominal pain diagnosed? Your healthcare provider will ask about your signs and symptoms  Tell the provider when your symptoms started and about any recent travel or surgery  Also tell him what makes the pain better or worse, and what treatments you have tried  The provider will examine you  Based on what your provider finds from the exam, and your symptoms, you may need other tests  Examples include blood or urine tests, an ultrasound, a CT scan, or an endoscopy  How is acute abdominal pain treated? Treatment may depend on the cause of your abdominal pain  You may need any of the following:  · Medicines  may be given to decrease pain, treat an infection, and manage your symptoms, such as constipation  · Surgery  may be needed to treat a serious cause of abdominal pain  Examples include surgery to treat appendicitis or a blockage in your bowels  How can I manage my symptoms? · Apply heat  on your abdomen for 20 to 30 minutes every 2 hours for as many days as directed  Heat helps decrease pain and muscle spasms  · Make changes to the food you eat as directed    Do not eat foods that cause abdominal pain or other symptoms  Eat small meals more often  ¨ Eat more high-fiber foods if you are constipated  High-fiber foods include fruits, vegetables, whole-grain foods, and legumes  ¨ Do not eat foods that cause gas if you have bloating  Examples include broccoli, cabbage, and cauliflower  Do not drink soda or carbonated drinks, because these may also cause gas  ¨ Do not eat foods or drinks that contain sorbitol or fructose if you have diarrhea and bloating  Some examples are fruit juices, candy, jelly, and sugar-free gum  ¨ Do not eat high-fat foods, such as fried foods, cheeseburgers, hot dogs, and desserts  ¨ Limit or do not drink caffeine  Caffeine may make symptoms, such as heart burn or nausea, worse  ¨ Drink plenty of liquids to prevent dehydration from diarrhea or vomiting  Ask your healthcare provider how much liquid to drink each day and which liquids are best for you  · Manage your stress  Stress may cause abdominal pain  Your healthcare provider may recommend relaxation techniques and deep breathing exercises to help decrease your stress  Your healthcare provider may recommend you talk to someone about your stress or anxiety, such as a counselor or a trusted friend  Get plenty of sleep and exercise regularly  · Limit or do not drink alcohol  Alcohol can make your abdominal pain worse  Ask your healthcare provider if it is safe for you to drink alcohol  Also ask how much is safe for you to drink  · Do not smoke  Nicotine and other chemicals in cigarettes can damage your esophagus and stomach  Ask your healthcare provider for information if you currently smoke and need help to quit  E-cigarettes or smokeless tobacco still contain nicotine  Talk to your healthcare provider before you use these products  When should I seek immediate care? · You vomit blood or cannot stop vomiting  · You have blood in your bowel movement or it looks like tar       · You have bleeding from your rectum  · Your abdomen is larger than usual, more painful, and hard  · You have severe pain in your abdomen  · You stop passing gas and having bowel movements  · You feel weak, dizzy, or faint  When should I contact my healthcare provider? · You have a fever  · You have new signs and symptoms  · Your symptoms do not get better with treatment  · You have questions or concerns about your condition or care  CARE AGREEMENT:   You have the right to help plan your care  Learn about your health condition and how it may be treated  Discuss treatment options with your caregivers to decide what care you want to receive  You always have the right to refuse treatment  The above information is an  only  It is not intended as medical advice for individual conditions or treatments  Talk to your doctor, nurse or pharmacist before following any medical regimen to see if it is safe and effective for you  © 2017 [employer] Information is for End User's use only and may not be sold, redistributed or otherwise used for commercial purposes  All illustrations and images included in CareNotes® are the copyrighted property of [employer]  or [employer]  Flank Pain   AMBULATORY CARE:   Flank pain  is felt in the area below your ribcage and above your hip bones, often in the lower back  Your pain may be dull or so severe that you cannot get comfortable  The pain may stay in one area or radiate to another area  It may worsen and lighten in waves  Flank pain is often a sign of problems with your urinary tract, such as a kidney stone or infection  Seek care immediately if:   · You have a fever  · Your heart is fluttering or jumping  · You see blood in your urine  · Your pain radiates into your lower abdomen and genital area  · You have intense pain in your low back next to your spine       · You are much more tired than usual and have no desire to eat      · You have a headache and your muscles jerk  Contact your healthcare provider if:   · You have an upset stomach and are vomiting  · You have to urinate more often, and with urgency  · Your pain worsens or does not improve, and you cannot get comfortable  · You pass a stone when you urinate  · You have questions or concerns about your condition or care  Treatment for flank pain  may include medicine to decrease pain or treat a bacterial infection  Follow up with your healthcare provider as directed:  Write down your questions so you remember to ask them during your visits  © 2017 [employer]  Information is for End User's use only and may not be sold, redistributed or otherwise used for commercial purposes  All illustrations and images included in CareNotes® are the copyrighted property of [employer]  or Daren Hunter  The above information is an  only  It is not intended as medical advice for individual conditions or treatments  Talk to your doctor, nurse or pharmacist before following any medical regimen to see if it is safe and effective for you  Pelvic Pain   WHAT YOU NEED TO KNOW:   Pelvic pain may be caused by a number of conditions, such as irritable bowel syndrome, appendicitis, or constipation  A urinary tract infection, prostate inflammation, menstrual cramps, or kidney stones can also cause pelvic pain  You may have pain on one or both sides of your pelvis  Pelvic pain can develop if you have trauma to your pelvis or if you sit or stand for a long time  DISCHARGE INSTRUCTIONS:   Medicines: You may need any of the following:  · NSAIDs , such as ibuprofen, help decrease swelling, pain, and fever  This medicine is available with or without a doctor's order  NSAIDs can cause stomach bleeding or kidney problems in certain people  If you take blood thinner medicine, always ask your healthcare provider if NSAIDs are safe for you  Always read the medicine label and follow directions  · Prescription pain medicine  may be given  Ask how to take this medicine safely  · Birth control medicines  may help decrease pain in women  · Take your medicine as directed  Contact your healthcare provider if you think your medicine is not helping or if you have side effects  Tell him or her if you are allergic to any medicine  Keep a list of the medicines, vitamins, and herbs you take  Include the amounts, and when and why you take them  Bring the list or the pill bottles to follow-up visits  Carry your medicine list with you in case of an emergency  Call 911 for any of the following:   · You have severe chest pain and sudden trouble breathing  Contact your healthcare provider if:   · You have a fever  · You have nausea, vomiting, or diarrhea  · Your pain does not go away, or it gets worse, even after treatment  · You have numbness in your legs or toes  · You have heavy or unusual vaginal bleeding  · You have questions or concerns about your condition or care  Manage your pelvic pain:   · Keep a pain record  Write down when your pain happens and how severe it is  Include any other symptoms you have with your pain  A record will help you keep track of pain cycles  Bring the record with you to your follow-up visits  It may also help your healthcare provider find out what is causing your pain  · Learn ways to relax  Deep breathing, meditation, and relaxation techniques can help decrease your pain  When you are tense, your pain may increase  · Treat or prevent constipation  Drink liquids as directed  You may need to drink more liquid than usual  Ask your healthcare provider how much liquid to drink each day  Eat high-fiber foods  High-fiber foods include fruit, vegetables, whole-grain breads and cereals, and beans  You may need to change the foods you eat if you have irritable bowel syndrome    Follow up with your healthcare provider as directed: You may need physical therapy  You may need to see an orthopedic specialist  Write down your questions so you remember to ask them during your visits  © 2017 [facility] Information is for End User's use only and may not be sold, redistributed or otherwise used for commercial purposes  All illustrations and images included in CareNotes® are the copyrighted property of [employer]  or Daren Hunter  The above information is an  only  It is not intended as medical advice for individual conditions or treatments  Talk to your doctor, nurse or pharmacist before following any medical regimen to see if it is safe and effective for you

## 2018-10-24 NOTE — ED PROVIDER NOTES
History  Chief Complaint   Patient presents with    Flank Pain     bilateral flank pain wrapping around to abdomen  believes it may be kidneys  denies urinary symptoms     Pt gives hx of starting about 4:30 AM with right greater then left low back pain - Occ radiating to front  No N/V/D/C  Has urinary frequency - no hematuria  No CP or SOB  No fever/chills  No trauma or overuse hx  States used her oxycodone with some help  PMH  Chronic pain; Anxiety; migraine; HTN        History provided by:  Patient  Flank Pain   Pain location:  L flank and R flank  Pain quality: aching, shooting and stiffness    Pain radiates to:  LLQ and RLQ  Progression:  Waxing and waning  Chronicity:  New  Context: not alcohol use, not diet changes, not medication withdrawal, not previous surgeries, not recent illness, not recent travel, not retching, not sick contacts, not suspicious food intake and not trauma    Relieved by:  Not moving  Worsened by: Movement  Associated symptoms: no belching, no chest pain, no chills, no constipation, no cough, no diarrhea, no dysuria, no fever, no hematemesis, no hematochezia, no hematuria, no melena, no shortness of breath, no sore throat, no vaginal bleeding, no vaginal discharge and no vomiting    Risk factors: obesity    Risk factors: not pregnant and no recent hospitalization        Prior to Admission Medications   Prescriptions Last Dose Informant Patient Reported? Taking?    butalbital-acetaminophen-caffeine (FIORICET,ESGIC) -40 mg per tablet   Yes Yes   Sig: Take 1 tablet by mouth every 4 (four) hours as needed for headaches   diazepam (VALIUM) 10 mg tablet   Yes Yes   Sig: TAKE 1-2 TABLETS BY MOUTH 1-2 HOURS BEFORE PROCEDURE   gabapentin (NEURONTIN) 300 mg capsule   Yes Yes   Sig: TAKE 1 CAPSULE BY MOUTH 3 TO 4 TIMES A DAY   oxyCODONE (ROXICODONE) 10 MG TABS   Yes Yes   Sig: Take 10 mg by mouth every 6 (six) hours as needed      Facility-Administered Medications: None       Past Medical History:   Diagnosis Date    Anxiety     Arachnoid cyst     Chronic pain     back    Depression     Hypertension     Migraine        Past Surgical History:   Procedure Laterality Date     SECTION      CHOLECYSTECTOMY      CRANIOTOMY Right 3/1/2017    Procedure: RIGHT IMAGE GUIDED RETROMASTOID CRANIOTOMY FOR FENESTRATION AND PLACEMENT OF INTERNAL CYSTO-SUBARACHNOID SHUNT;  Surgeon: Arianna Roque MD;  Location: [facility];  Service:    [facility] MOUTH SURGERY      WISDOM TOOTH EXTRACTION         History reviewed  No pertinent family history  I have reviewed and agree with the history as documented  Social History   Substance Use Topics    Smoking status: Current Every Day Smoker     Packs/day: 0 50    Smokeless tobacco: Never Used    Alcohol use No        Review of Systems   Constitutional: Negative for appetite change, chills, diaphoresis and fever  HENT: Negative  Negative for congestion, facial swelling, mouth sores, sore throat, trouble swallowing and voice change  Eyes: Negative  Negative for visual disturbance  Respiratory: Negative  Negative for cough, chest tightness and shortness of breath  Cardiovascular: Negative  Negative for chest pain and leg swelling  Gastrointestinal: Negative for blood in stool, constipation, diarrhea, hematemesis, hematochezia, melena and vomiting  Genitourinary: Positive for flank pain and pelvic pain  Negative for dysuria, hematuria, vaginal bleeding and vaginal discharge  Musculoskeletal: Negative for arthralgias, gait problem, joint swelling, myalgias, neck pain and neck stiffness  Skin: Negative  Negative for pallor, rash and wound  Allergic/Immunologic: Negative for immunocompromised state  Neurological: Negative  Negative for dizziness, tremors, syncope, light-headedness and headaches  Psychiatric/Behavioral: Negative  Negative for confusion, decreased concentration, hallucinations and self-injury     All other systems reviewed and are negative  Physical Exam  Physical Exam   Constitutional: She is oriented to person, place, and time  She appears well-developed and well-nourished  She is active and cooperative  Non-toxic appearance  She does not have a sickly appearance  No distress  Obese, NAD  HENT:   Head: Normocephalic and atraumatic  Mouth/Throat: Oropharynx is clear and moist  Mucous membranes are not dry and not cyanotic  No posterior oropharyngeal edema or posterior oropharyngeal erythema  Eyes: Pupils are equal, round, and reactive to light  Conjunctivae and EOM are normal    Neck: Normal range of motion and phonation normal  Neck supple  Cardiovascular: Normal rate, regular rhythm, intact distal pulses and normal pulses  No perf edema or calf tenderness   Pulmonary/Chest: Effort normal  No stridor  No respiratory distress  She has no wheezes  She has no rhonchi  She has no rales  Abdominal: Soft  Bowel sounds are normal  She exhibits no distension  There is tenderness in the right lower quadrant  There is CVA tenderness (right)  There is no rigidity and no guarding  Neurological: She is alert and oriented to person, place, and time  She has normal strength  No cranial nerve deficit  She displays a negative Romberg sign  Skin: Skin is warm and dry  No petechiae and no rash noted  She is not diaphoretic  No cyanosis or erythema  Psychiatric: She has a normal mood and affect  Her speech is normal and behavior is normal  Thought content normal  Cognition and memory are normal    Vitals reviewed        Vital Signs  ED Triage Vitals [10/24/18 1801]   Temperature Pulse Respirations Blood Pressure SpO2   99 1 °F (37 3 °C) 104 20 (!) 167/102 99 %      Temp Source Heart Rate Source Patient Position - Orthostatic VS BP Location FiO2 (%)   Temporal Monitor Sitting Left arm --      Pain Score       Worst Possible Pain           Vitals:    10/24/18 1801 10/24/18 1845 10/24/18 1900 10/24/18 1930   BP: (!) 167/102 130/69 146/62 146/62   Pulse: 104 100 97 92   Patient Position - Orthostatic VS: Sitting   Lying       Visual Acuity      ED Medications  Medications   sodium chloride 0 9 % bolus 1,000 mL (0 mL Intravenous Stopped 10/24/18 1923)   ketorolac (TORADOL) injection 30 mg (30 mg Intravenous Given 10/24/18 1836)       Diagnostic Studies  Results Reviewed     Procedure Component Value Units Date/Time    Comprehensive metabolic panel [[id number]]  (Abnormal) Collected:  10/24/18 1834    Lab Status:  Final result Specimen:  Blood from Arm, Right Updated:  10/24/18 1909     Sodium 138 mmol/L      Potassium 3 4 (L) mmol/L      Chloride 102 mmol/L      CO2 25 mmol/L      ANION GAP 11 mmol/L      BUN 8 mg/dL      Creatinine 0 75 mg/dL      Glucose 131 mg/dL      Calcium 8 9 mg/dL      AST 16 U/L      ALT 30 U/L      Alkaline Phosphatase 93 U/L      Total Protein 7 3 g/dL      Albumin 3 6 g/dL      Total Bilirubin 0 20 mg/dL      eGFR 104 ml/min/1 73sq m     Narrative:         National Kidney Disease Education Program recommendations are as follows:  GFR calculation is accurate only with a steady state creatinine  Chronic Kidney disease less than 60 ml/min/1 73 sq  meters  Kidney failure less than 15 ml/min/1 73 sq  meters      CBC and differential [[id number]]  (Abnormal) Collected:  10/24/18 1834    Lab Status:  Final result Specimen:  Blood from Arm, Right Updated:  10/24/18 1853     WBC 13 71 (H) Thousand/uL      RBC 4 76 Million/uL      Hemoglobin 13 5 g/dL      Hematocrit 40 3 %      MCV 85 fL      MCH 28 4 pg      MCHC 33 5 g/dL      RDW 12 2 %      MPV 11 5 fL      Platelets 724 Thousands/uL      nRBC 0 /100 WBCs      Neutrophils Relative 63 %      Immat GRANS % 0 %      Lymphocytes Relative 28 %      Monocytes Relative 7 %      Eosinophils Relative 1 %      Basophils Relative 1 %      Neutrophils Absolute 8 75 (H) Thousands/µL      Immature Grans Absolute 0 06 Thousand/uL      Lymphocytes Absolute 3 79 Thousands/µL Monocytes Absolute 0 90 Thousand/µL      Eosinophils Absolute 0 14 Thousand/µL      Basophils Absolute 0 07 Thousands/µL     UA w Reflex to Microscopic w Reflex to Culture [[id number]] Collected:  10/24/18 1834    Lab Status:  Final result Specimen:  Urine from Urine, Clean Catch Updated:  10/24/18 1852     Color, UA Yellow     Clarity, UA Clear     Specific [place], UA 1 020     pH, UA 6 5     Leukocytes, UA Negative     Nitrite, UA Negative     Protein, UA Negative mg/dl      Glucose, UA Negative mg/dl      Ketones, UA Negative mg/dl      Urobilinogen, UA 0 2 E U /dl      Bilirubin, UA Negative     Blood, UA Negative    POCT pregnancy, urine [[id number]]  (Normal) Resulted:  10/24/18 1834    Lab Status:  Final result Updated:  10/24/18 1834     EXT PREG TEST UR (Ref: Negative) negative                 CT renal stone study abdomen pelvis without contrast   Final Result by Daniella Colon MD (10/24 1907)      Potential mild circumferential bladder wall thickening; limited assessment secondary to partial distention  Please assess for a cystitis  The study was marked in [facility] for immediate notification  Workstation performed: [id number]                    Procedures  Procedures       Phone Contacts  ED Phone Contact    ED Course  ED Course as of Oct 25 1654   Wed Oct 24, 2018   1834 PREGNANCY TEST URINE: negative   1855 Leukocytes, UA: Negative   1855 Nitrite, UA: Negative   1855 WBC: (!) 13 71   1855 Hemoglobin: 13 5   1855 HCT: 40 3   1909 Glucose: 131   1911 BUN: 8   1911 Creatinine: 0 75   1911 Discussed findings with pt   Most likely  musculoskeletal   To follow with PCP                                MDM  CritCare Time    Disposition  Final diagnoses:   Bilateral flank pain   Lower abdominal pain     Time reflects when diagnosis was documented in both MDM as applicable and the Disposition within this note     Time User Action Codes Description Comment    10/24/2018  7:07 PM Martha Lighter Add [R10 9] Bilateral flank pain     10/24/2018  7:07 PM Elsie Fuentes Add [R10 30] Lower abdominal pain       ED Disposition     ED Disposition Condition Comment    Discharge  [facility] discharge to home/self care  Condition at discharge: Stable        Follow-up Information     Follow up With Specialties Details Why [address] Lyn[address] DO [facility]   [address] [place] [id number]  [phone]            Discharge Medication List as of 10/24/2018  7:16 PM      START taking these medications    Details   naproxen (NAPROSYN) 500 mg tablet Take 1 tablet (500 mg total) by mouth every 8 (eight) hours as needed for moderate pain, Starting Wed 10/24/2018, Print         CONTINUE these medications which have NOT CHANGED    Details   butalbital-acetaminophen-caffeine (FIORICET,ESGIC) -40 mg per tablet Take 1 tablet by mouth every 4 (four) hours as needed for headaches, Historical Med      diazepam (VALIUM) 10 mg tablet TAKE 1-2 TABLETS BY MOUTH 1-2 HOURS BEFORE PROCEDURE, Historical Med      gabapentin (NEURONTIN) 300 mg capsule TAKE 1 CAPSULE BY MOUTH 3 TO 4 TIMES A DAY, Historical Med      oxyCODONE (ROXICODONE) 10 MG TABS Take 10 mg by mouth every 6 (six) hours as needed, Starting Mon 6/4/2018, Historical Med           No discharge procedures on file      ED Provider  Electronically Signed by           Shaunna Valenzuela DO  10/25/18 5627

## 2018-10-27 ENCOUNTER — APPOINTMENT (EMERGENCY)
Dept: CT IMAGING | Facility: HOSPITAL | Age: 33
DRG: 720 | End: 2018-10-27
Payer: COMMERCIAL

## 2018-10-27 ENCOUNTER — HOSPITAL ENCOUNTER (INPATIENT)
Facility: HOSPITAL | Age: 33
LOS: 2 days | Discharge: HOME | DRG: 720 | End: 2018-10-30
Attending: EMERGENCY MEDICINE | Admitting: INTERNAL MEDICINE
Payer: COMMERCIAL

## 2018-10-27 DIAGNOSIS — R93.89 ABNORMAL CT SCAN, CHEST: ICD-10-CM

## 2018-10-27 DIAGNOSIS — Z72.0 TOBACCO ABUSE: ICD-10-CM

## 2018-10-27 DIAGNOSIS — R06.00 DYSPNEA: Primary | ICD-10-CM

## 2018-10-27 DIAGNOSIS — G89.29 OTHER CHRONIC PAIN: ICD-10-CM

## 2018-10-27 DIAGNOSIS — J96.01 ACUTE RESPIRATORY FAILURE WITH HYPOXIA (HCC): ICD-10-CM

## 2018-10-27 DIAGNOSIS — R74.02 ELEVATED LDH: ICD-10-CM

## 2018-10-27 DIAGNOSIS — J18.9 COMMUNITY ACQUIRED PNEUMONIA, UNSPECIFIED LATERALITY: ICD-10-CM

## 2018-10-27 DIAGNOSIS — D72.829 LEUKOCYTOSIS: ICD-10-CM

## 2018-10-27 PROBLEM — R59.1 LYMPHADENOPATHY: Status: ACTIVE | Noted: 2018-10-27

## 2018-10-27 PROBLEM — A41.9 SEPSIS (HCC): Status: ACTIVE | Noted: 2018-10-27

## 2018-10-27 LAB
ALBUMIN SERPL BCP-MCNC: 3.6 G/DL (ref 3.5–5)
ALP SERPL-CCNC: 88 U/L (ref 46–116)
ALT SERPL W P-5'-P-CCNC: 25 U/L (ref 12–78)
ANION GAP SERPL CALCULATED.3IONS-SCNC: 9 MMOL/L (ref 4–13)
APTT PPP: 33 SECONDS (ref 24–36)
AST SERPL W P-5'-P-CCNC: 21 U/L (ref 5–45)
B-HCG SERPL-ACNC: <2 MIU/ML
BACTERIA UR QL AUTO: ABNORMAL /HPF
BASOPHILS # BLD AUTO: 0.08 THOUSANDS/ΜL (ref 0–0.1)
BASOPHILS NFR BLD AUTO: 0 % (ref 0–1)
BILIRUB SERPL-MCNC: 0.6 MG/DL (ref 0.2–1)
BILIRUB UR QL STRIP: NEGATIVE
BUN SERPL-MCNC: 8 MG/DL (ref 5–25)
CALCIUM SERPL-MCNC: 8.9 MG/DL (ref 8.3–10.1)
CHLORIDE SERPL-SCNC: 103 MMOL/L (ref 100–108)
CLARITY UR: ABNORMAL
CO2 SERPL-SCNC: 25 MMOL/L (ref 21–32)
COLOR UR: ABNORMAL
CREAT SERPL-MCNC: 0.71 MG/DL (ref 0.6–1.3)
DEPRECATED D DIMER PPP: 358 NG/ML (FEU) (ref 0–424)
EOSINOPHIL # BLD AUTO: 0.01 THOUSAND/ΜL (ref 0–0.61)
EOSINOPHIL NFR BLD AUTO: 0 % (ref 0–6)
ERYTHROCYTE [DISTWIDTH] IN BLOOD BY AUTOMATED COUNT: 12.2 % (ref 11.6–15.1)
EXT PREG TEST URINE: NEGATIVE
GFR SERPL CREATININE-BSD FRML MDRD: 111 ML/MIN/1.73SQ M
GLUCOSE SERPL-MCNC: 137 MG/DL (ref 65–140)
GLUCOSE UR STRIP-MCNC: NEGATIVE MG/DL
HCT VFR BLD AUTO: 40 % (ref 34.8–46.1)
HGB BLD-MCNC: 13.2 G/DL (ref 11.5–15.4)
HGB UR QL STRIP.AUTO: ABNORMAL
IMM GRANULOCYTES # BLD AUTO: 0.15 THOUSAND/UL (ref 0–0.2)
IMM GRANULOCYTES NFR BLD AUTO: 1 % (ref 0–2)
INR PPP: 1.11 (ref 0.86–1.17)
KETONES UR STRIP-MCNC: NEGATIVE MG/DL
L PNEUMO1 AG UR QL IA.RAPID: NEGATIVE
LACTATE SERPL-SCNC: 1.2 MMOL/L (ref 0.5–2)
LEUKOCYTE ESTERASE UR QL STRIP: NEGATIVE
LYMPHOCYTES # BLD AUTO: 1.98 THOUSANDS/ΜL (ref 0.6–4.47)
LYMPHOCYTES NFR BLD AUTO: 8 % (ref 14–44)
MCH RBC QN AUTO: 28.1 PG (ref 26.8–34.3)
MCHC RBC AUTO-ENTMCNC: 33 G/DL (ref 31.4–37.4)
MCV RBC AUTO: 85 FL (ref 82–98)
MONOCYTES # BLD AUTO: 1.51 THOUSAND/ΜL (ref 0.17–1.22)
MONOCYTES NFR BLD AUTO: 6 % (ref 4–12)
NEUTROPHILS # BLD AUTO: 21.88 THOUSANDS/ΜL (ref 1.85–7.62)
NEUTS SEG NFR BLD AUTO: 85 % (ref 43–75)
NITRITE UR QL STRIP: NEGATIVE
NON-SQ EPI CELLS URNS QL MICRO: ABNORMAL /HPF
NRBC BLD AUTO-RTO: 0 /100 WBCS
PH UR STRIP.AUTO: 6.5 [PH] (ref 4.5–8)
PLATELET # BLD AUTO: 246 THOUSANDS/UL (ref 149–390)
PMV BLD AUTO: 11.3 FL (ref 8.9–12.7)
POTASSIUM SERPL-SCNC: 3.9 MMOL/L (ref 3.5–5.3)
PROCALCITONIN SERPL-MCNC: 2.87 NG/ML
PROT SERPL-MCNC: 7.3 G/DL (ref 6.4–8.2)
PROT UR STRIP-MCNC: NEGATIVE MG/DL
PROTHROMBIN TIME: 13.8 SECONDS (ref 11.8–14.2)
RBC # BLD AUTO: 4.69 MILLION/UL (ref 3.81–5.12)
RBC #/AREA URNS AUTO: ABNORMAL /HPF
S PNEUM AG UR QL: NEGATIVE
SODIUM SERPL-SCNC: 137 MMOL/L (ref 136–145)
SP GR UR STRIP.AUTO: <=1.005 (ref 1–1.03)
TROPONIN I SERPL-MCNC: <0.02 NG/ML
UROBILINOGEN UR QL STRIP.AUTO: 0.2 E.U./DL
WBC # BLD AUTO: 25.61 THOUSAND/UL (ref 4.31–10.16)
WBC #/AREA URNS AUTO: ABNORMAL /HPF

## 2018-10-27 PROCEDURE — 99285 EMERGENCY DEPT VISIT HI MDM: CPT

## 2018-10-27 PROCEDURE — 85379 FIBRIN DEGRADATION QUANT: CPT | Performed by: EMERGENCY MEDICINE

## 2018-10-27 PROCEDURE — 99220 PR INITIAL OBSERVATION CARE/DAY 70 MINUTES: CPT | Performed by: INTERNAL MEDICINE

## 2018-10-27 PROCEDURE — 87449 NOS EACH ORGANISM AG IA: CPT | Performed by: INTERNAL MEDICINE

## 2018-10-27 PROCEDURE — 74177 CT ABD & PELVIS W/CONTRAST: CPT

## 2018-10-27 PROCEDURE — 84702 CHORIONIC GONADOTROPIN TEST: CPT | Performed by: INTERNAL MEDICINE

## 2018-10-27 PROCEDURE — 81001 URINALYSIS AUTO W/SCOPE: CPT | Performed by: EMERGENCY MEDICINE

## 2018-10-27 PROCEDURE — 93005 ELECTROCARDIOGRAM TRACING: CPT

## 2018-10-27 PROCEDURE — 81025 URINE PREGNANCY TEST: CPT | Performed by: EMERGENCY MEDICINE

## 2018-10-27 PROCEDURE — 84484 ASSAY OF TROPONIN QUANT: CPT | Performed by: EMERGENCY MEDICINE

## 2018-10-27 PROCEDURE — 96361 HYDRATE IV INFUSION ADD-ON: CPT

## 2018-10-27 PROCEDURE — 94760 N-INVAS EAR/PLS OXIMETRY 1: CPT

## 2018-10-27 PROCEDURE — 80053 COMPREHEN METABOLIC PANEL: CPT | Performed by: EMERGENCY MEDICINE

## 2018-10-27 PROCEDURE — 85025 COMPLETE CBC W/AUTO DIFF WBC: CPT | Performed by: EMERGENCY MEDICINE

## 2018-10-27 PROCEDURE — 71275 CT ANGIOGRAPHY CHEST: CPT

## 2018-10-27 PROCEDURE — 85610 PROTHROMBIN TIME: CPT | Performed by: EMERGENCY MEDICINE

## 2018-10-27 PROCEDURE — 87631 RESP VIRUS 3-5 TARGETS: CPT | Performed by: INTERNAL MEDICINE

## 2018-10-27 PROCEDURE — 87040 BLOOD CULTURE FOR BACTERIA: CPT | Performed by: EMERGENCY MEDICINE

## 2018-10-27 PROCEDURE — 96374 THER/PROPH/DIAG INJ IV PUSH: CPT

## 2018-10-27 PROCEDURE — 84145 PROCALCITONIN (PCT): CPT | Performed by: INTERNAL MEDICINE

## 2018-10-27 PROCEDURE — 36415 COLL VENOUS BLD VENIPUNCTURE: CPT | Performed by: EMERGENCY MEDICINE

## 2018-10-27 PROCEDURE — 83605 ASSAY OF LACTIC ACID: CPT | Performed by: INTERNAL MEDICINE

## 2018-10-27 PROCEDURE — 94668 MNPJ CHEST WALL SBSQ: CPT

## 2018-10-27 PROCEDURE — 87081 CULTURE SCREEN ONLY: CPT | Performed by: INTERNAL MEDICINE

## 2018-10-27 PROCEDURE — 94640 AIRWAY INHALATION TREATMENT: CPT

## 2018-10-27 PROCEDURE — 85730 THROMBOPLASTIN TIME PARTIAL: CPT | Performed by: EMERGENCY MEDICINE

## 2018-10-27 RX ORDER — ACETAMINOPHEN 325 MG/1
650 TABLET ORAL EVERY 6 HOURS PRN
Status: DISCONTINUED | OUTPATIENT
Start: 2018-10-27 | End: 2018-10-30 | Stop reason: HOSPADM

## 2018-10-27 RX ORDER — SODIUM CHLORIDE 9 MG/ML
75 INJECTION, SOLUTION INTRAVENOUS CONTINUOUS
Status: DISCONTINUED | OUTPATIENT
Start: 2018-10-27 | End: 2018-10-30 | Stop reason: HOSPADM

## 2018-10-27 RX ORDER — KETOROLAC TROMETHAMINE 30 MG/ML
15 INJECTION, SOLUTION INTRAMUSCULAR; INTRAVENOUS ONCE
Status: COMPLETED | OUTPATIENT
Start: 2018-10-27 | End: 2018-10-27

## 2018-10-27 RX ORDER — LEVALBUTEROL 1.25 MG/.5ML
1.25 SOLUTION, CONCENTRATE RESPIRATORY (INHALATION)
Status: DISCONTINUED | OUTPATIENT
Start: 2018-10-27 | End: 2018-10-30 | Stop reason: HOSPADM

## 2018-10-27 RX ORDER — ONDANSETRON 2 MG/ML
4 INJECTION INTRAMUSCULAR; INTRAVENOUS EVERY 4 HOURS PRN
Status: DISCONTINUED | OUTPATIENT
Start: 2018-10-27 | End: 2018-10-30 | Stop reason: HOSPADM

## 2018-10-27 RX ORDER — ONDANSETRON 2 MG/ML
4 INJECTION INTRAMUSCULAR; INTRAVENOUS EVERY 4 HOURS PRN
Status: DISCONTINUED | OUTPATIENT
Start: 2018-10-27 | End: 2018-10-27

## 2018-10-27 RX ORDER — GABAPENTIN 300 MG/1
300 CAPSULE ORAL 3 TIMES DAILY
Status: DISCONTINUED | OUTPATIENT
Start: 2018-10-27 | End: 2018-10-30 | Stop reason: HOSPADM

## 2018-10-27 RX ORDER — OXYCODONE HYDROCHLORIDE 10 MG/1
10 TABLET ORAL EVERY 4 HOURS PRN
Status: DISCONTINUED | OUTPATIENT
Start: 2018-10-27 | End: 2018-10-28

## 2018-10-27 RX ORDER — SODIUM CHLORIDE FOR INHALATION 0.9 %
3 VIAL, NEBULIZER (ML) INHALATION
Status: DISCONTINUED | OUTPATIENT
Start: 2018-10-27 | End: 2018-10-30 | Stop reason: HOSPADM

## 2018-10-27 RX ORDER — OXYCODONE HYDROCHLORIDE 5 MG/1
5 TABLET ORAL EVERY 4 HOURS PRN
Status: DISCONTINUED | OUTPATIENT
Start: 2018-10-27 | End: 2018-10-30 | Stop reason: HOSPADM

## 2018-10-27 RX ORDER — ALBUTEROL SULFATE 2.5 MG/3ML
2.5 SOLUTION RESPIRATORY (INHALATION) EVERY 6 HOURS PRN
Status: DISCONTINUED | OUTPATIENT
Start: 2018-10-27 | End: 2018-10-30 | Stop reason: HOSPADM

## 2018-10-27 RX ADMIN — KETOROLAC TROMETHAMINE 15 MG: 30 INJECTION, SOLUTION INTRAMUSCULAR at 12:36

## 2018-10-27 RX ADMIN — ISODIUM CHLORIDE 3 ML: 0.03 SOLUTION RESPIRATORY (INHALATION) at 21:30

## 2018-10-27 RX ADMIN — ONDANSETRON HYDROCHLORIDE 4 MG: 2 SOLUTION INTRAMUSCULAR; INTRAVENOUS at 17:37

## 2018-10-27 RX ADMIN — OXYCODONE HYDROCHLORIDE 5 MG: 5 TABLET ORAL at 22:05

## 2018-10-27 RX ADMIN — LEVALBUTEROL 1.25 MG: 1.25 SOLUTION, CONCENTRATE RESPIRATORY (INHALATION) at 21:29

## 2018-10-27 RX ADMIN — SODIUM CHLORIDE 125 ML/HR: 0.9 INJECTION, SOLUTION INTRAVENOUS at 17:50

## 2018-10-27 RX ADMIN — AZITHROMYCIN MONOHYDRATE 500 MG: 500 INJECTION, POWDER, LYOPHILIZED, FOR SOLUTION INTRAVENOUS at 16:09

## 2018-10-27 RX ADMIN — IOHEXOL 100 ML: 350 INJECTION, SOLUTION INTRAVENOUS at 14:06

## 2018-10-27 RX ADMIN — GABAPENTIN 300 MG: 300 CAPSULE ORAL at 18:44

## 2018-10-27 RX ADMIN — SODIUM CHLORIDE 1000 ML: 0.9 INJECTION, SOLUTION INTRAVENOUS at 12:31

## 2018-10-27 RX ADMIN — SODIUM CHLORIDE 125 ML/HR: 0.9 INJECTION, SOLUTION INTRAVENOUS at 15:38

## 2018-10-27 RX ADMIN — CEFTRIAXONE 1000 MG: 1 INJECTION, SOLUTION INTRAVENOUS at 15:38

## 2018-10-27 NOTE — ASSESSMENT & PLAN NOTE
· The sepsis is present on admission and secondary to community-acquired pneumonia  · Check blood cultures x 2 sets  · Check a urine culture  · Check a lactic acid level  · Check and follow the procalcitonin level  · Normal saline IV fluids  · Check influenza/RSV PCR testing  · IV ceftriaxone and IV azithromycin will be utilized to treat community-acquired pneumonia pathogens

## 2018-10-27 NOTE — ASSESSMENT & PLAN NOTE
· Subcarinal lymphadenopathy found on CT scan of the chest  · Likely reactive in nature  · Check an LDH  · She will need outpatient surveillance imaging to ensure resolution of the abnormal findings found on CT scan of the chest

## 2018-10-27 NOTE — RESPIRATORY THERAPY NOTE
RT Protocol Note  Nazia Pizarro 26 y o  female MRN: [id number]  Unit/Bed#: 401-01 Encounter: [id number]    Assessment    Principal Problem:    Sepsis ([address] )  Active Problems:    Community acquired pneumonia    Tobacco abuse    Lymphadenopathy      Home Pulmonary Medications:  none       Past Medical History:   Diagnosis Date    Anxiety     Arachnoid cyst     Chronic pain     back    Depression     Hypertension     Migraine      Social History     Social History    Marital status: /Civil Union     Spouse name: N/A    Number of children: N/A    Years of education: N/A     Social History Main Topics    Smoking status: Current Every Day Smoker     Packs/day: 0 50    Smokeless tobacco: Never Used    Alcohol use No    Drug use: No    Sexual activity: Yes     Partners: Male     Birth control/ protection: Patch     Other Topics Concern    None     Social History Narrative    None       Subjective     "I get SOB so easily, it's hard to catch my breath "    Objective    Physical Exam:   Assessment Type: Assess only  General Appearance: Awake, Alert  Respiratory Pattern: Shallow  Chest Assessment: Chest expansion symmetrical  Bilateral Breath Sounds: Diminished, Coarse    Vitals:  Blood pressure 133/89, pulse 99, temperature 98 8 °F (37 1 °C), temperature source Temporal, resp  rate 22, height 5' 7" (1 702 m), weight 108 kg (238 lb 15 7 oz), SpO2 97 %  Imaging and other studies: I have personally reviewed pertinent reports  Plan    Respiratory Plan: Mild Distress pathway  Airway Clearance Plan: Flutter       TID- 1 25/NSS, Q6PRN Albuterol for SOB/ Wheezing  Flutter for secretion mobilization

## 2018-10-27 NOTE — H&P
H&P- [facility] 1985, 26 y o  female MRN: [id number]    Unit/Bed#: ROCAEL Encounter: [id number]    Primary Care Provider: Madolyn Landau, DO   Date and time admitted to hospital: 10/27/2018 12:05 PM        * Sepsis [facility])   Assessment & Plan    · The sepsis is present on admission and secondary to community-acquired pneumonia  · Check blood cultures x 2 sets  · Check a urine culture  · Check a lactic acid level  · Check and follow the procalcitonin level  · Normal saline IV fluids  · Check influenza/RSV PCR testing  · IV ceftriaxone and IV azithromycin will be utilized to treat community-acquired pneumonia pathogens     Community acquired pneumonia   Assessment & Plan    · Check influenza/RSV testing  · Check blood cultures x 2 sets  · Check and follow the procalcitonin level  · Check a sputum culture  · IV ceftriaxone and IV azithromycin will be utilized to treat the community-acquired pneumonia  · Initiate the respiratory protocol and airway clearance protocol     Lymphadenopathy   Assessment & Plan    · Subcarinal lymphadenopathy found on CT scan of the chest  · Likely reactive in nature  · Check an LDH  · She will need outpatient surveillance imaging to ensure resolution of the abnormal findings found on CT scan of the chest     Tobacco abuse   Assessment & Plan    · Nicotine patch  · Smoking cessation counseling           VTE Prophylaxis: Enoxaparin (Lovenox)  / sequential compression device   Code Status:  Level 1-Full Code      Anticipated Length of Stay:  Patient will be admitted on an Observation basis with an anticipated length of stay of less than 2 midnights  Justification for Hospital Stay:  The patient requires IV antibiotics, continuous IV fluids, and a work-up for sepsis  Total Time for Visit, including Counseling / Coordination of Care: 45 minutes  Greater than 50% of this total time spent on direct patient counseling and coordination of care      Chief Complaint:  Shortness of breath    History of Present Illness:    Radha Waddell is a 26 y o  female who presents to the emergency department with the complaint of shortness of breath  The shortness of breath has been persistent over last 24 hours and has progressively worsened in nature  The shortness of breath is worsened with any type of exertion or physical activity  Nothing seemed to relieve the shortness of breath  She also complains of a non-productive cough as well as chest pain with deep inspiration  In addition, the patient has been experiencing nausea, vomiting, and generalized weakness  No fevers  No abdominal pain  She does complain of chills  No diarrhea  She was seen in the emergency department on 10/24/2018 for lower thoracic back pain and left flank pain  The patient continues to experience left-sided thoracic back pain and left flank pain  Review of Systems:    Review of Systems:  Per HPI, all other systems have been reviewed and were negative  Past Medical and Surgical History:     Past Medical History:   Diagnosis Date    Anxiety     Arachnoid cyst     Chronic pain     back    Depression     Hypertension     Migraine        Past Surgical History:   Procedure Laterality Date     SECTION      CHOLECYSTECTOMY      CRANIOTOMY Right 3/1/2017    Procedure: RIGHT IMAGE GUIDED RETROMASTOID CRANIOTOMY FOR FENESTRATION AND PLACEMENT OF INTERNAL CYSTO-SUBARACHNOID SHUNT;  Surgeon: Mortimer Rush, MD;  Location: [facility];  Service:    [facility] MOUTH SURGERY      WISDOM TOOTH EXTRACTION         Meds/Allergies:    Prior to Admission medications    Medication Sig Start Date End Date Taking?  Authorizing Provider   butalbital-acetaminophen-caffeine (FIORICET,ESGIC) -40 mg per tablet Take 1 tablet by mouth every 4 (four) hours as needed for headaches   Yes Historical Provider, MD   diazepam (VALIUM) 10 mg tablet TAKE 1-2 TABLETS BY MOUTH 1-2 HOURS BEFORE PROCEDURE 18  Yes Historical Provider, MD gabapentin (NEURONTIN) 300 mg capsule TAKE 1 CAPSULE BY MOUTH 3 TO 4 TIMES A DAY 6/30/18  Yes Historical Provider, MD   oxyCODONE (ROXICODONE) 10 MG TABS Take 10 mg by mouth every 6 (six) hours as needed 6/4/18  Yes Historical Provider, MD   naproxen (NAPROSYN) 500 mg tablet Take 1 tablet (500 mg total) by mouth every 8 (eight) hours as needed for moderate pain 10/24/18 10/27/18  Diomedes Simons DO     I have reviewed home medications with patient personally  Allergies: No Known Allergies    Social History:     Marital Status: /Civil Union     Substance Use History:   History   Alcohol Use No     History   Smoking Status    Current Every Day Smoker    Packs/day: 0 50   Smokeless Tobacco    Never Used     History   Drug Use No       Family History:    non-contributory    Physical Exam:     Vitals:   Blood Pressure: 106/52 (10/27/18 1542)  Pulse: 94 (10/27/18 1542)  Temperature: 99 1 °F (37 3 °C) (10/27/18 1212)  Temp Source: Temporal (10/27/18 1212)  Respirations: 20 (10/27/18 1542)  Weight - Scale: 116 kg (255 lb 4 7 oz) (10/27/18 1212)  SpO2: 94 % (10/27/18 1542)    Physical Exam  General:  NAD, awake, alert, oriented x 3  HEENT:  NC/AT, mucous membranes dry  Neck:  Supple, No JVP elevation  CV:  + S1, + S2, RRR  Pulm:  Scattered rhonchi bilaterally  Abd:  Soft, Non-tender, Non-distended  Ext:  No clubbing/cyanosis/edema  Skin:  No rashes      Additional Data:     Lab Results: I have personally reviewed pertinent reports          Results from last 7 days  Lab Units 10/27/18  1231   WBC Thousand/uL 25 61*   HEMOGLOBIN g/dL 13 2   HEMATOCRIT % 40 0   PLATELETS Thousands/uL 246   NEUTROS ABS Thousands/µL 21 88*   NEUTROS PCT % 85*   LYMPHS PCT % 8*   MONOS PCT % 6   EOS PCT % 0       Results from last 7 days  Lab Units 10/27/18  1231   SODIUM mmol/L 137   POTASSIUM mmol/L 3 9   CHLORIDE mmol/L 103   CO2 mmol/L 25   BUN mg/dL 8   CREATININE mg/dL 0 71   ANION GAP mmol/L 9   CALCIUM mg/dL 8 9   ALBUMIN g/dL 3 6   TOTAL BILIRUBIN mg/dL 0 60   ALK PHOS U/L 88   ALT U/L 25   AST U/L 21       Results from last 7 days  Lab Units 10/27/18  1231   INR  1 11                   Imaging: I have personally reviewed pertinent reports  PE Study with CT Abdomen and Pelvis with contrast   Final Result by Dejan Middleton MD (10/27 4146)         1  Limited evaluation for pulmonary embolism  No large central PE identified  If there is persistent clinical concern for pulmonary embolism, consider VQ scan  2   Nonspecific symmetric groundglass opacification of the upper and lower lobes in a pattern raising the question of hypersensitivity pneumonitis  Additional imaging differential considerations include atypical infection, alveolar proteinosis or    hemorrhage due to vasculitis are less likely considerations  Correlation with history is needed  3   Mild prominence of the prevascular and subcarinal lymph nodes, possibly infectious/inflammatory  4  No acute abdominal or pelvic pathology  I personally discussed this study with Birdie Guzman on 10/27/2018 at 2:54 PM                      Workstation performed: [id number]               Allscripts / Epic Records Reviewed: Yes     ** Please Note: This note has been constructed using a voice recognition system   **

## 2018-10-27 NOTE — ASSESSMENT & PLAN NOTE
· Check influenza/RSV testing  · Check blood cultures x 2 sets  · Check and follow the procalcitonin level  · Check a sputum culture  · IV ceftriaxone and IV azithromycin will be utilized to treat the community-acquired pneumonia  · Initiate the respiratory protocol and airway clearance protocol

## 2018-10-27 NOTE — ED PROVIDER NOTES
Pt Name: Sherwin العراقي  MRN: [id number]  [place] 1985  Age/Sex: 26 y o  female  Date of evaluation: 10/27/2018  PCP: Samantha Meneses, [address] [place] [address]    Chief Complaint   Patient presents with    Shortness of Breath     Shortness of breath since yesterday  States she's been having episodes of coughing which caused her to vomitted  Tried to go to work and was unable to climb stairs, became very short of breath and started coughing again  Substernal chest pain and pain behind shoulder blades  HPI    Tova Witt presents to the Emergency Department complaining of dyspnea  She was here recently for flank pain and now she has been coughing  Today she felt SOB walking up a flight of steps  She has pain with coughing and deep breathing  She is a smoker  No new chemical or environmental exposures  HPI      Past Medical and Surgical History    Past Medical History:   Diagnosis Date    Anxiety     Arachnoid cyst     Chronic pain     back    Depression     Hypertension     Migraine        Past Surgical History:   Procedure Laterality Date     SECTION      CHOLECYSTECTOMY      CRANIOTOMY Right 3/1/2017    Procedure: RIGHT IMAGE GUIDED RETROMASTOID CRANIOTOMY FOR FENESTRATION AND PLACEMENT OF INTERNAL CYSTO-SUBARACHNOID SHUNT;  Surgeon: Ankita Goldsmith MD;  Location: [facility];  Service:    [facility] MOUTH SURGERY      WISDOM TOOTH EXTRACTION         History reviewed  No pertinent family history  Social History   Substance Use Topics    Smoking status: Current Every Day Smoker     Packs/day: 0 50    Smokeless tobacco: Never Used    Alcohol use No              Allergies    No Known Allergies    Home Medications    Prior to Admission medications    Medication Sig Start Date End Date Taking?  Authorizing Provider   butalbital-acetaminophen-caffeine (FIORICET,ESGIC) -40 mg per tablet Take 1 tablet by mouth every 4 (four) hours as needed for headaches    Historical Provider, MD diazepam (VALIUM) 10 mg tablet TAKE 1-2 TABLETS BY MOUTH 1-2 HOURS BEFORE PROCEDURE 6/4/18   Historical Provider, MD   gabapentin (NEURONTIN) 300 mg capsule TAKE 1 CAPSULE BY MOUTH 3 TO 4 TIMES A DAY 6/30/18   Historical Provider, MD   naproxen (NAPROSYN) 500 mg tablet Take 1 tablet (500 mg total) by mouth every 8 (eight) hours as needed for moderate pain 10/24/18   Arianna Jimenez DO   oxyCODONE (ROXICODONE) 10 MG TABS Take 10 mg by mouth every 6 (six) hours as needed 6/4/18   Historical Provider, MD           Review of Systems    Review of Systems   Constitutional: Positive for fatigue  Negative for activity change, appetite change, chills, diaphoresis and fever  HENT: Negative for congestion, postnasal drip, rhinorrhea, sinus pressure, sneezing and sore throat  Eyes: Negative for pain and visual disturbance  Respiratory: Positive for cough and shortness of breath  Negative for chest tightness  Cardiovascular: Negative for chest pain, palpitations and leg swelling  Gastrointestinal: Negative for abdominal distention, abdominal pain, constipation, diarrhea, nausea and vomiting  Endocrine: Negative for polydipsia, polyphagia and polyuria  Genitourinary: Negative for decreased urine volume, difficulty urinating, dysuria, flank pain, frequency and hematuria  Musculoskeletal: Positive for back pain  Negative for arthralgias, gait problem, joint swelling and neck pain  Skin: Negative for pallor and rash  Allergic/Immunologic: Negative for immunocompromised state  Neurological: Negative for syncope, speech difficulty, weakness, light-headedness, numbness and headaches  All other systems reviewed and are negative            Physical Exam      ED Triage Vitals [10/27/18 1212]   Temperature Pulse Respirations Blood Pressure SpO2   99 1 °F (37 3 °C) (!) 112 20 129/71 94 %      Temp Source Heart Rate Source Patient Position - Orthostatic VS BP Location FiO2 (%)   Temporal Monitor Lying Right arm --      Pain Score       8               Physical Exam   Constitutional: She is oriented to person, place, and time  She appears well-developed and well-nourished  No distress  HENT:   Head: Normocephalic and atraumatic  Nose: Nose normal    Mouth/Throat: Oropharynx is clear and moist    Eyes: Pupils are equal, round, and reactive to light  Conjunctivae, EOM and lids are normal    Neck: Normal range of motion  Neck supple  Cardiovascular: Regular rhythm and normal heart sounds  Tachycardia present  Exam reveals no gallop and no friction rub  No murmur heard  Pulmonary/Chest: Effort normal and breath sounds normal  No accessory muscle usage  No respiratory distress  She has no wheezes  She has no rales  Abdominal: Soft  She exhibits no distension  There is no tenderness  There is no rebound and no guarding  Neurological: She is alert and oriented to person, place, and time  No cranial nerve deficit or sensory deficit  Skin: Skin is warm and dry  No rash noted  She is not diaphoretic  No erythema  Psychiatric: She has a normal mood and affect  Her speech is normal and behavior is normal  Judgment and thought content normal    Nursing note and vitals reviewed  Assessment and Plan    Sarahi Hale is a 26 y o  female who presents with SOB  Physical examination remarkable for tachycardia and tachypnea  Differential diagnosis (not completely inclusive) includes infectious/ inflammatory/ cardiac/ PE/ COPD  Plan will be to perform diagnostic testing and treat symptomatically  MDM    Diagnostic Results      EKG INTERPRETATION  EKG Interpretation    EKG interpreted by me  Interpretation by Trevon Pérez DO  EKG reviewed and interpreted independently      Labs:    Results for orders placed or performed during the hospital encounter of 10/27/18   CBC and differential   Result Value Ref Range    WBC 25 61 (H) 4 31 - 10 16 Thousand/uL    RBC 4 69 3 81 - 5 12 Million/uL Hemoglobin 13 2 11 5 - 15 4 g/dL    Hematocrit 40 0 34 8 - 46 1 %    MCV 85 82 - 98 fL    MCH 28 1 26 8 - 34 3 pg    MCHC 33 0 31 4 - 37 4 g/dL    RDW 12 2 11 6 - 15 1 %    MPV 11 3 8 9 - 12 7 fL    Platelets 869 490 - 566 Thousands/uL    nRBC 0 /100 WBCs    Neutrophils Relative 85 (H) 43 - 75 %    Immat GRANS % 1 0 - 2 %    Lymphocytes Relative 8 (L) 14 - 44 %    Monocytes Relative 6 4 - 12 %    Eosinophils Relative 0 0 - 6 %    Basophils Relative 0 0 - 1 %    Neutrophils Absolute 21 88 (H) 1 85 - 7 62 Thousands/µL    Immature Grans Absolute 0 15 0 00 - 0 20 Thousand/uL    Lymphocytes Absolute 1 98 0 60 - 4 47 Thousands/µL    Monocytes Absolute 1 51 (H) 0 17 - 1 22 Thousand/µL    Eosinophils Absolute 0 01 0 00 - 0 61 Thousand/µL    Basophils Absolute 0 08 0 00 - 0 10 Thousands/µL   Comprehensive metabolic panel   Result Value Ref Range    Sodium 137 136 - 145 mmol/L    Potassium 3 9 3 5 - 5 3 mmol/L    Chloride 103 100 - 108 mmol/L    CO2 25 21 - 32 mmol/L    ANION GAP 9 4 - 13 mmol/L    BUN 8 5 - 25 mg/dL    Creatinine 0 71 0 60 - 1 30 mg/dL    Glucose 137 65 - 140 mg/dL    Calcium 8 9 8 3 - 10 1 mg/dL    AST 21 5 - 45 U/L    ALT 25 12 - 78 U/L    Alkaline Phosphatase 88 46 - 116 U/L    Total Protein 7 3 6 4 - 8 2 g/dL    Albumin 3 6 3 5 - 5 0 g/dL    Total Bilirubin 0 60 0 20 - 1 00 mg/dL    eGFR 111 ml/min/1 73sq m   Troponin I   Result Value Ref Range    Troponin I <0 02 <=0 04 ng/mL   Protime-INR   Result Value Ref Range    Protime 13 8 11 8 - 14 2 seconds    INR 1 11 0 86 - 1 17   APTT   Result Value Ref Range    PTT 33 24 - 36 seconds   D-Dimer   Result Value Ref Range    D-Dimer, Quant 358 0 - 424 ng/ml (FEU)   UA w Reflex to Microscopic w Reflex to Culture   Result Value Ref Range    Color, UA Light Yellow     Clarity, UA Slightly Cloudy     Specific Gravity, UA <=1 005 1 003 - 1 030    pH, UA 6 5 4 5 - 8 0    Leukocytes, UA Negative Negative    Nitrite, UA Negative Negative    Protein, UA Negative Negative mg/dl    Glucose, UA Negative Negative mg/dl    Ketones, UA Negative Negative mg/dl    Urobilinogen, UA 0 2 0 2, 1 0 E U /dl E U /dl    Bilirubin, UA Negative Negative    Blood, UA Trace-Intact (A) Negative   Urine Microscopic   Result Value Ref Range    RBC, UA 1-2 (A) None Seen, 0-5 /hpf    WBC, UA 2-4 (A) None Seen, 0-5, 5-55, 5-65 /hpf    Epithelial Cells Occasional None Seen, Occasional /hpf    Bacteria, UA Occasional None Seen, Occasional /hpf   POCT pregnancy, urine   Result Value Ref Range    EXT PREG TEST UR (Ref: Negative) negative        All labs reviewed and utilized in the medical decision making process    Radiology:    PE Study with CT Abdomen and Pelvis with contrast   Final Result         1  Limited evaluation for pulmonary embolism  No large central PE identified  If there is persistent clinical concern for pulmonary embolism, consider VQ scan  2   Nonspecific symmetric groundglass opacification of the upper and lower lobes in a pattern raising the question of hypersensitivity pneumonitis  Additional imaging differential considerations include atypical infection, alveolar proteinosis or    hemorrhage due to vasculitis are less likely considerations  Correlation with history is needed  3   Mild prominence of the prevascular and subcarinal lymph nodes, possibly infectious/inflammatory  4  No acute abdominal or pelvic pathology            I personally discussed this study with SAUL JOHNSON on 10/27/2018 at 2:54 PM                      Workstation performed: [id number]             All radiology studies independently viewed by me and interpreted by the radiologist     Procedure    Procedures    CritCare Time      ED Course of Care and Re-Assessments      Medications   azithromycin (ZITHROMAX) 500 mg in sodium chloride 0 9 % 250 mL IVPB (500 mg Intravenous New Bag 10/27/18 1609)   sodium chloride 0 9 % infusion (125 mL/hr Intravenous New Bag 10/27/18 1538)   acetaminophen (TYLENOL) tablet 650 mg (not administered)   oxyCODONE (ROXICODONE) IR tablet 5 mg (not administered)   oxyCODONE (ROXICODONE) immediate release tablet 10 mg (not administered)   ondansetron (ZOFRAN) injection 4 mg (not administered)   azithromycin (ZITHROMAX) 500 mg in sodium chloride 0 9 % 250 mL IVPB (not administered)   cefTRIAXone (ROCEPHIN) IVPB (premix) 1,000 mg (not administered)   sodium chloride 0 9 % bolus 1,000 mL (0 mL Intravenous Stopped 10/27/18 1428)   ketorolac (TORADOL) injection 15 mg (15 mg Intravenous Given 10/27/18 1236)   iohexol (OMNIPAQUE) 350 MG/ML injection (MULTI-DOSE) 100 mL (100 mL Intravenous Given 10/27/18 1406)   cefTRIAXone (ROCEPHIN) IVPB (premix) 1,000 mg (0 mg Intravenous Stopped 10/27/18 1607)           FINAL IMPRESSION    Final diagnoses:   None         DISPOSITION/PLAN    Admit         Gera Dey, DO     Gera Dey,   10/27/18 2975

## 2018-10-28 PROBLEM — R74.02 ELEVATED LDH: Status: ACTIVE | Noted: 2018-10-28

## 2018-10-28 PROBLEM — R93.89 ABNORMAL CT SCAN, CHEST: Status: ACTIVE | Noted: 2018-10-28

## 2018-10-28 PROBLEM — J96.01 ACUTE RESPIRATORY FAILURE WITH HYPOXIA (HCC): Status: ACTIVE | Noted: 2018-10-28

## 2018-10-28 LAB
ALBUMIN SERPL BCP-MCNC: 2.9 G/DL (ref 3.5–5)
ALP SERPL-CCNC: 76 U/L (ref 46–116)
ALT SERPL W P-5'-P-CCNC: 22 U/L (ref 12–78)
ANION GAP SERPL CALCULATED.3IONS-SCNC: 7 MMOL/L (ref 4–13)
AST SERPL W P-5'-P-CCNC: 21 U/L (ref 5–45)
ATRIAL RATE: 113 BPM
BASOPHILS # BLD AUTO: 0.04 THOUSANDS/ΜL (ref 0–0.1)
BASOPHILS NFR BLD AUTO: 0 % (ref 0–1)
BILIRUB SERPL-MCNC: 0.3 MG/DL (ref 0.2–1)
BUN SERPL-MCNC: 5 MG/DL (ref 5–25)
CALCIUM SERPL-MCNC: 8 MG/DL (ref 8.3–10.1)
CHLORIDE SERPL-SCNC: 106 MMOL/L (ref 100–108)
CK MB SERPL-MCNC: 1.1 NG/ML (ref 0–5)
CK MB SERPL-MCNC: <1 % (ref 0–2.5)
CK SERPL-CCNC: 150 U/L (ref 26–192)
CO2 SERPL-SCNC: 28 MMOL/L (ref 21–32)
CREAT SERPL-MCNC: 0.64 MG/DL (ref 0.6–1.3)
EOSINOPHIL # BLD AUTO: 0.11 THOUSAND/ΜL (ref 0–0.61)
EOSINOPHIL NFR BLD AUTO: 1 % (ref 0–6)
ERYTHROCYTE [DISTWIDTH] IN BLOOD BY AUTOMATED COUNT: 12.4 % (ref 11.6–15.1)
FLUAV AG SPEC QL: NORMAL
FLUBV AG SPEC QL: NORMAL
GFR SERPL CREATININE-BSD FRML MDRD: 116 ML/MIN/1.73SQ M
GLUCOSE P FAST SERPL-MCNC: 141 MG/DL (ref 65–99)
GLUCOSE SERPL-MCNC: 141 MG/DL (ref 65–140)
HAV IGM SER QL: NORMAL
HBV CORE IGM SER QL: NORMAL
HBV SURFACE AG SER QL: NORMAL
HCT VFR BLD AUTO: 35.7 % (ref 34.8–46.1)
HCV AB SER QL: NORMAL
HGB BLD-MCNC: 11.5 G/DL (ref 11.5–15.4)
IMM GRANULOCYTES # BLD AUTO: 0.04 THOUSAND/UL (ref 0–0.2)
IMM GRANULOCYTES NFR BLD AUTO: 0 % (ref 0–2)
LDH SERPL-CCNC: 309 U/L (ref 81–234)
LYMPHOCYTES # BLD AUTO: 2.6 THOUSANDS/ΜL (ref 0.6–4.47)
LYMPHOCYTES NFR BLD AUTO: 22 % (ref 14–44)
MAGNESIUM SERPL-MCNC: 2.1 MG/DL (ref 1.6–2.6)
MCH RBC QN AUTO: 28.4 PG (ref 26.8–34.3)
MCHC RBC AUTO-ENTMCNC: 32.2 G/DL (ref 31.4–37.4)
MCV RBC AUTO: 88 FL (ref 82–98)
MONOCYTES # BLD AUTO: 0.83 THOUSAND/ΜL (ref 0.17–1.22)
MONOCYTES NFR BLD AUTO: 7 % (ref 4–12)
NEUTROPHILS # BLD AUTO: 8.02 THOUSANDS/ΜL (ref 1.85–7.62)
NEUTS SEG NFR BLD AUTO: 70 % (ref 43–75)
NRBC BLD AUTO-RTO: 0 /100 WBCS
P AXIS: 28 DEGREES
PHOSPHATE SERPL-MCNC: 3.7 MG/DL (ref 2.7–4.5)
PLATELET # BLD AUTO: 217 THOUSANDS/UL (ref 149–390)
PMV BLD AUTO: 11.9 FL (ref 8.9–12.7)
POTASSIUM SERPL-SCNC: 4 MMOL/L (ref 3.5–5.3)
PR INTERVAL: 134 MS
PROCALCITONIN SERPL-MCNC: 2.24 NG/ML
PROT SERPL-MCNC: 6.2 G/DL (ref 6.4–8.2)
QRS AXIS: 16 DEGREES
QRSD INTERVAL: 96 MS
QT INTERVAL: 326 MS
QTC INTERVAL: 447 MS
RBC # BLD AUTO: 4.05 MILLION/UL (ref 3.81–5.12)
RSV B RNA SPEC QL NAA+PROBE: NORMAL
SODIUM SERPL-SCNC: 141 MMOL/L (ref 136–145)
T WAVE AXIS: 18 DEGREES
TROPONIN I SERPL-MCNC: <0.02 NG/ML
VENTRICULAR RATE: 113 BPM
WBC # BLD AUTO: 11.64 THOUSAND/UL (ref 4.31–10.16)

## 2018-10-28 PROCEDURE — 84100 ASSAY OF PHOSPHORUS: CPT | Performed by: INTERNAL MEDICINE

## 2018-10-28 PROCEDURE — 84145 PROCALCITONIN (PCT): CPT | Performed by: INTERNAL MEDICINE

## 2018-10-28 PROCEDURE — 87070 CULTURE OTHR SPECIMN AEROBIC: CPT | Performed by: INTERNAL MEDICINE

## 2018-10-28 PROCEDURE — 94762 N-INVAS EAR/PLS OXIMTRY CONT: CPT

## 2018-10-28 PROCEDURE — 94640 AIRWAY INHALATION TREATMENT: CPT

## 2018-10-28 PROCEDURE — 94760 N-INVAS EAR/PLS OXIMETRY 1: CPT

## 2018-10-28 PROCEDURE — 83735 ASSAY OF MAGNESIUM: CPT | Performed by: INTERNAL MEDICINE

## 2018-10-28 PROCEDURE — 80074 ACUTE HEPATITIS PANEL: CPT | Performed by: INTERNAL MEDICINE

## 2018-10-28 PROCEDURE — 83615 LACTATE (LD) (LDH) ENZYME: CPT | Performed by: INTERNAL MEDICINE

## 2018-10-28 PROCEDURE — 85025 COMPLETE CBC W/AUTO DIFF WBC: CPT | Performed by: INTERNAL MEDICINE

## 2018-10-28 PROCEDURE — 80053 COMPREHEN METABOLIC PANEL: CPT | Performed by: INTERNAL MEDICINE

## 2018-10-28 PROCEDURE — 84484 ASSAY OF TROPONIN QUANT: CPT | Performed by: INTERNAL MEDICINE

## 2018-10-28 PROCEDURE — 93010 ELECTROCARDIOGRAM REPORT: CPT | Performed by: INTERNAL MEDICINE

## 2018-10-28 PROCEDURE — 87205 SMEAR GRAM STAIN: CPT | Performed by: INTERNAL MEDICINE

## 2018-10-28 PROCEDURE — 99232 SBSQ HOSP IP/OBS MODERATE 35: CPT | Performed by: INTERNAL MEDICINE

## 2018-10-28 PROCEDURE — 82550 ASSAY OF CK (CPK): CPT | Performed by: INTERNAL MEDICINE

## 2018-10-28 PROCEDURE — 87389 HIV-1 AG W/HIV-1&-2 AB AG IA: CPT | Performed by: INTERNAL MEDICINE

## 2018-10-28 PROCEDURE — 82553 CREATINE MB FRACTION: CPT | Performed by: INTERNAL MEDICINE

## 2018-10-28 RX ORDER — BUTALBITAL, ACETAMINOPHEN AND CAFFEINE 50; 325; 40 MG/1; MG/1; MG/1
1 TABLET ORAL ONCE
Status: COMPLETED | OUTPATIENT
Start: 2018-10-28 | End: 2018-10-28

## 2018-10-28 RX ORDER — BUTALBITAL, ACETAMINOPHEN AND CAFFEINE 50; 325; 40 MG/1; MG/1; MG/1
1 TABLET ORAL EVERY 4 HOURS PRN
Status: DISCONTINUED | OUTPATIENT
Start: 2018-10-28 | End: 2018-10-29

## 2018-10-28 RX ADMIN — ISODIUM CHLORIDE 3 ML: 0.03 SOLUTION RESPIRATORY (INHALATION) at 20:52

## 2018-10-28 RX ADMIN — OXYCODONE HYDROCHLORIDE 10 MG: 10 TABLET ORAL at 08:53

## 2018-10-28 RX ADMIN — LEVALBUTEROL 1.25 MG: 1.25 SOLUTION, CONCENTRATE RESPIRATORY (INHALATION) at 14:23

## 2018-10-28 RX ADMIN — GABAPENTIN 300 MG: 300 CAPSULE ORAL at 21:22

## 2018-10-28 RX ADMIN — ISODIUM CHLORIDE 3 ML: 0.03 SOLUTION RESPIRATORY (INHALATION) at 08:47

## 2018-10-28 RX ADMIN — GABAPENTIN 300 MG: 300 CAPSULE ORAL at 16:46

## 2018-10-28 RX ADMIN — BUTALBITAL, ACETAMINOPHEN, AND CAFFEINE 1 TABLET: 50; 325; 40 TABLET ORAL at 07:12

## 2018-10-28 RX ADMIN — CEFTRIAXONE 1000 MG: 1 INJECTION, SOLUTION INTRAVENOUS at 15:02

## 2018-10-28 RX ADMIN — BUTALBITAL, ACETAMINOPHEN, AND CAFFEINE 1 TABLET: 50; 325; 40 TABLET ORAL at 22:07

## 2018-10-28 RX ADMIN — SODIUM CHLORIDE 125 ML/HR: 0.9 INJECTION, SOLUTION INTRAVENOUS at 01:13

## 2018-10-28 RX ADMIN — OXYCODONE HYDROCHLORIDE 5 MG: 5 TABLET ORAL at 23:36

## 2018-10-28 RX ADMIN — SODIUM CHLORIDE 125 ML/HR: 0.9 INJECTION, SOLUTION INTRAVENOUS at 08:57

## 2018-10-28 RX ADMIN — MORPHINE SULFATE 2 MG: 2 INJECTION, SOLUTION INTRAMUSCULAR; INTRAVENOUS at 21:23

## 2018-10-28 RX ADMIN — LEVALBUTEROL 1.25 MG: 1.25 SOLUTION, CONCENTRATE RESPIRATORY (INHALATION) at 20:52

## 2018-10-28 RX ADMIN — GABAPENTIN 300 MG: 300 CAPSULE ORAL at 08:47

## 2018-10-28 RX ADMIN — LEVALBUTEROL 1.25 MG: 1.25 SOLUTION, CONCENTRATE RESPIRATORY (INHALATION) at 08:48

## 2018-10-28 RX ADMIN — AZITHROMYCIN MONOHYDRATE 500 MG: 500 INJECTION, POWDER, LYOPHILIZED, FOR SOLUTION INTRAVENOUS at 16:54

## 2018-10-28 RX ADMIN — MORPHINE SULFATE 2 MG: 2 INJECTION, SOLUTION INTRAMUSCULAR; INTRAVENOUS at 16:52

## 2018-10-28 RX ADMIN — OXYCODONE HYDROCHLORIDE 10 MG: 10 TABLET ORAL at 04:44

## 2018-10-28 RX ADMIN — ISODIUM CHLORIDE 3 ML: 0.03 SOLUTION RESPIRATORY (INHALATION) at 14:23

## 2018-10-28 RX ADMIN — MORPHINE SULFATE 2 MG: 2 INJECTION, SOLUTION INTRAMUSCULAR; INTRAVENOUS at 12:48

## 2018-10-28 NOTE — UTILIZATION REVIEW
Initial Clinical Review    Admission: Date/Time/Statement: OBSERVATION 10/27/18 @ 1510 CHANGED TO INPATIENT ON 10/28/18 @ 0827    Orders Placed This Encounter   Procedures     10/28/18 0828  Inpatient Admission Once     Transfer Service: General Medicine       Question Answer Comment   Admitting Physician Randall Villalobos    Level of Care Med Surg    Estimated length of stay More than 2 Midnights    Certification I certify that inpatient services are medically necessary for this patient for a duration of greater than two midnights  See H&P and MD Progress Notes for additional information about the patient's course of treatment  10/28/18 0827     ED: Date/Time/Mode of Arrival:   ED Arrival Information     Expected Arrival Acuity Means of Arrival Escorted By Service Admission Type    - 10/27/2018 12:01 Emergent Walk-In Family Member General Medicine Emergency    Arrival Complaint    shortness of breath        Chief Complaint:   Chief Complaint   Patient presents with    Shortness of Breath     Shortness of breath since yesterday  States she's been having episodes of coughing which caused her to vomitted  Tried to go to work and was unable to climb stairs, became very short of breath and started coughing again  Substernal chest pain and pain behind shoulder blades  History of Illness: Elvi Guaman is a 26 y o  female who presents to the emergency department with the complaint of shortness of breath  The shortness of breath has been persistent over last 24 hours and has progressively worsened in nature  The shortness of breath is worsened with any type of exertion or physical activity  Nothing seemed to relieve the shortness of breath  She also complains of a non-productive cough as well as chest pain with deep inspiration  In addition, the patient has been experiencing nausea, vomiting, and generalized weakness  No fevers  No abdominal pain  She does complain of chills  No diarrhea    She was seen in the emergency department on 10/24/2018 for lower thoracic back pain and left flank pain  The patient continues to experience left-sided thoracic back pain and left flank pain      ED Vital Signs:   ED Triage Vitals [10/27/18 1212]   Temperature Pulse Respirations Blood Pressure SpO2   99 1 °F (37 3 °C) (!) 112 20 129/71 94 %      Temp Source Heart Rate Source Patient Position - Orthostatic VS BP Location FiO2 (%)   Temporal Monitor Lying Right arm --      Pain Score       8        Wt Readings from Last 1 Encounters:   10/27/18 108 kg (238 lb 15 7 oz)     Vital Signs (abnormal):   10/27/18 2309  98 6 °F (37 °C)   110  18  118/56  81  90 %  Nasal cannula   10/27/18 1212  99 1 °F (37 3 °C)   112  20  129/71  --  94 %  None (Room air)     Abnormal Labs:    GLUCOSE 141  Álvaro 8 0    Trop WNL   WBC 25 61, 11 64  Urine trace blood, RBC 1-2, WBC 2-4  procalcitonin 2 87    Diagnostic Test Results:   10/27 CTA Chest, CT abd, pelvis - 1  Limited evaluation for pulmonary embolism  No large central PE identified  If there is persistent clinical concern for pulmonary embolism, consider VQ scan  2   Nonspecific symmetric groundglass opacification of the upper and lower lobes in a pattern raising the question of hypersensitivity pneumonitis  Additional imaging differential considerations include atypical infection, alveolar proteinosis or hemorrhage due to vasculitis are less likely considerations  Correlation with history is needed  3   Mild prominence of the prevascular and subcarinal lymph nodes, possibly infectious/inflammatory  4  No acute abdominal or pelvic pathology      ED Treatment:   Medication Administration from 10/27/2018 1201 to 10/27/2018 1624    Date/Time Order Dose Route Action   10/27/2018 1231 sodium chloride 0 9 % bolus 1,000 mL 1,000 mL Intravenous New Bag   10/27/2018 1236 ketorolac (TORADOL) injection 15 mg 15 mg Intravenous Given   10/27/2018 1406 iohexol (OMNIPAQUE) 350 MG/ML injection (MULTI-DOSE) 100 mL 100 mL Intravenous Given   10/27/2018 1538 cefTRIAXone (ROCEPHIN) IVPB (premix) 1,000 mg 1,000 mg Intravenous New Bag   10/27/2018 1609 azithromycin (ZITHROMAX) 500 mg in sodium chloride 0 9 % 250 mL IVPB 500 mg Intravenous New Bag   10/27/2018 1538 sodium chloride 0 9 % infusion 125 mL/hr Intravenous New Bag        Past Medical/Surgical History:    Active Ambulatory Problems     Diagnosis Date Noted    Cerebral cyst 03/01/2017    Osteoarthritis of spine with radiculopathy, cervical region 08/07/2018     Resolved Ambulatory Problems     Diagnosis Date Noted    No Resolved Ambulatory Problems     Past Medical History:   Diagnosis Date    Anxiety     Arachnoid cyst     Chronic pain     Depression     Hypertension     Migraine      Admitting Diagnosis: Shortness of breath [R06 02]  Leukocytosis [D72 829]  Dyspnea [R06 00]    Age/Sex: 26 y o  female    Assessment/Plan:       * Sepsis ([facility] [address] )   Assessment & Plan     · The sepsis is present on admission and secondary to community-acquired pneumonia  · Check blood cultures x 2 sets  · Check a urine culture  · Check a lactic acid level  · Check and follow the procalcitonin level  · Normal saline IV fluids  · Check influenza/RSV PCR testing  · IV ceftriaxone and IV azithromycin will be utilized to treat community-acquired pneumonia pathogens      Community acquired pneumonia   Assessment & Plan     · Check influenza/RSV testing  · Check blood cultures x 2 sets  · Check and follow the procalcitonin level  · Check a sputum culture  · IV ceftriaxone and IV azithromycin will be utilized to treat the community-acquired pneumonia  · Initiate the respiratory protocol and airway clearance protocol      Lymphadenopathy   Assessment & Plan     · Subcarinal lymphadenopathy found on CT scan of the chest  · Likely reactive in nature  · Check an LDH  · She will need outpatient surveillance imaging to ensure resolution of the abnormal findings found on CT scan of the chest      Tobacco abuse   Assessment & Plan     · Nicotine patch  · Smoking cessation counseling         VTE Prophylaxis: Enoxaparin (Lovenox)  / sequential compression device   Code Status:  Level 1-Full Code  Anticipated Length of Stay:  Patient will be admitted on an Observation basis with an anticipated length of stay of less than 2 midnights  Justification for Hospital Stay:  The patient requires IV antibiotics, continuous IV fluids, and a work-up for sepsis  Admission Orders:  Scheduled Meds:   Current Facility-Administered Medications:  acetaminophen 650 mg Oral Q6H PRN    albuterol 2 5 mg Nebulization Q6H PRN    azithromycin 500 mg Intravenous Q24H    cefTRIAXone 1,000 mg Intravenous Q24H    enoxaparin 40 mg Subcutaneous Q24H    gabapentin 300 mg Oral TID    levalbuterol 1 25 mg Nebulization TID    nicotine 1 patch Transdermal Daily    ondansetron 4 mg Intravenous Q4H PRN    oxyCODONE 10 mg Oral Q4H PRN    oxyCODONE 5 mg Oral Q4H PRN    sodium chloride 125 mL/hr Intravenous Continuous Last Rate: Stopped (10/28/18 0715)   sodium chloride 3 mL Nebulization TID      Continuous Infusions:   sodium chloride 125 mL/hr Last Rate: Stopped (10/28/18 0715)     PRN Meds:   acetaminophen    albuterol    Ondansetron x1    oxyCODONE 10 mg x 1    oxyCODONE 5 mg x 1    Tele   SCDs  Up w/ assist   Hep panel   HIV urine and sputum cultures   Flu swab  Diet regular  Oscillatory positive expiratory pressure 3x daily       Thank you,  [facility]  Utilization Review Department  Phone: [phone]; Fax [phone]  ATTENTION: Please call with any questions or concerns to [phone]  and carefully follow the prompts so that you are directed to the right person  Send all requests for admission clinical reviews, approved or denied determinations and any other requests to fax [phone]   All voicemails are confidential

## 2018-10-28 NOTE — ASSESSMENT & PLAN NOTE
· Check influenza/RSV testing  · Check blood cultures x 2 sets  Results for Boris Hobson (MRN [id number]) as of 10/28/2018 08:40   Ref   Range 10/27/2018 15:27   Procalcitonin Latest Ref Range: <=0 25 ng/ml 2 87 (H)     · Follow the procalcitonin level  · Check a sputum culture  · Continue IV ceftriaxone and IV azithromycin to treat the community-acquired pneumonia  · Continue the respiratory protocol and airway clearance protocol

## 2018-10-28 NOTE — ASSESSMENT & PLAN NOTE
· Supplemental oxygen to maintain oxygen saturation levels at 92% and above  · Respiratory protocol and airway clearance protocol  · Initiate continuous pulse oximetry

## 2018-10-28 NOTE — ASSESSMENT & PLAN NOTE
· The sepsis was present on admission and secondary to community-acquired pneumonia  · Check blood cultures x 2 sets  · Check a urine culture  · The lactic acid level is within normal limits  Results for Luann Becerril (MRN [id number]) as of 10/28/2018 08:40   Ref   Range 10/27/2018 15:27   Procalcitonin Latest Ref Range: <=0 25 ng/ml 2 87 (H)     · Follow the procalcitonin level  · Continue normal saline IV fluids  · Check influenza/RSV PCR testing  · Continue IV ceftriaxone and IV azithromycin to treat community-acquired pneumonia pathogens

## 2018-10-28 NOTE — ASSESSMENT & PLAN NOTE
· Subcarinal lymphadenopathy found on CT scan of the chest  · Likely reactive in nature  · She will need outpatient surveillance imaging to ensure resolution of the abnormal findings found on CT scan of the chest  Results for Nelli Esquivel (MRN [id number]) as of 10/28/2018 08:40   Ref   Range 10/28/2018 04:41   LD (LDH) Latest Ref Range: 81 - 234 U/L 309 (H)     · Consult Pulmonology

## 2018-10-28 NOTE — SOCIAL WORK
Cm met with the patient to evaluate the patients prior function and living situation and any barriers to d/c and form a safe d/c plan  Cm also evaluated the patient for any services in the home or needs for services  Pt resides at home with her spouse and children in a house  Has 6 LUC[address] then flight of stairs to her bedroom/bathroom  Pt is independent with her adls and ambulation  No services or DME  Hx of OP PT  Pt drives  PCP is Lilian Oshea and pharmacy is [facility]  Pt plans home on dc with outpatient follow up  Cm will continue to follow and assist in dc planning

## 2018-10-29 PROBLEM — R19.7 DIARRHEA: Status: ACTIVE | Noted: 2018-10-29

## 2018-10-29 PROBLEM — R73.9 HYPERGLYCEMIA: Status: ACTIVE | Noted: 2018-10-29

## 2018-10-29 PROBLEM — G93.0 ARACHNOID CYST: Status: ACTIVE | Noted: 2018-10-29

## 2018-10-29 LAB
ALBUMIN SERPL BCP-MCNC: 2.9 G/DL (ref 3.5–5)
ALP SERPL-CCNC: 76 U/L (ref 46–116)
ALT SERPL W P-5'-P-CCNC: 22 U/L (ref 12–78)
ANION GAP SERPL CALCULATED.3IONS-SCNC: 9 MMOL/L (ref 4–13)
AST SERPL W P-5'-P-CCNC: 11 U/L (ref 5–45)
BASOPHILS # BLD AUTO: 0.03 THOUSANDS/ΜL (ref 0–0.1)
BASOPHILS NFR BLD AUTO: 0 % (ref 0–1)
BILIRUB SERPL-MCNC: 0.3 MG/DL (ref 0.2–1)
BUN SERPL-MCNC: 4 MG/DL (ref 5–25)
CALCIUM SERPL-MCNC: 8.1 MG/DL (ref 8.3–10.1)
CHLORIDE SERPL-SCNC: 107 MMOL/L (ref 100–108)
CO2 SERPL-SCNC: 23 MMOL/L (ref 21–32)
CREAT SERPL-MCNC: 0.58 MG/DL (ref 0.6–1.3)
EOSINOPHIL # BLD AUTO: 0.15 THOUSAND/ΜL (ref 0–0.61)
EOSINOPHIL NFR BLD AUTO: 2 % (ref 0–6)
ERYTHROCYTE [DISTWIDTH] IN BLOOD BY AUTOMATED COUNT: 12.5 % (ref 11.6–15.1)
GFR SERPL CREATININE-BSD FRML MDRD: 120 ML/MIN/1.73SQ M
GLUCOSE SERPL-MCNC: 128 MG/DL (ref 65–140)
HCT VFR BLD AUTO: 33.9 % (ref 34.8–46.1)
HGB BLD-MCNC: 10.8 G/DL (ref 11.5–15.4)
HIV 1+2 AB+HIV1 P24 AG SERPL QL IA: NORMAL
IMM GRANULOCYTES # BLD AUTO: 0.05 THOUSAND/UL (ref 0–0.2)
IMM GRANULOCYTES NFR BLD AUTO: 1 % (ref 0–2)
LDH SERPL-CCNC: 251 U/L (ref 81–234)
LYMPHOCYTES # BLD AUTO: 3.21 THOUSANDS/ΜL (ref 0.6–4.47)
LYMPHOCYTES NFR BLD AUTO: 31 % (ref 14–44)
MAGNESIUM SERPL-MCNC: 2 MG/DL (ref 1.6–2.6)
MCH RBC QN AUTO: 28.1 PG (ref 26.8–34.3)
MCHC RBC AUTO-ENTMCNC: 31.9 G/DL (ref 31.4–37.4)
MCV RBC AUTO: 88 FL (ref 82–98)
MONOCYTES # BLD AUTO: 0.74 THOUSAND/ΜL (ref 0.17–1.22)
MONOCYTES NFR BLD AUTO: 7 % (ref 4–12)
MRSA NOSE QL CULT: NORMAL
NEUTROPHILS # BLD AUTO: 6.09 THOUSANDS/ΜL (ref 1.85–7.62)
NEUTS SEG NFR BLD AUTO: 59 % (ref 43–75)
NRBC BLD AUTO-RTO: 0 /100 WBCS
PHOSPHATE SERPL-MCNC: 4 MG/DL (ref 2.7–4.5)
PLATELET # BLD AUTO: 215 THOUSANDS/UL (ref 149–390)
PMV BLD AUTO: 11.4 FL (ref 8.9–12.7)
POTASSIUM SERPL-SCNC: 4 MMOL/L (ref 3.5–5.3)
PROCALCITONIN SERPL-MCNC: 1.23 NG/ML
PROT SERPL-MCNC: 6.4 G/DL (ref 6.4–8.2)
RBC # BLD AUTO: 3.84 MILLION/UL (ref 3.81–5.12)
SODIUM SERPL-SCNC: 139 MMOL/L (ref 136–145)
WBC # BLD AUTO: 10.27 THOUSAND/UL (ref 4.31–10.16)

## 2018-10-29 PROCEDURE — 83615 LACTATE (LD) (LDH) ENZYME: CPT | Performed by: INTERNAL MEDICINE

## 2018-10-29 PROCEDURE — 99232 SBSQ HOSP IP/OBS MODERATE 35: CPT | Performed by: INTERNAL MEDICINE

## 2018-10-29 PROCEDURE — 84100 ASSAY OF PHOSPHORUS: CPT | Performed by: INTERNAL MEDICINE

## 2018-10-29 PROCEDURE — 80053 COMPREHEN METABOLIC PANEL: CPT | Performed by: INTERNAL MEDICINE

## 2018-10-29 PROCEDURE — 85025 COMPLETE CBC W/AUTO DIFF WBC: CPT | Performed by: INTERNAL MEDICINE

## 2018-10-29 PROCEDURE — 83735 ASSAY OF MAGNESIUM: CPT | Performed by: INTERNAL MEDICINE

## 2018-10-29 PROCEDURE — 94760 N-INVAS EAR/PLS OXIMETRY 1: CPT

## 2018-10-29 PROCEDURE — 94761 N-INVAS EAR/PLS OXIMETRY MLT: CPT

## 2018-10-29 PROCEDURE — 84145 PROCALCITONIN (PCT): CPT | Performed by: INTERNAL MEDICINE

## 2018-10-29 PROCEDURE — 94640 AIRWAY INHALATION TREATMENT: CPT

## 2018-10-29 PROCEDURE — 99254 IP/OBS CNSLTJ NEW/EST MOD 60: CPT | Performed by: INTERNAL MEDICINE

## 2018-10-29 RX ORDER — CYCLOBENZAPRINE HCL 10 MG
5 TABLET ORAL 3 TIMES DAILY
Status: DISCONTINUED | OUTPATIENT
Start: 2018-10-29 | End: 2018-10-30 | Stop reason: HOSPADM

## 2018-10-29 RX ADMIN — ISODIUM CHLORIDE 3 ML: 0.03 SOLUTION RESPIRATORY (INHALATION) at 14:31

## 2018-10-29 RX ADMIN — SODIUM CHLORIDE 125 ML/HR: 0.9 INJECTION, SOLUTION INTRAVENOUS at 04:13

## 2018-10-29 RX ADMIN — LEVALBUTEROL 1.25 MG: 1.25 SOLUTION, CONCENTRATE RESPIRATORY (INHALATION) at 14:31

## 2018-10-29 RX ADMIN — ISODIUM CHLORIDE 3 ML: 0.03 SOLUTION RESPIRATORY (INHALATION) at 07:52

## 2018-10-29 RX ADMIN — LEVALBUTEROL 1.25 MG: 1.25 SOLUTION, CONCENTRATE RESPIRATORY (INHALATION) at 07:52

## 2018-10-29 RX ADMIN — AZITHROMYCIN MONOHYDRATE 500 MG: 500 INJECTION, POWDER, LYOPHILIZED, FOR SOLUTION INTRAVENOUS at 16:34

## 2018-10-29 RX ADMIN — CYCLOBENZAPRINE HYDROCHLORIDE 5 MG: 10 TABLET, FILM COATED ORAL at 20:00

## 2018-10-29 RX ADMIN — CYCLOBENZAPRINE HYDROCHLORIDE 5 MG: 10 TABLET, FILM COATED ORAL at 15:06

## 2018-10-29 RX ADMIN — SODIUM CHLORIDE 75 ML/HR: 0.9 INJECTION, SOLUTION INTRAVENOUS at 13:08

## 2018-10-29 RX ADMIN — GABAPENTIN 300 MG: 300 CAPSULE ORAL at 09:35

## 2018-10-29 RX ADMIN — CEFTRIAXONE 1000 MG: 1 INJECTION, SOLUTION INTRAVENOUS at 15:45

## 2018-10-29 RX ADMIN — GABAPENTIN 300 MG: 300 CAPSULE ORAL at 20:00

## 2018-10-29 RX ADMIN — GABAPENTIN 300 MG: 300 CAPSULE ORAL at 15:45

## 2018-10-29 RX ADMIN — ACETAMINOPHEN 650 MG: 325 TABLET, FILM COATED ORAL at 02:20

## 2018-10-29 RX ADMIN — OXYCODONE HYDROCHLORIDE 5 MG: 5 TABLET ORAL at 13:10

## 2018-10-29 NOTE — RESPIRATORY THERAPY NOTE
Home Oxygen Qualifying Test       Patient name: Judie Wasserman        : 1985   Date of Test:  2018  Diagnosis:      Home Oxygen Test:    **Medicare Guidelines require item(s) 1-5 on all ambulatory patients or 1 and 2 on non-ambulatory patients  1   Baseline SPO2 on Room Air at rest 96 %  2   SPO2 during exercise on Room Air 99   %  During exercise monitor SpO2  If SPO2 increases >=89% with ambulation do not add supplemental             oxygen  If <= 88% on room air add O2 via NC and titrate patient  Patient must be ambulated with O2 and titrated to > 88% with exertion  3   SPO2 on Oxygen at rest N/A % N/A lpm     4   SPO2 during exercise on Oxygen  N/A% a liter flow of N/A lpm     5   Exercise performed:          walking, duration 15 (min), distance 175 (feet)          []  Supplemental Home Oxygen is indicated  [x]  Client does not qualify for home oxygen        Respiratory Additional Notes- N/A    January Burt, RT

## 2018-10-29 NOTE — CONSULTS
Pulmonary Consultation   Mai Byrnes 26 y o  female MRN: [id number]  Unit/Bed#: 401-01 Encounter: [id number]      Reason for consultation:  Abnormal chest CT    Requesting physician: Dr Freddy Johnson    Impressions/Recommendations:     · Abnormal chest CT with bilateral ground-glass opacities and mild mediastinal adenopathy - this is a nonspecific finding and has a fairly broad differential diagnosis, including pneumonitis (hypersensitivity, eosinophilic, inflammatory, infectious),  alveolar hemorrhage, early interstitial lung disease and edema  It is a somewhat atypical appearance for community-acquired pneumonia, however she has demonstrated symptomatic improvement with broad-spectrum antibiotics  Procalcitonin is improving  Adenopathy is likely reactive in nature  I would complete a 7 day course of antibiotics - if she will be going home, would complete with Levaquin so that she has adequate atypical coverage  Repeat chest CT in 6-8 weeks  If findings are persistent, may need to consider bronchoscopy  HIV is pending  If positive, Pneumocystis would be an additional consideration (though would have expected LDH to be somewhat higher) and bronchoscopy would be indicated prior to repeating CT    · Ongoing tobacco abuse - we discussed the importance of smoking cessation  She verbalizes understanding  She declines nicotine patch  History of Present Illness   HPI:  Mai Byrnes is a 26 y o  female who was admitted on 10/27/18 with complaints of back pain  Also with associated shortness of breath and difficulty taking a deep breath  She reports scant cough without hemoptysis  She denies fever, chills or sweats  CT of the chest was performed and showed diffuse bilateral ground-glass opacities  She was started on broad-spectrum antibiotics  Since admission, she is feeling better  She is now able to take a deep breath and denies any further chest tightness  No significant wheezing    She denies history of lung disease including asthma or emphysema  She has been working at [employer] for the past 2 months and does report that it is a somewhat kelin environment  She has a cat at home but has no allergy to it  Review of systems:  Patient denies headache or vision changes  Weight is stable  Denies sore throat or runny nose  Denies fever, chills or sweats  Denies chest pain or palpitations  Denies nausea, vomiting or diarrhea  Occasional lower extremity edema  Denies skin rashes  Denies dysuria or hematuria  All other 12-point review of systems are negative  Historical Information   Past Medical History:   Diagnosis Date    Anxiety     Arachnoid cyst     Chronic pain     back    Depression     Hypertension     Migraine      Past Surgical History:   Procedure Laterality Date     SECTION      CHOLECYSTECTOMY      CRANIOTOMY Right 3/1/2017    Procedure: RIGHT IMAGE GUIDED RETROMASTOID CRANIOTOMY FOR FENESTRATION AND PLACEMENT OF INTERNAL CYSTO-SUBARACHNOID SHUNT;  Surgeon: Ashley Montero MD;  Location: [facility];  Service:    [facility] MOUTH SURGERY      WISDOM TOOTH EXTRACTION       History reviewed  No pertinent family history      Tobacco history:  She has been smoking half pack per day since age 14    Family history:  Negative from pulmonary standpoint    Meds/Allergies   Current Facility-Administered Medications   Medication Dose Route Frequency    acetaminophen (TYLENOL) tablet 650 mg  650 mg Oral Q6H PRN    albuterol inhalation solution 2 5 mg  2 5 mg Nebulization Q6H PRN    azithromycin (ZITHROMAX) 500 mg in sodium chloride 0 9 % 250 mL IVPB  500 mg Intravenous Q24H    butalbital-acetaminophen-caffeine (FIORICET,ESGIC) -40 mg per tablet 1 tablet  1 tablet Oral Q4H PRN    cefTRIAXone (ROCEPHIN) IVPB (premix) 1,000 mg  1,000 mg Intravenous Q24H    enoxaparin (LOVENOX) subcutaneous injection 40 mg  40 mg Subcutaneous Q24H    gabapentin (NEURONTIN) capsule 300 mg  300 mg Oral TID    levalbuterol (XOPENEX) inhalation solution 1 25 mg  1 25 mg Nebulization TID    morphine injection 2 mg  2 mg Intravenous Q3H PRN    nicotine (NICODERM CQ) 7 mg/24hr TD 24 hr patch 1 patch  1 patch Transdermal Daily    ondansetron (ZOFRAN) injection 4 mg  4 mg Intravenous Q4H PRN    oxyCODONE (ROXICODONE) IR tablet 5 mg  5 mg Oral Q4H PRN    sodium chloride 0 9 % infusion  125 mL/hr Intravenous Continuous    sodium chloride 0 9 % inhalation solution 3 mL  3 mL Nebulization TID     No Known Allergies    Vitals: Blood pressure 130/76, pulse 90, temperature 98 4 °F (36 9 °C), temperature source Temporal, resp  rate 18, height 5' 7" (1 702 m), weight 108 kg (238 lb 15 7 oz), SpO2 99 % , room air, Body mass index is 37 43 kg/m²      Intake/Output Summary (Last 24 hours) at 10/29/18 1124  Last data filed at 10/29/18 0900   Gross per 24 hour   Intake          1530 42 ml   Output                0 ml   Net          1530 42 ml       Physical exam:    General Appearance:    Alert, cooperative, no conversational dyspnea or   accessory muscle use       Head/eyes:    Normocephalic, without obvious abnormality, atraumatic,         PERRL, extraocular muscles intact, no scleral icterus    Nose:   Nares normal, septum midline, mucosa normal, no drainage    or sinus tenderness   Throat:   Moist mucous membranes, no thrush   Neck:   Supple, trachea midline, no adenopathy; no carotid    bruit or JVD   Lungs:     Clear   Chest Wall:    No tenderness or deformity    Heart:    Regular rate and rhythm, S1 and S2 normal, no murmur, rub   or gallop   Abdomen:     Soft, non-tender, bowel sounds active all four quadrants,     no masses, no organomegaly   Extremities:   Extremities normal, atraumatic, no cyanosis, trace LE edema   Skin:   Warm, dry, turgor normal, no rashes or lesions   Lymph nodes:   Cervical and supraclavicular nodes normal   Neurologic:   CNII-XII intact, normal strength, non-focal     Labs: I have personally reviewed pertinent lab results  Results from last 7 days  Lab Units 10/29/18  0555 10/28/18  0441 10/27/18  1231   WBC Thousand/uL 10 27* 11 64* 25 61*   HEMOGLOBIN g/dL 10 8* 11 5 13 2   HEMATOCRIT % 33 9* 35 7 40 0   PLATELETS Thousands/uL 215 217 246           Results from last 7 days  Lab Units 10/29/18  0555 10/28/18  0441 10/27/18  1231   SODIUM mmol/L 139 141 137   POTASSIUM mmol/L 4 0 4 0 3 9   CHLORIDE mmol/L 107 106 103   CO2 mmol/L 23 28 25   BUN mg/dL 4* 5 8   CREATININE mg/dL 0 58* 0 64 0 71   CALCIUM mg/dL 8 1* 8 0* 8 9   ALK PHOS U/L 76 76 88   ALT U/L 22 22 25   AST U/L 11 21 21       Results from last 7 days  Lab Units 10/27/18  1231   INR  1 11   PTT seconds 33       Results from last 7 days  Lab Units 10/29/18  0555 10/28/18  0441   MAGNESIUM mg/dL 2 0 2 1     D-dimer 358  Flu PCR negative  /309    Imaging and other studies: I have personally reviewed pertinent reports  and I have personally reviewed pertinent films in PACS chest CT from 10/27/18 shows no large central pulmonary embolism  There is nonspecific symmetric lung gap is opacity and bilateral lungs  There is mild prominence of prevascular and subcarinal lymph nodes  Code Status: Level 1 - Full Code    Thank you for allowing us to participate in the care of your patient      Kelly Medrano DO

## 2018-10-29 NOTE — PROGRESS NOTES
Progress Note Cheyenne Baker 1985, 26 y o  female MRN: [id number]    Unit/Bed#: 401-01 Encounter: [id number]    Primary Care Provider: Ethan Stevenson DO   Date and time admitted to hospital: 10/27/2018 12:05 PM        * Sepsis [facility])   Assessment & Plan    · The sepsis was present on admission and secondary to probable community-acquired pneumonia  · Follow the culture results  · The lactic acid level is within normal limits  Results for Julee Gerardo (MRN [id number]) as of 10/29/2018 12:29   Ref  Range 10/27/2018 15:27 10/27/2018 15:28 10/27/2018 15:34 10/28/2018 04:41   Procalcitonin Latest Ref Range: <=0 25 ng/ml 2 87 (H)   2 24 (H)     · Follow the procalcitonin level  · Continue normal saline IV fluids  · Continue IV ceftriaxone and IV azithromycin to treat community-acquired pneumonia pathogens     Community acquired pneumonia   Assessment & Plan    · Follow culture results  Results for Julee Gerardo (MRN [id number]) as of 10/29/2018 12:29   Ref   Range 10/27/2018 15:27 10/27/2018 15:28 10/27/2018 15:34 10/28/2018 04:41   Procalcitonin Latest Ref Range: <=0 25 ng/ml 2 87 (H)   2 24 (H)     · Follow the procalcitonin level  · Check a sputum culture  · Continue IV ceftriaxone and IV azithromycin to treat the community-acquired pneumonia  · Continue the respiratory protocol and airway clearance protocol     Hyperglycemia   Assessment & Plan    · Check an HgbA1c level  · Outpatient follow-up with her PCP in regards to this matter     Elevated LDH   Assessment & Plan    · In the setting of lymphadenopathy  · Continue to monitor     Abnormal CT scan, chest   Assessment & Plan    · Consult Pulmonology  · Check an HIV antibody/antigen profile  · She will need outpatient surveillance imaging to ensure resolution of the abnormal  · She may need a bronchoscopy     Acute respiratory failure with hypoxia (HCC)   Assessment & Plan    · Supplemental oxygen to maintain oxygen saturation levels at 92% and above  · Respiratory protocol and airway clearance protocol  · Maintain continuous pulse oximetry  · Check the patient's pulse oximetry on room air with ambulation     Lymphadenopathy   Assessment & Plan    · Subcarinal lymphadenopathy found on CT scan of the chest  · Likely reactive in nature  · She will need outpatient surveillance imaging to ensure resolution of the abnormal findings found on CT scan of the chest  Results for Clay Barcenas (MRN [id number]) as of 10/28/2018 08:40   Ref  Range 10/28/2018 04:41   LD (LDH) Latest Ref Range: 81 - 234 U/L 309 (H)     · Consult Pulmonology     Tobacco abuse   Assessment & Plan    · Nicotine patch  · Smoking cessation counseling         VTE Pharmacologic Prophylaxis:   Pharmacologic: Enoxaparin (Lovenox)  Mechanical VTE Prophylaxis in Place: Yes    Patient Centered Rounds: I have performed bedside rounds with nursing staff today  Time Spent for Care: 20 minutes  More than 50% of total time spent on counseling and coordination of care as described above  Current Length of Stay: 1 day(s)    Current Patient Status: Inpatient   Certification Statement: The patient will continue to require additional inpatient hospital stay due to the need for IV antibiotics and close respiratory status monitoring  Code Status: Level 1 - Full Code      Subjective: The patient was seen and examined  The patient's shortness of breath is somewhat improved  She continues to complain of severe thoracic back pain  Objective:     Vitals:   Temp (24hrs), Av 4 °F (36 9 °C), Min:97 7 °F (36 5 °C), Max:99 °F (37 2 °C)    Temp:  [97 7 °F (36 5 °C)-99 °F (37 2 °C)] 98 4 °F (36 9 °C)  HR:  [] 90  Resp:  [16-18] 18  BP: (109-132)/(57-78) 130/76  SpO2:  [94 %-99 %] 99 %  Body mass index is 37 43 kg/m²  Input and Output Summary (last 24 hours):        Intake/Output Summary (Last 24 hours) at 10/29/18 1240  Last data filed at 10/29/18 1226   Gross per 24 hour   Intake 1470 42 ml   Output                0 ml   Net          1470 42 ml       Physical Exam:     Physical Exam  General:  NAD, awake, alert, oriented x 3, conversational dyspnea is present  HEENT:  NC/AT, mucous membranes moist  Neck:  Supple, No JVP elevation  CV:  + S1, + S2, RRR  Pulm:  Bibasilar crackles  Abd:  Soft, Non-tender, Non-distended  Ext:  No clubbing/cyanosis/edema  Skin:  No rashes      Additional Data:     Labs:      Results from last 7 days  Lab Units 10/29/18  0555   WBC Thousand/uL 10 27*   HEMOGLOBIN g/dL 10 8*   HEMATOCRIT % 33 9*   PLATELETS Thousands/uL 215   NEUTROS PCT % 59   LYMPHS PCT % 31   MONOS PCT % 7   EOS PCT % 2       Results from last 7 days  Lab Units 10/29/18  0555   SODIUM mmol/L 139   POTASSIUM mmol/L 4 0   CHLORIDE mmol/L 107   CO2 mmol/L 23   BUN mg/dL 4*   CREATININE mg/dL 0 58*   ANION GAP mmol/L 9   CALCIUM mg/dL 8 1*   ALBUMIN g/dL 2 9*   TOTAL BILIRUBIN mg/dL 0 30   ALK PHOS U/L 76   ALT U/L 22   AST U/L 11       Results from last 7 days  Lab Units 10/27/18  1231   INR  1 11               Results from last 7 days  Lab Units 10/28/18  0441 10/27/18  1527   LACTIC ACID mmol/L  --  1 2   PROCALCITONIN ng/ml 2 24* 2 87*           * I Have Reviewed All Lab Data Listed Above  * Additional Pertinent Lab Tests Reviewed: Rylie 64 Admission Reviewed        Recent Cultures (last 7 days):       Results from last 7 days  Lab Units 10/28/18  1219 10/27/18  1534 10/27/18  1528 10/27/18  1527 10/27/18  1522   BLOOD CULTURE   --   --  No Growth at 24 hrs   No Growth at 24 hrs   --    GRAM STAIN RESULT  2+ Epithelial cells per low power field  Rare Polys  1+ Gram positive cocci in pairs  1+ Gram variable rods  --   --   --   --    INFLUENZA A PCR   --   --   --   --  None Detected   INFLUENZA B PCR   --   --   --   --  None Detected   RSV PCR   --   --   --   --  None Detected   LEGIONELLA URINARY ANTIGEN   --  Negative  --   --   --        Last 24 Hours Medication List:     Current Facility-Administered Medications:  acetaminophen 650 mg Oral Q6H PRN [employer], DO    albuterol 2 5 mg Nebulization Q6H PRN [employer], DO    azithromycin 500 mg Intravenous Q24H [employer], DO Last Rate: 500 mg (10/28/18 6394)   butalbital-acetaminophen-caffeine 1 tablet Oral Q4H PRN Tc Camarillo PA-C    cefTRIAXone 1,000 mg Intravenous Q24H [employer], DO Last Rate: 1,000 mg (10/28/18 2112)   enoxaparin 40 mg Subcutaneous Q24H [employer], DO    gabapentin 300 mg Oral TID [employer], DO    levalbuterol 1 25 mg Nebulization TID [employer], DO    morphine injection 2 mg Intravenous Q3H PRN [employer], DO    nicotine 1 patch Transdermal Daily [employer], DO    ondansetron 4 mg Intravenous Q4H PRN [employer], DO    oxyCODONE 5 mg Oral Q4H PRN [employer], DO    sodium chloride 75 mL/hr Intravenous Continuous [employer], DO Last Rate: 125 mL/hr (10/29/18 9060)   sodium chloride 3 mL Nebulization TID [employer], DO         Today, Patient Was Seen By: Serge Bowser, DO    ** Please Note: Dictation voice to text software may have been used in the creation of this document   **

## 2018-10-29 NOTE — PLAN OF CARE
CARDIOVASCULAR - ADULT     Maintains optimal cardiac output and hemodynamic stability Progressing     Absence of cardiac dysrhythmias or at baseline rhythm Progressing        DISCHARGE PLANNING     Discharge to home or other facility with appropriate resources Progressing        DISCHARGE PLANNING - CARE MANAGEMENT     Discharge to post-acute care or home with appropriate resources Progressing        INFECTION - ADULT     Absence or prevention of progression during hospitalization Progressing        Knowledge Deficit     Patient/family/caregiver demonstrates understanding of disease process, treatment plan, medications, and discharge instructions Progressing        METABOLIC, FLUID AND ELECTROLYTES - ADULT     Electrolytes maintained within normal limits Progressing     Fluid balance maintained Progressing        PAIN - ADULT     Verbalizes/displays adequate comfort level or baseline comfort level Progressing        SAFETY ADULT     Patient will remain free of falls Progressing     Maintain or return to baseline ADL function Progressing     Maintain or return mobility status to optimal level Progressing

## 2018-10-29 NOTE — ASSESSMENT & PLAN NOTE
· Follow culture results  Results for Sylvia Porter (MRN [id number]) as of 10/29/2018 12:29   Ref   Range 10/27/2018 15:27 10/27/2018 15:28 10/27/2018 15:34 10/28/2018 04:41   Procalcitonin Latest Ref Range: <=0 25 ng/ml 2 87 (H)   2 24 (H)     · Follow the procalcitonin level  · Check a sputum culture  · Continue IV ceftriaxone and IV azithromycin to treat the community-acquired pneumonia  · Continue the respiratory protocol and airway clearance protocol

## 2018-10-29 NOTE — ASSESSMENT & PLAN NOTE
· Consult Pulmonology  · Check an HIV antibody/antigen profile  · She will need outpatient surveillance imaging to ensure resolution of the abnormal  · She may need a bronchoscopy

## 2018-10-29 NOTE — ASSESSMENT & PLAN NOTE
· Subcarinal lymphadenopathy found on CT scan of the chest  · Likely reactive in nature  · She will need outpatient surveillance imaging to ensure resolution of the abnormal findings found on CT scan of the chest  Results for David Bernstein (MRN [id number]) as of 10/28/2018 08:40   Ref   Range 10/28/2018 04:41   LD (LDH) Latest Ref Range: 81 - 234 U/L 309 (H)     · Consult Pulmonology

## 2018-10-29 NOTE — ASSESSMENT & PLAN NOTE
· Supplemental oxygen to maintain oxygen saturation levels at 92% and above  · Respiratory protocol and airway clearance protocol  · Maintain continuous pulse oximetry  · Check the patient's pulse oximetry on room air with ambulation

## 2018-10-30 VITALS
SYSTOLIC BLOOD PRESSURE: 140 MMHG | TEMPERATURE: 98.4 F | OXYGEN SATURATION: 97 % | HEIGHT: 67 IN | HEART RATE: 81 BPM | WEIGHT: 238.98 LBS | RESPIRATION RATE: 18 BRPM | BODY MASS INDEX: 37.51 KG/M2 | DIASTOLIC BLOOD PRESSURE: 80 MMHG

## 2018-10-30 PROBLEM — J96.01 ACUTE RESPIRATORY FAILURE WITH HYPOXIA (HCC): Status: RESOLVED | Noted: 2018-10-28 | Resolved: 2018-10-30

## 2018-10-30 PROBLEM — R73.9 HYPERGLYCEMIA: Status: RESOLVED | Noted: 2018-10-29 | Resolved: 2018-10-30

## 2018-10-30 PROBLEM — R19.7 DIARRHEA: Status: RESOLVED | Noted: 2018-10-29 | Resolved: 2018-10-30

## 2018-10-30 LAB
ALBUMIN SERPL BCP-MCNC: 3 G/DL (ref 3.5–5)
ALP SERPL-CCNC: 85 U/L (ref 46–116)
ALT SERPL W P-5'-P-CCNC: 21 U/L (ref 12–78)
ANION GAP SERPL CALCULATED.3IONS-SCNC: 8 MMOL/L (ref 4–13)
AST SERPL W P-5'-P-CCNC: 9 U/L (ref 5–45)
BASOPHILS # BLD AUTO: 0.07 THOUSANDS/ΜL (ref 0–0.1)
BASOPHILS NFR BLD AUTO: 1 % (ref 0–1)
BILIRUB SERPL-MCNC: 0.2 MG/DL (ref 0.2–1)
BUN SERPL-MCNC: 8 MG/DL (ref 5–25)
CA-I BLD-SCNC: 1.17 MMOL/L (ref 1.12–1.32)
CALCIUM SERPL-MCNC: 8.8 MG/DL (ref 8.3–10.1)
CHLORIDE SERPL-SCNC: 104 MMOL/L (ref 100–108)
CO2 SERPL-SCNC: 26 MMOL/L (ref 21–32)
CREAT SERPL-MCNC: 0.61 MG/DL (ref 0.6–1.3)
EOSINOPHIL # BLD AUTO: 0.2 THOUSAND/ΜL (ref 0–0.61)
EOSINOPHIL NFR BLD AUTO: 2 % (ref 0–6)
ERYTHROCYTE [DISTWIDTH] IN BLOOD BY AUTOMATED COUNT: 12.2 % (ref 11.6–15.1)
EST. AVERAGE GLUCOSE BLD GHB EST-MCNC: 134 MG/DL
GFR SERPL CREATININE-BSD FRML MDRD: 118 ML/MIN/1.73SQ M
GLUCOSE SERPL-MCNC: 150 MG/DL (ref 65–140)
HBA1C MFR BLD: 6.3 % (ref 4.2–6.3)
HCT VFR BLD AUTO: 35.7 % (ref 34.8–46.1)
HGB BLD-MCNC: 11.5 G/DL (ref 11.5–15.4)
IMM GRANULOCYTES # BLD AUTO: 0.06 THOUSAND/UL (ref 0–0.2)
IMM GRANULOCYTES NFR BLD AUTO: 1 % (ref 0–2)
LYMPHOCYTES # BLD AUTO: 3.46 THOUSANDS/ΜL (ref 0.6–4.47)
LYMPHOCYTES NFR BLD AUTO: 27 % (ref 14–44)
MAGNESIUM SERPL-MCNC: 2 MG/DL (ref 1.6–2.6)
MCH RBC QN AUTO: 28.3 PG (ref 26.8–34.3)
MCHC RBC AUTO-ENTMCNC: 32.2 G/DL (ref 31.4–37.4)
MCV RBC AUTO: 88 FL (ref 82–98)
MONOCYTES # BLD AUTO: 0.89 THOUSAND/ΜL (ref 0.17–1.22)
MONOCYTES NFR BLD AUTO: 7 % (ref 4–12)
NEUTROPHILS # BLD AUTO: 8.07 THOUSANDS/ΜL (ref 1.85–7.62)
NEUTS SEG NFR BLD AUTO: 62 % (ref 43–75)
NRBC BLD AUTO-RTO: 0 /100 WBCS
PHOSPHATE SERPL-MCNC: 3.8 MG/DL (ref 2.7–4.5)
PLATELET # BLD AUTO: 234 THOUSANDS/UL (ref 149–390)
PMV BLD AUTO: 12.2 FL (ref 8.9–12.7)
POTASSIUM SERPL-SCNC: 4.1 MMOL/L (ref 3.5–5.3)
PROCALCITONIN SERPL-MCNC: 0.61 NG/ML
PROT SERPL-MCNC: 6.6 G/DL (ref 6.4–8.2)
RBC # BLD AUTO: 4.07 MILLION/UL (ref 3.81–5.12)
SODIUM SERPL-SCNC: 138 MMOL/L (ref 136–145)
WBC # BLD AUTO: 12.75 THOUSAND/UL (ref 4.31–10.16)

## 2018-10-30 PROCEDURE — 94668 MNPJ CHEST WALL SBSQ: CPT

## 2018-10-30 PROCEDURE — 94760 N-INVAS EAR/PLS OXIMETRY 1: CPT

## 2018-10-30 PROCEDURE — 94762 N-INVAS EAR/PLS OXIMTRY CONT: CPT

## 2018-10-30 PROCEDURE — 80053 COMPREHEN METABOLIC PANEL: CPT | Performed by: INTERNAL MEDICINE

## 2018-10-30 PROCEDURE — 85025 COMPLETE CBC W/AUTO DIFF WBC: CPT | Performed by: INTERNAL MEDICINE

## 2018-10-30 PROCEDURE — 84145 PROCALCITONIN (PCT): CPT | Performed by: INTERNAL MEDICINE

## 2018-10-30 PROCEDURE — 84100 ASSAY OF PHOSPHORUS: CPT | Performed by: INTERNAL MEDICINE

## 2018-10-30 PROCEDURE — 82330 ASSAY OF CALCIUM: CPT | Performed by: INTERNAL MEDICINE

## 2018-10-30 PROCEDURE — 87086 URINE CULTURE/COLONY COUNT: CPT | Performed by: INTERNAL MEDICINE

## 2018-10-30 PROCEDURE — 83036 HEMOGLOBIN GLYCOSYLATED A1C: CPT | Performed by: INTERNAL MEDICINE

## 2018-10-30 PROCEDURE — 94640 AIRWAY INHALATION TREATMENT: CPT

## 2018-10-30 PROCEDURE — 87147 CULTURE TYPE IMMUNOLOGIC: CPT | Performed by: INTERNAL MEDICINE

## 2018-10-30 PROCEDURE — 99239 HOSP IP/OBS DSCHRG MGMT >30: CPT | Performed by: INTERNAL MEDICINE

## 2018-10-30 PROCEDURE — 83735 ASSAY OF MAGNESIUM: CPT | Performed by: INTERNAL MEDICINE

## 2018-10-30 RX ORDER — OXYCODONE HYDROCHLORIDE 5 MG/1
5 TABLET ORAL EVERY 6 HOURS PRN
Qty: 10 TABLET | Refills: 0 | Status: SHIPPED | OUTPATIENT
Start: 2018-10-30 | End: 2018-11-09

## 2018-10-30 RX ORDER — LEVOFLOXACIN 750 MG/1
750 TABLET ORAL EVERY 24 HOURS
Qty: 4 TABLET | Refills: 0 | Status: SHIPPED | OUTPATIENT
Start: 2018-10-30 | End: 2018-11-03

## 2018-10-30 RX ADMIN — SODIUM CHLORIDE 75 ML/HR: 0.9 INJECTION, SOLUTION INTRAVENOUS at 08:25

## 2018-10-30 RX ADMIN — GABAPENTIN 300 MG: 300 CAPSULE ORAL at 10:03

## 2018-10-30 RX ADMIN — ISODIUM CHLORIDE 3 ML: 0.03 SOLUTION RESPIRATORY (INHALATION) at 08:24

## 2018-10-30 RX ADMIN — LEVALBUTEROL 1.25 MG: 1.25 SOLUTION, CONCENTRATE RESPIRATORY (INHALATION) at 08:24

## 2018-10-30 RX ADMIN — CYCLOBENZAPRINE HYDROCHLORIDE 5 MG: 10 TABLET, FILM COATED ORAL at 10:03

## 2018-10-30 NOTE — ASSESSMENT & PLAN NOTE
· Outpatient follow-up with Pulmonary Medicine  · HIV testing negative  · She will need outpatient surveillance imaging to ensure resolution of the abnormal CT findings

## 2018-10-30 NOTE — ASSESSMENT & PLAN NOTE
· Subcarinal lymphadenopathy found on CT scan of the chest  · Likely reactive in nature  · She will need outpatient surveillance imaging to ensure resolution of the abnormal findings found on CT scan of the chest    · Outpatient follow-up with Pulmonary Medicine

## 2018-10-30 NOTE — ASSESSMENT & PLAN NOTE
Outpatient follow-up with Pulmonary Medicine, needs repeat CT of the chest and 60 weeks, follow up with PCP

## 2018-10-30 NOTE — INCIDENTAL FINDINGS
The following findings require follow up:  Radiographic finding   Finding:  Abnormal CT of the chest   Follow up required:  Repeat CT of the chest in 6 to 8 weeks   Follow up should be done within 8 week(s)    Please notify the following clinician to assist with the follow up:   Dr Patricia Ceja

## 2018-10-30 NOTE — ASSESSMENT & PLAN NOTE
· The sepsis was present on admission and secondary to probable community-acquired pneumonia      · Patient be discharged to complete 7 day total course of antibiotics, Levaquin 750 mg daily for an additional 4 days

## 2018-10-30 NOTE — NURSING NOTE
Pt walked off unit by RN, accompanied by   Discharge instructions given to pt  Verbal understanding of same  Pt in no acute distress

## 2018-10-30 NOTE — SOCIAL WORK
Discussed with patient preferences on discharge;understanding how to manage health at home; purpose of taking medications; importance of follow up care/appointments; and symptoms to watch out for once discharged home  Pt will be calling to schedule her own follow up appointments

## 2018-10-30 NOTE — PLAN OF CARE
Problem: DISCHARGE PLANNING - CARE MANAGEMENT  Goal: Discharge to post-acute care or home with appropriate resources  INTERVENTIONS:  - Conduct assessment to determine patient/family and health care team treatment goals, and need for post-acute services based on payer coverage, community resources, and patient preferences, and barriers to discharge  - Address psychosocial, clinical, and financial barriers to discharge as identified in assessment in conjunction with the patient/family and health care team  - Arrange appropriate level of post-acute services according to patient's   needs and preference and payer coverage in collaboration with the physician and health care team  - Communicate with and update the patient/family, physician, and health care team regarding progress on the discharge plan  - Arrange appropriate transportation to post-acute venues   Outcome: Completed Date Met: 10/30/18  -PR home with outpatient follow up

## 2018-10-30 NOTE — ASSESSMENT & PLAN NOTE
· Nicotine patch ordered at discharge  · Smoking cessation counseling, patient was specifically encouraged to stop smoking by this provider on day of discharge

## 2018-10-30 NOTE — DISCHARGE SUMMARY
Discharge- [facility] 1985, 26 y o  female MRN: [id number]    Unit/Bed#: 401-01 Encounter: [id number]    Primary Care Provider: Elsa Mckee DO   Date and time admitted to hospital: 10/27/2018 12:05 PM        * Sepsis [facility])   Assessment & Plan    · The sepsis was present on admission and secondary to probable community-acquired pneumonia      · Patient be discharged to complete 7 day total course of antibiotics, Levaquin 750 mg daily for an additional 4 days     Arachnoid cyst   Assessment & Plan    · Outpatient follow-up with Neurosurgery     Elevated LDH   Assessment & Plan    · In the setting of lymphadenopathy  · Outpatient follow-up with Pulmonary Medicine     Abnormal CT scan, chest   Assessment & Plan    · Outpatient follow-up with Pulmonary Medicine  · HIV testing negative  · She will need outpatient surveillance imaging to ensure resolution of the abnormal CT findings       Lymphadenopathy   Assessment & Plan    · Subcarinal lymphadenopathy found on CT scan of the chest  · Likely reactive in nature  · She will need outpatient surveillance imaging to ensure resolution of the abnormal findings found on CT scan of the chest    · Outpatient follow-up with Pulmonary Medicine     Tobacco abuse   Assessment & Plan    · Nicotine patch ordered at discharge  · Smoking cessation counseling, patient was specifically encouraged to stop smoking by this provider on day of discharge     Community acquired pneumonia   Assessment & Plan    Outpatient follow-up with Pulmonary Medicine, needs repeat CT of the chest and 60 weeks, follow up with PCP           Discharging Physician / Practitioner: Katharine Monge DO  PCP: Elsa Mckee DO  Admission Date:   Admission Orders     Ordered        10/28/18 0827  Inpatient Admission  Once         10/27/18 1509  Place in Observation  Once             Discharge Date: 10/30/18    Resolved Problems  Date Reviewed: 10/30/2018          Resolved    Acute respiratory failure with hypoxia ([address] ) 10/30/2018     Resolved by  Esme Baltazar DO    Hyperglycemia 10/30/2018     Resolved by  Esme Baltazar DO    Diarrhea 10/30/2018     Resolved by  Esme Baltazar DO          Consultations During Hospital Stay:  · Pulmonary Medicine    Procedures Performed:     · None    Significant Findings / Test Results:     · Negative HIV testing    Incidental Findings:   · Abnormal CT of the chest   Impression:       1  Limited evaluation for pulmonary embolism  No large central PE identified  If there is persistent clinical concern for pulmonary embolism, consider VQ scan  2   Nonspecific symmetric groundglass opacification of the upper and lower lobes in a pattern raising the question of hypersensitivity pneumonitis  Additional imaging differential considerations include atypical infection, alveolar proteinosis or   hemorrhage due to vasculitis are less likely considerations  Correlation with history is needed  3   Mild prominence of the prevascular and subcarinal lymph nodes, possibly infectious/inflammatory  4  No acute abdominal or pelvic pathology  Test Results Pending at Discharge (will require follow up): · None     Outpatient Tests Requested:  · CT of the chest in 6 to 8 weeks    Complications:  None    Reason for Admission:  Shortness of breath    Hospital Course:     Simi Blackburn is a 26 y o  female patient who originally presented to the hospital on 10/27/2018 due to HPI per admitting history and physical outlined below  Chief Complaint:  Shortness of breath     History of Present Illness:     Simi Blackburn is a 26 y o  female who presents to the emergency department with the complaint of shortness of breath  The shortness of breath has been persistent over last 24 hours and has progressively worsened in nature  The shortness of breath is worsened with any type of exertion or physical activity  Nothing seemed to relieve the shortness of breath    She also complains of a non-productive cough as well as chest pain with deep inspiration  In addition, the patient has been experiencing nausea, vomiting, and generalized weakness  No fevers  No abdominal pain  She does complain of chills  No diarrhea  She was seen in the emergency department on 10/24/2018 for lower thoracic back pain and left flank pain  The patient continues to experience left-sided thoracic back pain and left flank pain  Review of Systems:     Review of Systems:  Per HPI, all other systems have been reviewed and were negative  Please see above list of diagnoses and related plan for additional information  Condition at Discharge: good     Discharge Day Visit / Exam:     Subjective:  Patient overall feeling better less pain with coughing  Vitals: Blood Pressure: 140/80 (10/30/18 0717)  Pulse: 81 (10/30/18 0717)  Temperature: 98 4 °F (36 9 °C) (10/30/18 0717)  Temp Source: Temporal (10/30/18 0717)  Respirations: 18 (10/30/18 0717)  Height: 5' 7" (170 2 cm) (10/27/18 1633)  Weight - Scale: 108 kg (238 lb 15 7 oz) (10/27/18 1633)  SpO2: 97 % (10/30/18 0824)  Exam:   Physical Exam   Constitutional: She is oriented to person, place, and time  She appears well-developed and well-nourished  No distress  Cardiovascular: Normal rate  Pulmonary/Chest: Effort normal and breath sounds normal  No respiratory distress  She has no rales  Musculoskeletal: Normal range of motion  Neurological: She is alert and oriented to person, place, and time  Skin: Skin is warm and dry  No rash noted  She is not diaphoretic  No erythema  Psychiatric: She has a normal mood and affect  Her behavior is normal  Judgment and thought content normal    Nursing note and vitals reviewed  Discharge instructions/Information to patient and family:   See after visit summary for information provided to patient and family        Provisions for Follow-Up Care:  See after visit summary for information related to follow-up care and any pertinent home health orders  Disposition:     Home    For Discharges to [facility]:   · Not Applicable to this Patient - Not Applicable to this Patient    Planned Readmission:  No     Discharge Statement:  I spent 35 minutes discharging the patient  This time was spent on the day of discharge  I had direct contact with the patient on the day of discharge  Greater than 50% of the total time was spent examining patient, answering all patient questions, arranging and discussing plan of care with patient as well as directly providing post-discharge instructions  Additional time then spent on discharge activities  Discharge Medications:  See after visit summary for reconciled discharge medications provided to patient and family        ** Please Note: This note has been constructed using a voice recognition system **

## 2018-10-31 ENCOUNTER — TRANSCRIBE ORDERS (OUTPATIENT)
Dept: NEUROLOGY | Facility: CLINIC | Age: 33
End: 2018-10-31

## 2018-10-31 DIAGNOSIS — R51.9 HEADACHE: Primary | ICD-10-CM

## 2018-10-31 LAB
BACTERIA SPT RESP CULT: NORMAL
BACTERIA UR CULT: ABNORMAL
GRAM STN SPEC: NORMAL

## 2018-11-01 LAB
BACTERIA BLD CULT: NORMAL
BACTERIA BLD CULT: NORMAL

## 2018-12-06 ENCOUNTER — TRANSCRIBE ORDERS (OUTPATIENT)
Dept: SLEEP CENTER | Facility: CLINIC | Age: 33
End: 2018-12-06

## 2018-12-06 DIAGNOSIS — J96.01 ACUTE RESPIRATORY FAILURE WITH HYPOXIA (HCC): Primary | ICD-10-CM

## 2018-12-27 ENCOUNTER — APPOINTMENT (EMERGENCY)
Dept: RADIOLOGY | Facility: HOSPITAL | Age: 33
End: 2018-12-27
Payer: COMMERCIAL

## 2018-12-27 ENCOUNTER — HOSPITAL ENCOUNTER (OUTPATIENT)
Facility: HOSPITAL | Age: 33
Setting detail: OBSERVATION
Discharge: HOME | End: 2018-12-28
Attending: EMERGENCY MEDICINE | Admitting: INTERNAL MEDICINE
Payer: COMMERCIAL

## 2018-12-27 DIAGNOSIS — D72.829 LEUKOCYTOSIS: ICD-10-CM

## 2018-12-27 DIAGNOSIS — R06.00 DYSPNEA AND RESPIRATORY ABNORMALITIES: Primary | ICD-10-CM

## 2018-12-27 DIAGNOSIS — R05.9 COUGH: ICD-10-CM

## 2018-12-27 DIAGNOSIS — J40 TRACHEOBRONCHITIS: ICD-10-CM

## 2018-12-27 DIAGNOSIS — I10 HYPERTENSION: ICD-10-CM

## 2018-12-27 DIAGNOSIS — J40 BRONCHITIS: ICD-10-CM

## 2018-12-27 DIAGNOSIS — R06.89 DYSPNEA AND RESPIRATORY ABNORMALITIES: Primary | ICD-10-CM

## 2018-12-27 PROBLEM — M79.10 MYALGIA, UNSPECIFIED SITE: Status: ACTIVE | Noted: 2018-12-27

## 2018-12-27 LAB
ALBUMIN SERPL BCP-MCNC: 3.6 G/DL (ref 3.5–5)
ALP SERPL-CCNC: 94 U/L (ref 46–116)
ALT SERPL W P-5'-P-CCNC: 34 U/L (ref 12–78)
ANION GAP SERPL CALCULATED.3IONS-SCNC: 13 MMOL/L (ref 4–13)
AST SERPL W P-5'-P-CCNC: 12 U/L (ref 5–45)
BASOPHILS # BLD AUTO: 0.07 THOUSANDS/ΜL (ref 0–0.1)
BASOPHILS NFR BLD AUTO: 0 % (ref 0–1)
BILIRUB SERPL-MCNC: 0.2 MG/DL (ref 0.2–1)
BILIRUB UR QL STRIP: NEGATIVE
BUN SERPL-MCNC: 7 MG/DL (ref 5–25)
CALCIUM SERPL-MCNC: 8.4 MG/DL (ref 8.3–10.1)
CHLORIDE SERPL-SCNC: 100 MMOL/L (ref 100–108)
CLARITY UR: CLEAR
CO2 SERPL-SCNC: 24 MMOL/L (ref 21–32)
COLOR UR: YELLOW
CREAT SERPL-MCNC: 0.67 MG/DL (ref 0.6–1.3)
EOSINOPHIL # BLD AUTO: 0.18 THOUSAND/ΜL (ref 0–0.61)
EOSINOPHIL NFR BLD AUTO: 1 % (ref 0–6)
ERYTHROCYTE [DISTWIDTH] IN BLOOD BY AUTOMATED COUNT: 12.2 % (ref 11.6–15.1)
GFR SERPL CREATININE-BSD FRML MDRD: 114 ML/MIN/1.73SQ M
GLUCOSE SERPL-MCNC: 140 MG/DL (ref 65–140)
GLUCOSE UR STRIP-MCNC: NEGATIVE MG/DL
HCT VFR BLD AUTO: 42.3 % (ref 34.8–46.1)
HGB BLD-MCNC: 14 G/DL (ref 11.5–15.4)
HGB UR QL STRIP.AUTO: NEGATIVE
IMM GRANULOCYTES # BLD AUTO: 0.09 THOUSAND/UL (ref 0–0.2)
IMM GRANULOCYTES NFR BLD AUTO: 1 % (ref 0–2)
KETONES UR STRIP-MCNC: NEGATIVE MG/DL
LACTATE SERPL-SCNC: 1.6 MMOL/L (ref 0.5–2)
LEUKOCYTE ESTERASE UR QL STRIP: NEGATIVE
LYMPHOCYTES # BLD AUTO: 4.61 THOUSANDS/ΜL (ref 0.6–4.47)
LYMPHOCYTES NFR BLD AUTO: 30 % (ref 14–44)
MCH RBC QN AUTO: 28.9 PG (ref 26.8–34.3)
MCHC RBC AUTO-ENTMCNC: 33.1 G/DL (ref 31.4–37.4)
MCV RBC AUTO: 87 FL (ref 82–98)
MONOCYTES # BLD AUTO: 0.86 THOUSAND/ΜL (ref 0.17–1.22)
MONOCYTES NFR BLD AUTO: 6 % (ref 4–12)
NEUTROPHILS # BLD AUTO: 9.81 THOUSANDS/ΜL (ref 1.85–7.62)
NEUTS SEG NFR BLD AUTO: 62 % (ref 43–75)
NITRITE UR QL STRIP: NEGATIVE
NRBC BLD AUTO-RTO: 0 /100 WBCS
PH UR STRIP.AUTO: 6.5 [PH] (ref 4.5–8)
PLATELET # BLD AUTO: 290 THOUSANDS/UL (ref 149–390)
PMV BLD AUTO: 11.6 FL (ref 8.9–12.7)
POTASSIUM SERPL-SCNC: 3.7 MMOL/L (ref 3.5–5.3)
PROT SERPL-MCNC: 7.4 G/DL (ref 6.4–8.2)
PROT UR STRIP-MCNC: NEGATIVE MG/DL
RBC # BLD AUTO: 4.84 MILLION/UL (ref 3.81–5.12)
SODIUM SERPL-SCNC: 137 MMOL/L (ref 136–145)
SP GR UR STRIP.AUTO: <=1.005 (ref 1–1.03)
UROBILINOGEN UR QL STRIP.AUTO: 0.2 E.U./DL
WBC # BLD AUTO: 15.62 THOUSAND/UL (ref 4.31–10.16)

## 2018-12-27 PROCEDURE — 87040 BLOOD CULTURE FOR BACTERIA: CPT | Performed by: EMERGENCY MEDICINE

## 2018-12-27 PROCEDURE — 94640 AIRWAY INHALATION TREATMENT: CPT

## 2018-12-27 PROCEDURE — 99220 PR INITIAL OBSERVATION CARE/DAY 70 MINUTES: CPT | Performed by: PHYSICIAN ASSISTANT

## 2018-12-27 PROCEDURE — 36415 COLL VENOUS BLD VENIPUNCTURE: CPT | Performed by: EMERGENCY MEDICINE

## 2018-12-27 PROCEDURE — 99284 EMERGENCY DEPT VISIT MOD MDM: CPT

## 2018-12-27 PROCEDURE — 71046 X-RAY EXAM CHEST 2 VIEWS: CPT

## 2018-12-27 PROCEDURE — 87631 RESP VIRUS 3-5 TARGETS: CPT | Performed by: EMERGENCY MEDICINE

## 2018-12-27 PROCEDURE — 96361 HYDRATE IV INFUSION ADD-ON: CPT

## 2018-12-27 PROCEDURE — 81003 URINALYSIS AUTO W/O SCOPE: CPT | Performed by: EMERGENCY MEDICINE

## 2018-12-27 PROCEDURE — 83605 ASSAY OF LACTIC ACID: CPT | Performed by: EMERGENCY MEDICINE

## 2018-12-27 PROCEDURE — 80053 COMPREHEN METABOLIC PANEL: CPT | Performed by: EMERGENCY MEDICINE

## 2018-12-27 PROCEDURE — 96375 TX/PRO/DX INJ NEW DRUG ADDON: CPT

## 2018-12-27 PROCEDURE — 96365 THER/PROPH/DIAG IV INF INIT: CPT

## 2018-12-27 PROCEDURE — 85025 COMPLETE CBC W/AUTO DIFF WBC: CPT | Performed by: EMERGENCY MEDICINE

## 2018-12-27 RX ORDER — SODIUM CHLORIDE 9 MG/ML
125 INJECTION, SOLUTION INTRAVENOUS CONTINUOUS
Status: DISCONTINUED | OUTPATIENT
Start: 2018-12-27 | End: 2018-12-28 | Stop reason: HOSPADM

## 2018-12-27 RX ORDER — MIRTAZAPINE 15 MG/1
30 TABLET, FILM COATED ORAL
Status: DISCONTINUED | OUTPATIENT
Start: 2018-12-27 | End: 2018-12-28 | Stop reason: HOSPADM

## 2018-12-27 RX ORDER — PREDNISONE 20 MG/1
20 TABLET ORAL 2 TIMES DAILY WITH MEALS
Status: DISCONTINUED | OUTPATIENT
Start: 2018-12-28 | End: 2018-12-28 | Stop reason: HOSPADM

## 2018-12-27 RX ORDER — DEXAMETHASONE SODIUM PHOSPHATE 10 MG/ML
10 INJECTION, SOLUTION INTRAMUSCULAR; INTRAVENOUS ONCE
Status: COMPLETED | OUTPATIENT
Start: 2018-12-27 | End: 2018-12-27

## 2018-12-27 RX ORDER — BUTALBITAL, ACETAMINOPHEN AND CAFFEINE 50; 325; 40 MG/1; MG/1; MG/1
1 TABLET ORAL EVERY 4 HOURS PRN
Status: DISCONTINUED | OUTPATIENT
Start: 2018-12-27 | End: 2018-12-28 | Stop reason: HOSPADM

## 2018-12-27 RX ORDER — BENZONATATE 100 MG/1
100 CAPSULE ORAL 3 TIMES DAILY PRN
Status: DISCONTINUED | OUTPATIENT
Start: 2018-12-27 | End: 2018-12-28 | Stop reason: HOSPADM

## 2018-12-27 RX ORDER — LEVOFLOXACIN 5 MG/ML
750 INJECTION, SOLUTION INTRAVENOUS ONCE
Status: COMPLETED | OUTPATIENT
Start: 2018-12-27 | End: 2018-12-27

## 2018-12-27 RX ORDER — ACETAMINOPHEN 325 MG/1
650 TABLET ORAL EVERY 6 HOURS PRN
Status: DISCONTINUED | OUTPATIENT
Start: 2018-12-27 | End: 2018-12-27

## 2018-12-27 RX ORDER — ALBUTEROL SULFATE 2.5 MG/3ML
2.5 SOLUTION RESPIRATORY (INHALATION) ONCE
Status: COMPLETED | OUTPATIENT
Start: 2018-12-27 | End: 2018-12-27

## 2018-12-27 RX ORDER — GABAPENTIN 300 MG/1
300 CAPSULE ORAL 3 TIMES DAILY
Status: DISCONTINUED | OUTPATIENT
Start: 2018-12-27 | End: 2018-12-28 | Stop reason: HOSPADM

## 2018-12-27 RX ORDER — OXYCODONE HYDROCHLORIDE AND ACETAMINOPHEN 5; 325 MG/1; MG/1
1 TABLET ORAL EVERY 6 HOURS PRN
Status: DISCONTINUED | OUTPATIENT
Start: 2018-12-27 | End: 2018-12-28 | Stop reason: HOSPADM

## 2018-12-27 RX ORDER — LEVOFLOXACIN 5 MG/ML
750 INJECTION, SOLUTION INTRAVENOUS EVERY 24 HOURS
Status: DISCONTINUED | OUTPATIENT
Start: 2018-12-28 | End: 2018-12-28 | Stop reason: HOSPADM

## 2018-12-27 RX ORDER — HEPARIN SODIUM 5000 [USP'U]/ML
5000 INJECTION, SOLUTION INTRAVENOUS; SUBCUTANEOUS EVERY 8 HOURS SCHEDULED
Status: DISCONTINUED | OUTPATIENT
Start: 2018-12-27 | End: 2018-12-27

## 2018-12-27 RX ADMIN — DEXAMETHASONE SODIUM PHOSPHATE 10 MG: 10 INJECTION, SOLUTION INTRAMUSCULAR; INTRAVENOUS at 18:13

## 2018-12-27 RX ADMIN — LEVOFLOXACIN 750 MG: 5 INJECTION, SOLUTION INTRAVENOUS at 18:58

## 2018-12-27 RX ADMIN — SODIUM CHLORIDE 1000 ML: 0.9 INJECTION, SOLUTION INTRAVENOUS at 18:07

## 2018-12-27 RX ADMIN — HEPARIN SODIUM 5000 UNITS: 5000 INJECTION, SOLUTION INTRAVENOUS; SUBCUTANEOUS at 21:47

## 2018-12-27 RX ADMIN — MIRTAZAPINE 30 MG: 15 TABLET, FILM COATED ORAL at 21:47

## 2018-12-27 RX ADMIN — GABAPENTIN 300 MG: 300 CAPSULE ORAL at 21:47

## 2018-12-27 RX ADMIN — SODIUM CHLORIDE 125 ML/HR: 0.9 INJECTION, SOLUTION INTRAVENOUS at 20:52

## 2018-12-27 RX ADMIN — ALBUTEROL SULFATE 2.5 MG: 2.5 SOLUTION RESPIRATORY (INHALATION) at 17:56

## 2018-12-27 NOTE — ED PROVIDER NOTES
History  Chief Complaint   Patient presents with    URI     Pt reports cough, body chills, & hoarse voice for about 2 wks  Pt has had dry cough for 3 weeks and myalgias 2 weeks and hoarseness 1 week  Pt thinks in last few days has had fever- no thermometer  Pt denies Abd pain  No N/V/D  No rashes  Has had occ wheezing  HAs occ chest tightness  No pain  Pt has tried OTC robitussin/Delsym / "cold and flu meds without help  PMH  Pt was admitted 10/27 for pneumonitis  Pt still smokes  ;chronic pain  ; obese        History provided by:  Patient  Shortness of Breath   Severity:  Moderate  Timing:  Constant  Progression:  Unchanged  Context: not animal exposure, not emotional upset and not strong odors    Worsened by: Activity and coughing  Associated symptoms: cough, fever and wheezing    Associated symptoms: no abdominal pain, no chest pain, no claudication, no diaphoresis, no ear pain, no hemoptysis, no neck pain, no rash, no sore throat, no sputum production, no syncope, no swollen glands and no vomiting    Risk factors: obesity and tobacco use    Risk factors: no hx of PE/DVT, no prolonged immobilization and no recent surgery        Prior to Admission Medications   Prescriptions Last Dose Informant Patient Reported? Taking?    butalbital-acetaminophen-caffeine (FIORICET,ESGIC) -40 mg per tablet   Yes Yes   Sig: Take 1 tablet by mouth every 4 (four) hours as needed for headaches   diazepam (VALIUM) 10 mg tablet   Yes Yes   Sig: TAKE 1-2 TABLETS BY MOUTH 1-2 HOURS BEFORE PROCEDURE   ergocalciferol (VITAMIN D2) 50,000 units   Yes Yes   gabapentin (NEURONTIN) 300 mg capsule   Yes Yes   Sig: TAKE 1 CAPSULE BY MOUTH 3 TO 4 TIMES A DAY   metFORMIN (GLUCOPHAGE) 500 mg tablet   Yes Yes   mirtazapine (REMERON) 30 mg tablet   Yes Yes   Sig: Take 30 mg by mouth daily at bedtime   nicotine (NICODERM CQ) 7 mg/24hr TD 24 hr patch More than a month at Unknown time  No No   Sig: Place 1 patch on the skin daily oxyCODONE-acetaminophen (PERCOCET)  mg per tablet   Yes Yes   Sig: take 1 tablet by mouth every 6 hours if needed severe pain      Facility-Administered Medications: None       Past Medical History:   Diagnosis Date    Anxiety     Arachnoid cyst     Chronic pain     back    Depression     Hypertension     Migraine        Past Surgical History:   Procedure Laterality Date     SECTION      CHOLECYSTECTOMY      CRANIOTOMY Right 3/1/2017    Procedure: RIGHT IMAGE GUIDED RETROMASTOID CRANIOTOMY FOR FENESTRATION AND PLACEMENT OF INTERNAL CYSTO-SUBARACHNOID SHUNT;  Surgeon: Mansoor Matias MD;  Location: [facility];  Service:    [facility] MOUTH SURGERY      WISDOM TOOTH EXTRACTION         History reviewed  No pertinent family history  I have reviewed and agree with the history as documented  Social History   Substance Use Topics    Smoking status: Current Every Day Smoker     Packs/day: 0 50     Types: Cigarettes    Smokeless tobacco: Never Used    Alcohol use No        Review of Systems   Constitutional: Positive for activity change and fever  Negative for appetite change and diaphoresis  HENT: Positive for congestion and voice change  Negative for drooling, ear pain, facial swelling, sore throat and trouble swallowing  Eyes: Negative  Negative for discharge, redness and visual disturbance  Respiratory: Positive for cough, shortness of breath and wheezing  Negative for hemoptysis and sputum production  Cardiovascular: Negative  Negative for chest pain, palpitations, claudication, leg swelling and syncope  Gastrointestinal: Negative  Negative for abdominal pain, diarrhea, nausea and vomiting  Genitourinary: Negative  Negative for dysuria, flank pain, hematuria and pelvic pain  Musculoskeletal: Positive for myalgias  Negative for arthralgias, gait problem, neck pain and neck stiffness  Skin: Negative  Negative for rash and wound  Neurological: Negative    Negative for dizziness, tremors, syncope and speech difficulty  Psychiatric/Behavioral: Negative  All other systems reviewed and are negative  Physical Exam  Physical Exam   Constitutional: She is oriented to person, place, and time  She appears well-developed  She is active and cooperative  Non-toxic appearance  No distress  Flushed/warm NAD   HENT:   Head: Normocephalic and atraumatic  Mouth/Throat: Oropharynx is clear and moist and mucous membranes are normal  Mucous membranes are not cyanotic  No posterior oropharyngeal edema or posterior oropharyngeal erythema  Eyes: Pupils are equal, round, and reactive to light  Conjunctivae and EOM are normal    Neck: Trachea normal, normal range of motion and phonation normal  Neck supple  No JVD present  Cardiovascular: Regular rhythm and intact distal pulses  No extrasystoles are present  Tachycardia present  No perf edema or calf tenderness   Pulmonary/Chest: No stridor  No respiratory distress  She has decreased breath sounds  She has rhonchi  She has no rales  Abdominal: Soft  Bowel sounds are normal  There is no rigidity, no guarding and no CVA tenderness  Lymphadenopathy:     She has no cervical adenopathy  Neurological: She is alert and oriented to person, place, and time  She has normal strength and normal reflexes  No cranial nerve deficit  Skin: Skin is warm and dry  No petechiae and no rash noted  She is not diaphoretic  No cyanosis  Psychiatric: She has a normal mood and affect  Her speech is normal and behavior is normal  Thought content normal  Cognition and memory are normal    Vitals reviewed        Vital Signs  ED Triage Vitals [12/27/18 1732]   Temperature Pulse Respirations Blood Pressure SpO2   98 3 °F (36 8 °C) 105 17 (!) 160/102 97 %      Temp Source Heart Rate Source Patient Position - Orthostatic VS BP Location FiO2 (%)   Temporal Monitor Sitting Left arm --      Pain Score       8           Vitals:    12/27/18 1901 12/27/18 2000 12/27/18 2030 12/27/18 2100   BP: 143/65 118/67 112/73 149/63   Pulse: 96 97 102 103   Patient Position - Orthostatic VS: Sitting Lying Lying Lying       Visual Acuity      ED Medications  Medications   sodium chloride 0 9 % infusion (125 mL/hr Intravenous New Bag 12/27/18 2052)   butalbital-acetaminophen-caffeine (FIORICET,ESGIC) -40 mg per tablet 1 tablet (not administered)   gabapentin (NEURONTIN) capsule 300 mg (not administered)   metFORMIN (GLUCOPHAGE) tablet 500 mg (not administered)   mirtazapine (REMERON) tablet 30 mg (not administered)   oxyCODONE-acetaminophen (PERCOCET) 5-325 mg per tablet 1 tablet (not administered)   nicotine (NICODERM CQ) 7 mg/24hr TD 24 hr patch 1 patch (not administered)   heparin (porcine) subcutaneous injection 5,000 Units (not administered)   levofloxacin (LEVAQUIN) IVPB (premix) 750 mg (not administered)   benzonatate (TESSALON PERLES) capsule 100 mg (not administered)   sodium chloride 0 9 % bolus 1,000 mL (0 mL Intravenous Stopped 12/27/18 2024)   albuterol inhalation solution 2 5 mg (2 5 mg Nebulization Given 12/27/18 1756)   dexamethasone (PF) (DECADRON) injection 10 mg (10 mg Intravenous Given 12/27/18 1813)   levofloxacin (LEVAQUIN) IVPB (premix) 750 mg (0 mg Intravenous Stopped 12/27/18 2028)       Diagnostic Studies  Results Reviewed     Procedure Component Value Units Date/Time    Platelet count [[id number]]     Lab Status:  No result Specimen:  Blood     UA w Reflex to Microscopic w Reflex to Culture [[id number]] Collected:  12/27/18 1856    Lab Status:  Final result Specimen:  Urine from Urine, Clean Catch Updated:  12/27/18 1908     Color, UA Yellow     Clarity, UA Clear     Specific Gravity, UA <=1 005     pH, UA 6 5     Leukocytes, UA Negative     Nitrite, UA Negative     Protein, UA Negative mg/dl      Glucose, UA Negative mg/dl      Ketones, UA Negative mg/dl      Urobilinogen, UA 0 2 E U /dl      Bilirubin, UA Negative     Blood, UA Negative    Blood culture #1 [[id number]] Collected:  12/27/18 1850    Lab Status: In process Specimen:  Blood from Hand, Right Updated:  12/27/18 1903    Blood culture #2 [[id number]] Collected:  12/27/18 1854    Lab Status: In process Specimen:  Blood from Hand, Left Updated:  12/27/18 1903    Lactic acid, plasma [[id number]]  (Normal) Collected:  12/27/18 1811    Lab Status:  Final result Specimen:  Blood from Arm, Right Updated:  12/27/18 1841     LACTIC ACID 1 6 mmol/L     Narrative:         Result may be elevated if tourniquet was used during collection  Comprehensive metabolic panel [[id number]] Collected:  12/27/18 1811    Lab Status:  Final result Specimen:  Blood from Arm, Right Updated:  12/27/18 1836     Sodium 137 mmol/L      Potassium 3 7 mmol/L      Chloride 100 mmol/L      CO2 24 mmol/L      ANION GAP 13 mmol/L      BUN 7 mg/dL      Creatinine 0 67 mg/dL      Glucose 140 mg/dL      Calcium 8 4 mg/dL      AST 12 U/L      ALT 34 U/L      Alkaline Phosphatase 94 U/L      Total Protein 7 4 g/dL      Albumin 3 6 g/dL      Total Bilirubin 0 20 mg/dL      eGFR 114 ml/min/1 73sq m     Narrative:         National Kidney Disease Education Program recommendations are as follows:  GFR calculation is accurate only with a steady state creatinine  Chronic Kidney disease less than 60 ml/min/1 73 sq  meters  Kidney failure less than 15 ml/min/1 73 sq  meters      CBC and differential [[id number]]  (Abnormal) Collected:  12/27/18 1811    Lab Status:  Final result Specimen:  Blood from Arm, Right Updated:  12/27/18 1821     WBC 15 62 (H) Thousand/uL      RBC 4 84 Million/uL      Hemoglobin 14 0 g/dL      Hematocrit 42 3 %      MCV 87 fL      MCH 28 9 pg      MCHC 33 1 g/dL      RDW 12 2 %      MPV 11 6 fL      Platelets 332 Thousands/uL      nRBC 0 /100 WBCs      Neutrophils Relative 62 %      Immat GRANS % 1 %      Lymphocytes Relative 30 %      Monocytes Relative 6 %      Eosinophils Relative 1 %      Basophils Relative 0 %      Neutrophils Absolute 9 81 (H) Thousands/µL      Immature Grans Absolute 0 09 Thousand/uL      Lymphocytes Absolute 4 61 (H) Thousands/µL      Monocytes Absolute 0 86 Thousand/µL      Eosinophils Absolute 0 18 Thousand/µL      Basophils Absolute 0 07 Thousands/µL     Influenza A/B and RSV by PCR [[id number]] Collected:  12/27/18 1811    Lab Status: In process Specimen:  Nasopharyngeal from Nasopharyngeal Swab Updated:  12/27/18 1818                 XR chest 2 views   ED Interpretation by Dafne Moreno DO (12/27 1843)   Increased marking RLL                 Procedures  Procedures       Phone Contacts  ED Phone Contact    ED Course  ED Course as of Dec 27 2139   Thu Dec 27, 2018   1825 WBC: (!) 15 62   1842 LACTIC ACID: 1 6   1904 Lungs course right greater then left    1905 Will discuss with SLIM    1915 Leukocytes, UA: Negative   1915 Nitrite, UA: Negative   1928 WBC: (!) 15 62                         Initial Sepsis Screening     Row Name 12/27/18 1846                Is the patient's history suggestive of a new or worsening infection? (!)  Yes (Proceed)  -PE        Suspected source of infection pneumonia  -PE        Are two or more of the following signs & symptoms of infection both present and new to the patient? No  -PE        Indicate SIRS criteria Leukocytosis (WBC > [address] IJL)  -PE        If the answer is yes to both questions, suspicion of sepsis is present  --        If severe sepsis is present AND tissue hypoperfusion perists in the hour after fluid resuscitation or lactate > 4, the patient meets criteria for SEPTIC SHOCK  --        Are any of the following organ dysfunction criteria present within 6 hours of suspected infection and SIRS criteria that are NOT considered to be chronic conditions?  No  -PE        Organ dysfunction  --        Date of presentation of severe sepsis  --        Time of presentation of severe sepsis  --        Tissue hypoperfusion persists in the hour after crystalloid fluid administration, evidenced, by either:  --        Was hypotension present within one hour of the conclusion of crystalloid fluid administration?  --        Date of presentation of septic shock  --        Time of presentation of septic shock  --          User Key  (r) = Recorded By, (t) = Taken By, (c) = Cosigned By    [address] Name Provider Type    TRACI Riley DO Physician                  MDM  CritCare Time    Disposition  Final diagnoses:   Dyspnea and respiratory abnormalities   Leukocytosis   Tracheobronchitis   Hypertension     Time reflects when diagnosis was documented in both MDM as applicable and the Disposition within this note     Time User Action Codes Description Comment    12/27/2018  6:47 PM Carine Kale Add [R06 00,  R06 89] Dyspnea and respiratory abnormalities     12/27/2018  6:47 PM Carine Chito Add [P13 611] Leukocytosis     12/27/2018  6:47 PM Carine Chito Add [J40] Tracheobronchitis     12/27/2018  6:48 PM Soraya Lozano [address] Hypertension       ED Disposition     ED Disposition Condition Comment    Admit  Case was discussed with Lowella Holstein, PA and the patient's admission status was agreed to be Admission Status: inpatient status to the service of Dr Elsie Diaz   Follow-up Information    None         Patient's Medications   Discharge Prescriptions    No medications on file     No discharge procedures on file      ED Provider  Electronically Signed by           Crow Riley DO  12/27/18 8974

## 2018-12-28 VITALS
SYSTOLIC BLOOD PRESSURE: 113 MMHG | DIASTOLIC BLOOD PRESSURE: 66 MMHG | TEMPERATURE: 98.3 F | BODY MASS INDEX: 38.34 KG/M2 | RESPIRATION RATE: 16 BRPM | OXYGEN SATURATION: 94 % | HEART RATE: 104 BPM | HEIGHT: 67 IN | WEIGHT: 244.27 LBS

## 2018-12-28 PROBLEM — J20.8 ACUTE BRONCHITIS DUE TO OTHER SPECIFIED ORGANISMS: Status: ACTIVE | Noted: 2018-12-27

## 2018-12-28 PROBLEM — J40 BRONCHITIS: Status: RESOLVED | Noted: 2018-12-28 | Resolved: 2018-12-28

## 2018-12-28 PROBLEM — D72.829 LEUKOCYTOSIS: Status: ACTIVE | Noted: 2018-12-28

## 2018-12-28 PROBLEM — J40 BRONCHITIS: Status: ACTIVE | Noted: 2018-12-28

## 2018-12-28 LAB
ANION GAP SERPL CALCULATED.3IONS-SCNC: 11 MMOL/L (ref 4–13)
APTT PPP: 33 SECONDS (ref 26–38)
BASOPHILS # BLD AUTO: 0.04 THOUSANDS/ΜL (ref 0–0.1)
BASOPHILS NFR BLD AUTO: 0 % (ref 0–1)
BUN SERPL-MCNC: 7 MG/DL (ref 5–25)
CALCIUM SERPL-MCNC: 8.5 MG/DL (ref 8.3–10.1)
CHLORIDE SERPL-SCNC: 105 MMOL/L (ref 100–108)
CO2 SERPL-SCNC: 24 MMOL/L (ref 21–32)
CREAT SERPL-MCNC: 0.7 MG/DL (ref 0.6–1.3)
EOSINOPHIL # BLD AUTO: 0.01 THOUSAND/ΜL (ref 0–0.61)
EOSINOPHIL NFR BLD AUTO: 0 % (ref 0–6)
ERYTHROCYTE [DISTWIDTH] IN BLOOD BY AUTOMATED COUNT: 12.3 % (ref 11.6–15.1)
FLUAV AG SPEC QL: NORMAL
FLUBV AG SPEC QL: NORMAL
GFR SERPL CREATININE-BSD FRML MDRD: 113 ML/MIN/1.73SQ M
GLUCOSE SERPL-MCNC: 204 MG/DL (ref 65–140)
HCT VFR BLD AUTO: 42.1 % (ref 34.8–46.1)
HGB BLD-MCNC: 13.7 G/DL (ref 11.5–15.4)
IMM GRANULOCYTES # BLD AUTO: 0.17 THOUSAND/UL (ref 0–0.2)
IMM GRANULOCYTES NFR BLD AUTO: 1 % (ref 0–2)
INR PPP: 1.05 (ref 0.86–1.17)
LYMPHOCYTES # BLD AUTO: 1.78 THOUSANDS/ΜL (ref 0.6–4.47)
LYMPHOCYTES NFR BLD AUTO: 9 % (ref 14–44)
MAGNESIUM SERPL-MCNC: 2.1 MG/DL (ref 1.6–2.6)
MCH RBC QN AUTO: 29 PG (ref 26.8–34.3)
MCHC RBC AUTO-ENTMCNC: 32.5 G/DL (ref 31.4–37.4)
MCV RBC AUTO: 89 FL (ref 82–98)
MONOCYTES # BLD AUTO: 0.91 THOUSAND/ΜL (ref 0.17–1.22)
MONOCYTES NFR BLD AUTO: 5 % (ref 4–12)
NEUTROPHILS # BLD AUTO: 16.61 THOUSANDS/ΜL (ref 1.85–7.62)
NEUTS SEG NFR BLD AUTO: 85 % (ref 43–75)
NRBC BLD AUTO-RTO: 0 /100 WBCS
PHOSPHATE SERPL-MCNC: 2.6 MG/DL (ref 2.7–4.5)
PLATELET # BLD AUTO: 280 THOUSANDS/UL (ref 149–390)
PMV BLD AUTO: 11.6 FL (ref 8.9–12.7)
POTASSIUM SERPL-SCNC: 4.2 MMOL/L (ref 3.5–5.3)
PROCALCITONIN SERPL-MCNC: <0.05 NG/ML
PROTHROMBIN TIME: 13.2 SECONDS (ref 11.8–14.2)
RBC # BLD AUTO: 4.73 MILLION/UL (ref 3.81–5.12)
RSV B RNA SPEC QL NAA+PROBE: NORMAL
SODIUM SERPL-SCNC: 140 MMOL/L (ref 136–145)
WBC # BLD AUTO: 19.52 THOUSAND/UL (ref 4.31–10.16)

## 2018-12-28 PROCEDURE — 83735 ASSAY OF MAGNESIUM: CPT | Performed by: PHYSICIAN ASSISTANT

## 2018-12-28 PROCEDURE — 80048 BASIC METABOLIC PNL TOTAL CA: CPT | Performed by: PHYSICIAN ASSISTANT

## 2018-12-28 PROCEDURE — 99217 PR OBSERVATION CARE DISCHARGE MANAGEMENT: CPT | Performed by: PHYSICIAN ASSISTANT

## 2018-12-28 PROCEDURE — 84100 ASSAY OF PHOSPHORUS: CPT | Performed by: PHYSICIAN ASSISTANT

## 2018-12-28 PROCEDURE — 36415 COLL VENOUS BLD VENIPUNCTURE: CPT | Performed by: PHYSICIAN ASSISTANT

## 2018-12-28 PROCEDURE — 84145 PROCALCITONIN (PCT): CPT | Performed by: PHYSICIAN ASSISTANT

## 2018-12-28 PROCEDURE — 85730 THROMBOPLASTIN TIME PARTIAL: CPT | Performed by: PHYSICIAN ASSISTANT

## 2018-12-28 PROCEDURE — 85025 COMPLETE CBC W/AUTO DIFF WBC: CPT | Performed by: PHYSICIAN ASSISTANT

## 2018-12-28 PROCEDURE — 85610 PROTHROMBIN TIME: CPT | Performed by: PHYSICIAN ASSISTANT

## 2018-12-28 PROCEDURE — 86308 HETEROPHILE ANTIBODY SCREEN: CPT | Performed by: PHYSICIAN ASSISTANT

## 2018-12-28 RX ORDER — PREDNISONE 10 MG/1
TABLET ORAL
Qty: 10 TABLET | Refills: 0 | Status: SHIPPED | OUTPATIENT
Start: 2018-12-28 | End: 2019-05-06 | Stop reason: ALTCHOICE

## 2018-12-28 RX ORDER — LEVOFLOXACIN 500 MG/1
500 TABLET, FILM COATED ORAL EVERY 24 HOURS
Qty: 4 TABLET | Refills: 0 | Status: SHIPPED | OUTPATIENT
Start: 2018-12-28 | End: 2019-01-01

## 2018-12-28 RX ORDER — ALBUTEROL SULFATE 90 UG/1
2 AEROSOL, METERED RESPIRATORY (INHALATION) EVERY 6 HOURS PRN
Qty: 8.5 G | Refills: 0 | Status: SHIPPED | OUTPATIENT
Start: 2018-12-28 | End: 2019-05-06 | Stop reason: ALTCHOICE

## 2018-12-28 RX ORDER — BENZONATATE 100 MG/1
100 CAPSULE ORAL 3 TIMES DAILY PRN
Qty: 20 CAPSULE | Refills: 0 | Status: SHIPPED | OUTPATIENT
Start: 2018-12-28 | End: 2019-05-06 | Stop reason: ALTCHOICE

## 2018-12-28 RX ORDER — FLUTICASONE PROPIONATE 50 MCG
1 SPRAY, SUSPENSION (ML) NASAL DAILY
Qty: 1 BOTTLE | Refills: 0 | Status: SHIPPED | OUTPATIENT
Start: 2018-12-28 | End: 2020-05-24

## 2018-12-28 RX ADMIN — GABAPENTIN 300 MG: 300 CAPSULE ORAL at 11:17

## 2018-12-28 RX ADMIN — METFORMIN HYDROCHLORIDE 500 MG: 500 TABLET, FILM COATED ORAL at 11:16

## 2018-12-28 RX ADMIN — PREDNISONE 20 MG: 20 TABLET ORAL at 11:16

## 2018-12-28 NOTE — UTILIZATION REVIEW
Initial Clinical Review    Admission: Date/Time/Statement: 12/28/18 @ 0929 Observation Written   Date/Time Action Taken User Additional Information   12/28/18 0928 Release Lucas Crump PA-C (auto-released) [facility]: [id number]   12/28/18 0928 Complete Lucas Crump PA-C    Order Details     Frequency Duration Priority Order Class   Once 1  occurrence Routine Hospital Performed       ED: Date/Time/Mode of Arrival:   ED Arrival Information     Expected Arrival Acuity Means of Arrival Escorted By Service Admission Type    - 12/27/2018 17:01 Urgent [facility]In [facility] General Medicine Urgent    Arrival Complaint    chest congestion          Chief Complaint:   Chief Complaint   Patient presents with    URI     Pt reports cough, body chills, & hoarse voice for about 2 wks  History of Illness: Pt has had dry cough for 3 weeks and myalgias 2 weeks and hoarseness 1 week  Pt thinks in last few days has had fever- no thermometer  Pt denies Abd pain  No N/V/D  No rashes  Has had occ wheezing  HAs occ chest tightness  No pain  Pt has tried OTC robitussin/Delsym / "cold and flu meds without help        ED Vital Signs:   ED Triage Vitals [12/27/18 1732]   Temperature Pulse Respirations Blood Pressure SpO2   98 3 °F (36 8 °C) 105 17 (!) 160/102 97 %      Temp Source Heart Rate Source Patient Position - Orthostatic VS BP Location FiO2 (%)   Temporal Monitor Sitting Left arm --      Pain Score       8        Wt Readings from Last 1 Encounters:   12/27/18 111 kg (244 lb 4 3 oz)       Vital Signs (abnormal): -121    Abnormal Labs/Diagnostic Test Results:   WBC 15 62  EKG:  SR  CXR:  NAD    ED Treatment:   Medication Administration from 12/27/2018 1701 to 12/28/2018 0804       Date/Time Order Dose Route Action     12/27/2018 1807 sodium chloride 0 9 % bolus 1,000 mL 1,000 mL Intravenous New Bag     12/27/2018 2052 sodium chloride 0 9 % infusion 125 mL/hr Intravenous New Bag     12/27/2018 1756 albuterol inhalation solution 2 5 mg 2 5 mg Nebulization Given     12/27/2018 1813 dexamethasone (PF) (DECADRON) injection 10 mg 10 mg Intravenous Given     12/27/2018 1858 levofloxacin (LEVAQUIN) IVPB (premix) 750 mg 750 mg Intravenous New Bag     12/27/2018 2147 gabapentin (NEURONTIN) capsule 300 mg 300 mg Oral Given     12/27/2018 2147 mirtazapine (REMERON) tablet 30 mg 30 mg Oral Given     12/27/2018 2147 heparin (porcine) subcutaneous injection 5,000 Units 5,000 Units Subcutaneous Given          Past Medical/Surgical History:    Active Ambulatory Problems     Diagnosis Date Noted    Cerebral cyst 03/01/2017    Osteoarthritis of spine with radiculopathy, cervical region 08/07/2018    Sepsis ([facility] [address] ) 10/27/2018    Community acquired pneumonia 10/27/2018    Tobacco abuse 10/27/2018    Lymphadenopathy 10/27/2018    Abnormal CT scan, chest 10/28/2018    Elevated LDH 10/28/2018    Arachnoid cyst 10/29/2018     Resolved Ambulatory Problems     Diagnosis Date Noted    Acute respiratory failure with hypoxia ([facility] [address] ) 10/28/2018    Hyperglycemia 10/29/2018    Diarrhea 10/29/2018     Past Medical History:   Diagnosis Date    Anxiety     Arachnoid cyst     Chronic pain     Depression     Hypertension     Migraine     Psychiatric disorder        Admitting Diagnosis: Chest congestion [R09 89]    Age/Sex: 26 y o  female    Assessment/Plan:   Myalgia, unspecified site   Assessment & Plan     - generalized myalgia present for 2 weeks  - continue hydration  - need to consider viral source   * Cough   Assessment & Plan     - cough has been present for 3 weeks  - not relieved by over-the-counter medications  - patient started on Levaquin by emergency department, will continue  - Tessalon Perles p r n   - need to consider viral source     Admission Orders:  OOB with assist  Daily weights, I/O  Sequential compression device    Scheduled Meds:   Current Facility-Administered Medications:  benzonatate 100 mg Oral TID PRN butalbital-acetaminophen-caffeine 1 tablet Oral Q4H PRN   gabapentin 300 mg Oral TID   levofloxacin 750 mg Intravenous Q24H   metFORMIN 500 mg Oral BID With Meals   mirtazapine 30 mg Oral HS   nicotine 1 patch Transdermal Daily   oxyCODONE-acetaminophen 1 tablet Oral Q6H PRN   predniSONE 20 mg Oral BID With Meals   sodium chloride 125 mL/hr Intravenous Continuous     Continuous Infusions:   sodium chloride 125 mL/hr Last Rate: 125 mL/hr (12/27/18 2052)     PRN Meds:   benzonatate    butalbital-acetaminophen-caffeine    oxyCODONE-acetaminophen

## 2018-12-28 NOTE — DISCHARGE INSTRUCTIONS
Encourage REST upon discharge home today and tomorrow  Will give work excuse for tomorrow 12/29  Encourage staying hydrated with fluids  Use rescue inhaler as needed for shortness of breath or chest tightness, cough  May also use tessalon perles as needed  Flonase (nasal spray) should help with nasal congestion  Prednisone taper should help with inflammation and cough  Take levaquin as prescribed for probable progression from viral  to bacterial upper respiratory infection  See your primary care provider ASAP if symptoms do not improve significantly over the weekend  Return to the ER if you notice trouble breathing, high fever, chest pain, or symptoms worsen overall

## 2018-12-28 NOTE — ASSESSMENT & PLAN NOTE
- cough and symptoms has been present for 3 weeks with no relief after appropriate trial of over-the-counter medications  - patient started on Levaquin by emergency department, will continue PO levaquin for total 5 day course, as suspect possible progression into bacterial bronchitis / early pneumonia  - May continue Tessalon Perles PRN  - Will check procalcitonin and mononucleosis screen - will be pending at discharge, for outpatient follow up  - Flonase for nasal congestion  - Albuterol HFA inhaler for relief of wheezing / SOB    - Give short course of prednisone at 20mg daily for 3 days, then 10mg daily for 3 days   - Encouraged rest/ Supportive care  - Work note given for 12/29/18    - Follow up with PCP within 1-2 weeks, or sooner if symptoms worsen  - Return to the ER if symptoms become severe    - May need referral to pulmonology if symptoms remain

## 2018-12-28 NOTE — H&P
H&P- Mingo Shea 1985, 26 y o  female MRN: [id number]    Unit/Bed#: RM02 Encounter: [id number]    Primary Care Provider: Morelia Baer DO   Date and time admitted to hospital: 12/27/2018  5:29 PM        Myalgia, unspecified site   Assessment & Plan    - generalized myalgia present for 2 weeks  - continue hydration  - need to consider viral source     * Cough   Assessment & Plan    - cough has been present for 3 weeks  - not relieved by over-the-counter medications  - patient started on Levaquin by emergency department, will continue  - Tessalon Perles p r n   - need to consider viral source         History and Physical - [facility]    Patient Information: Mingo Shea 26 y o  female MRN: [id number]  Unit/Bed#: VN59 Encounter: [id number]  Admitting Physician: Dona Hutchins PA-C  PCP: Morelia Baer DO  Date of Admission:  12/27/18    Assessment/Plan:    Hospital Problem List:     Principal Problem:    Cough  Active Problems:    Myalgia, unspecified site          VTE Prophylaxis: Pharmacologic VTE Prophylaxis contraindicated due to Low risk  / sequential compression device   Code Status:  Full  POLST: There is no POLST form on file for this patient (pre-hospital)    Anticipated Length of Stay:  Patient will be admitted on an Inpatient basis with an anticipated length of stay of  < 2 midnights  Justification for Hospital Stay:  Persistent cough for 3 weeks, admitted by emergency department attending    Total Time for Visit, including Counseling / Coordination of Care: 30 minutes  Greater than 50% of this total time spent on direct patient counseling and coordination of care      Chief Complaint:   Persistent cough for 3 weeks, admitted by emergency department attending    History of Present Illness:    Mingo Shea is a 26 y o  female with past medical history of hypertension, chronic pain, anxiety, his others listed below  who presented to the emergency department with a 3 week history of cough, 2 week history of generalized myalgia, and a one-week history of hoarseness  Evaluation in emergency department included laboratory and radiologic studies which identified:  WBC count 15 60, and CT scan showed a symmetric ground glass opacification with question of hypersensitivity pneumonitis  Patient was started on Levaquin in the emergency department and admitted to the medical-surgical floor by the emergency department attending  Will continue Levaquin, and Christian Gann, will also start prednisone  Review of Systems:    Review of Systems    Past Medical and Surgical History:     Past Medical History:   Diagnosis Date    Anxiety     Arachnoid cyst     Chronic pain     back    Depression     Hypertension     Migraine        Past Surgical History:   Procedure Laterality Date     SECTION      CHOLECYSTECTOMY      CRANIOTOMY Right 3/1/2017    Procedure: RIGHT IMAGE GUIDED RETROMASTOID CRANIOTOMY FOR FENESTRATION AND PLACEMENT OF INTERNAL CYSTO-SUBARACHNOID SHUNT;  Surgeon: Yessi Thurman MD;  Location: [facility];  Service:    [facility] MOUTH SURGERY      WISDOM TOOTH EXTRACTION         Meds/Allergies:    Prior to Admission medications    Medication Sig Start Date End Date Taking?  Authorizing Provider   butalbital-acetaminophen-caffeine (FIORICET,ESGIC) -40 mg per tablet Take 1 tablet by mouth every 4 (four) hours as needed for headaches   Yes Historical Provider, MD   diazepam (VALIUM) 10 mg tablet TAKE 1-2 TABLETS BY MOUTH 1-2 HOURS BEFORE PROCEDURE 18  Yes Historical Provider, MD   ergocalciferol (VITAMIN D2) 50,000 units  10/7/18  Yes Historical Provider, MD   gabapentin (NEURONTIN) 300 mg capsule TAKE 1 CAPSULE BY MOUTH 3 TO 4 TIMES A DAY 18  Yes Historical Provider, MD   metFORMIN (GLUCOPHAGE) 500 mg tablet  10/9/18  Yes Historical Provider, MD   mirtazapine (REMERON) 30 mg tablet Take 30 mg by mouth daily at bedtime 10/8/18  Yes Historical Provider, MD oxyCODONE-acetaminophen (PERCOCET)  mg per tablet take 1 tablet by mouth every 6 hours if needed severe pain 10/8/18  Yes Historical Provider, MD   nicotine (NICODERM CQ) 7 mg/24hr TD 24 hr patch Place 1 patch on the skin daily 10/30/18   Fely Godwin DO     I have reviewed home medications with patient personally  Allergies: No Known Allergies    Social History:     Marital Status: /Civil Union   Occupation: n/a  Patient Pre-hospital Living Situation:  Home  Patient Pre-hospital Level of Mobility:  Full  Patient Pre-hospital Diet Restrictions:  None  Substance Use History:   History   Alcohol Use No     History   Smoking Status    Current Every Day Smoker    Packs/day: 0 50    Types: Cigarettes   Smokeless Tobacco    Never Used     History   Drug Use No       Family History:    non-contributory    Physical Exam:     Vitals:   Blood Pressure: 149/63 (12/27/18 2100)  Pulse: 103 (12/27/18 2100)  Temperature: 98 3 °F (36 8 °C) (12/27/18 1732)  Temp Source: Temporal (12/27/18 1732)  Respirations: 18 (12/27/18 2100)  Height: 5' 7" (170 2 cm) (12/27/18 1732)  Weight - Scale: 111 kg (244 lb 4 3 oz) (12/27/18 1732)  SpO2: 97 % (12/27/18 2100)    Physical Exam        Additional Data:     Lab Results: I have personally reviewed pertinent reports  Results from last 7 days  Lab Units 12/27/18  1811   WBC Thousand/uL 15 62*   HEMOGLOBIN g/dL 14 0   HEMATOCRIT % 42 3   PLATELETS Thousands/uL 290   NEUTROS PCT % 62   LYMPHS PCT % 30   MONOS PCT % 6   EOS PCT % 1       Results from last 7 days  Lab Units 12/27/18  1811   POTASSIUM mmol/L 3 7   CHLORIDE mmol/L 100   CO2 mmol/L 24   BUN mg/dL 7   CREATININE mg/dL 0 67   CALCIUM mg/dL 8 4   ALK PHOS U/L 94   ALT U/L 34   AST U/L 12           Imaging: I have personally reviewed pertinent reports  No results found      EKG, Pathology, and Other Studies Reviewed on Admission:   · EKG: SR    Allscripts / Epic Records Reviewed: Yes     ** Please Note: This note has been constructed using a voice recognition system   **

## 2018-12-28 NOTE — DISCHARGE SUMMARY
Discharge- Wayne Noonan 1985, 26 y o  female MRN: [id number]    Unit/Bed#: RM13 Encounter: [id number]    Primary Care Provider: Lilian Oshea DO   Date and time admitted to hospital: 12/27/2018  5:29 PM        * Acute bronchitis due to other specified organisms   Assessment & Plan    - cough and symptoms has been present for 3 weeks with no relief after appropriate trial of over-the-counter medications  - patient started on Levaquin by emergency department, will continue PO levaquin for total 5 day course, as suspect possible progression into bacterial bronchitis / early pneumonia  - May continue Tessalon Perles PRN  - Will check procalcitonin and mononucleosis screen - will be pending at discharge, for outpatient follow up  - Flonase for nasal congestion  - Albuterol HFA inhaler for relief of wheezing / SOB    - Give short course of prednisone at 20mg daily for 3 days, then 10mg daily for 3 days   - Encouraged rest/ Supportive care  - Work note given for 12/29/18    - Follow up with PCP within 1-2 weeks, or sooner if symptoms worsen  - Return to the ER if symptoms become severe  Myalgia, unspecified site   Assessment & Plan    - generalized myalgia and fatigue present for 2 weeks  - continue hydration at home and encouraged rest   - need to consider viral source - mono screen is pending  Tobacco abuse   Assessment & Plan    Encouarged smoking cessation            Discharging Physician / Practitioner: Tyra Lakhani PA-C  PCP: Lilian Oshea DO  Admission Date:   Admission Orders     Ordered        12/28/18 0928  Place in Observation  Once         12/27/18 1938  Inpatient Admission (expected length of stay for this patient is greater than two midnights)  Once             Discharge Date: 12/28/18    Resolved Problems  Date Reviewed: 12/28/2018          Resolved    Bronchitis 12/28/2018     Resolved by  Tyra Lakhani PA-C          Consultations During [facility] Stay:  · none    Procedures Performed:     · none    Significant Findings / Test Results:   Xr Chest 2 Views  Result Date: 12/28/2018  Impression: No acute cardiopulmonary disease  Workstation performed: [id number]       Incidental Findings:   · none    Test Results Pending at Discharge (will require follow up): · Mononucleosis screen  · procalcitonin     Outpatient Tests Requested:  · none    Complications:  none    Reason for Admission: Bronchitis    Hospital Course:   Laura Simon is a 26 y o  female patient who originally presented to the hospital on 12/27/2018 due to URI (Pt reports cough, body chills, & hoarse voice for about 2 wks  )    Patient was admitted and placed on IV levaquin and PO steroids  She is feeling about 20% improvement of symptoms after an overnight stay, and is eating and drinking well, although appetite decreased slightly  No vomiting or fever overnight  Overall, she improved faster than anticipated and well enough to be discharged home, with a work note to have off tomorrow  Supportive care was encouraged, as well as PCP follow up  RX on discharge: Prednisone, Levaquin, Flonase, Tessalon Perles, albuterol HFA inhaler  Please see above list of diagnoses and related plan for additional information  Condition at Discharge: good     Discharge Day Visit / Exam:   Subjective:  Patient feeling better, less SOB and feels steroids helped cough as well  Still feeling fatigued  Appetite is fair, slightly decreased from usual  Meal completion noted to be about 75% this morning  Vitals: Blood Pressure: 113/66 (12/28/18 0804)  Pulse: 104 (12/28/18 0804)  Temperature: 98 3 °F (36 8 °C) (12/27/18 1732)  Temp Source: Temporal (12/27/18 1732)  Respirations: 16 (12/28/18 0804)  Height: 5' 7" (170 2 cm) (12/27/18 1732)  Weight - Scale: 111 kg (244 lb 4 3 oz) (12/27/18 1732)  SpO2: 94 % (12/28/18 0804)  Exam:   Physical Exam   Constitutional: She is oriented to person, place, and time   She appears well-developed and well-nourished  No distress  HENT:   Mouth/Throat: Oropharynx is clear and moist    Slight erythema of the posterior pharynx with signs of postnasal drip present  Slight enlargement of the tonsils bilaterally but no exudates and no significant erythema  Cardiovascular: Normal rate, regular rhythm and normal heart sounds  No murmur heard  Pulmonary/Chest: Effort normal and breath sounds normal  No respiratory distress  She has no wheezes  She has no rales  Abdominal: Soft  She exhibits no distension  There is no tenderness  There is no rebound  Musculoskeletal: She exhibits no edema  Neurological: She is alert and oriented to person, place, and time  Skin: Skin is warm and dry  She is not diaphoretic  Psychiatric: She has a normal mood and affect  Nursing note and vitals reviewed  Discharge instructions/Information to patient and family:   See after visit summary for information provided to patient and family  Provisions for Follow-Up Care:  See after visit summary for information related to follow-up care and any pertinent home health orders  Disposition:     Home    Planned Readmission: no     Discharge Statement:  I spent 35 minutes discharging the patient  This time was spent on the day of discharge  I had direct contact with the patient on the day of discharge  Greater than 50% of the total time was spent examining patient, answering all patient questions, arranging and discussing plan of care with patient as well as directly providing post-discharge instructions  Additional time then spent on discharge activities  Discharge Medications:  See after visit summary for reconciled discharge medications provided to patient and family        ** Please Note: This note has been constructed using a voice recognition system **

## 2018-12-28 NOTE — ASSESSMENT & PLAN NOTE
Patient had leukocytosis on admission of 15,000, increasing to 19,000 after administration of 10mg of decadron overnight  Likely steroid response  Outpatient follow up appropriate

## 2018-12-28 NOTE — ASSESSMENT & PLAN NOTE
- cough has been present for 3 weeks  - not relieved by over-the-counter medications  - patient started on Levaquin by emergency department, will continue  - Tessalon Perles p r n   - need to consider viral source

## 2018-12-28 NOTE — ASSESSMENT & PLAN NOTE
- generalized myalgia and fatigue present for 2 weeks  - continue hydration at home and encouraged rest   - need to consider viral source - mono screen is pending

## 2018-12-31 LAB — HETEROPH AB SER QL: NEGATIVE

## 2019-01-02 LAB
BACTERIA BLD CULT: NORMAL
BACTERIA BLD CULT: NORMAL

## 2019-02-10 ENCOUNTER — HOSPITAL ENCOUNTER (EMERGENCY)
Facility: HOSPITAL | Age: 34
Discharge: HOME | End: 2019-02-10
Attending: EMERGENCY MEDICINE
Payer: COMMERCIAL

## 2019-02-10 VITALS
BODY MASS INDEX: 38.79 KG/M2 | DIASTOLIC BLOOD PRESSURE: 83 MMHG | OXYGEN SATURATION: 98 % | TEMPERATURE: 99.9 F | HEART RATE: 118 BPM | HEIGHT: 67 IN | RESPIRATION RATE: 18 BRPM | SYSTOLIC BLOOD PRESSURE: 120 MMHG | WEIGHT: 247.14 LBS

## 2019-02-10 DIAGNOSIS — J03.90 TONSILLITIS: Primary | ICD-10-CM

## 2019-02-10 PROCEDURE — 99282 EMERGENCY DEPT VISIT SF MDM: CPT

## 2019-02-10 RX ORDER — IBUPROFEN 600 MG/1
600 TABLET ORAL EVERY 8 HOURS PRN
Qty: 15 TABLET | Refills: 0 | Status: SHIPPED | OUTPATIENT
Start: 2019-02-10 | End: 2019-10-07

## 2019-02-10 RX ORDER — DEXAMETHASONE 2 MG/1
10 TABLET ORAL ONCE
Qty: 5 TABLET | Refills: 0 | Status: SHIPPED | OUTPATIENT
Start: 2019-02-10 | End: 2019-02-10

## 2019-02-10 RX ORDER — AMOXICILLIN 500 MG/1
500 CAPSULE ORAL EVERY 8 HOURS SCHEDULED
Qty: 21 CAPSULE | Refills: 0 | Status: SHIPPED | OUTPATIENT
Start: 2019-02-10 | End: 2019-02-17

## 2019-02-10 NOTE — ED PROVIDER NOTES
History  Chief Complaint   Patient presents with    Sore Throat     swelling throat with  white bumps  History provided by:  Patient and medical records   used: No    Sore Throat   Location:  Generalized  Quality:  Aching, sharp and sore  Severity:  Moderate  Onset quality:  Sudden (fine yesterday, woke up with sx today)  Duration:  4 hours  Timing:  Constant  Progression:  Unchanged  Chronicity:  New  Relieved by:  Nothing  Worsened by:  Nothing  Ineffective treatments:  None tried  Associated symptoms: no abdominal pain, no adenopathy, no chest pain, no chills, no cough, no drooling, no ear discharge, no ear pain, no epistaxis, no eye discharge, no fever, no headaches, no neck stiffness, no plugged ear sensation, no postnasal drip, no rash, no rhinorrhea, no shortness of breath, no stridor, no trouble swallowing and no voice change    Risk factors: sick contacts        Prior to Admission Medications   Prescriptions Last Dose Informant Patient Reported? Taking?    albuterol (PROAIR HFA) 90 mcg/act inhaler   No No   Sig: Inhale 2 puffs every 6 (six) hours as needed for wheezing   benzonatate (TESSALON PERLES) 100 mg capsule   No No   Sig: Take 1 capsule (100 mg total) by mouth 3 (three) times a day as needed for cough   butalbital-acetaminophen-caffeine (FIORICET,ESGIC) -40 mg per tablet   Yes No   Sig: Take 1 tablet by mouth every 4 (four) hours as needed for headaches   diazepam (VALIUM) 10 mg tablet   Yes No   Sig: TAKE 1-2 TABLETS BY MOUTH 1-2 HOURS BEFORE PROCEDURE   ergocalciferol (VITAMIN D2) 50,000 units   Yes No   fluticasone (FLONASE) 50 mcg/act nasal spray   No No   Si spray into each nostril daily for 7 days   gabapentin (NEURONTIN) 300 mg capsule   Yes No   Sig: TAKE 1 CAPSULE BY MOUTH 3 TO 4 TIMES A DAY   metFORMIN (GLUCOPHAGE) 500 mg tablet   Yes No   mirtazapine (REMERON) 30 mg tablet   Yes No   Sig: Take 30 mg by mouth daily at bedtime   nicotine (NICODERM CQ) 7 mg/24hr TD 24 hr patch   No No   Sig: Place 1 patch on the skin daily   oxyCODONE-acetaminophen (PERCOCET)  mg per tablet   Yes No   Sig: take 1 tablet by mouth every 6 hours if needed severe pain   predniSONE 10 mg tablet   No No   Sig: Take 2 tablets daily for 3 days then 1 tablet daily for 4 days      Facility-Administered Medications: None       Past Medical History:   Diagnosis Date    Anxiety     Arachnoid cyst     Chronic pain     back    Depression     Hypertension     Migraine     Psychiatric disorder     Anxiety and Depression       Past Surgical History:   Procedure Laterality Date     SECTION      CHOLECYSTECTOMY      CRANIOTOMY Right 3/1/2017    Procedure: RIGHT IMAGE GUIDED RETROMASTOID CRANIOTOMY FOR FENESTRATION AND PLACEMENT OF INTERNAL CYSTO-SUBARACHNOID SHUNT;  Surgeon: Ashley Montero MD;  [facility];  Service:    [facility] MOUTH SURGERY      WISDOM TOOTH EXTRACTION         Family History   Problem Relation Age of Onset    Rheum arthritis Mother     Hypertension Mother     Breast cancer Mother     Polycystic ovary syndrome Mother     No Known Problems Brother     Asthma Son     Diabetes Maternal Grandmother     Thyroid disease Maternal Grandmother     Stroke Maternal Grandmother     Hypertension Maternal Grandmother     Rheum arthritis Maternal Grandfather     Lung cancer Maternal Grandfather     Hypertension Maternal Grandfather     COPD Maternal Grandfather     Asthma Son     Asthma Son     Ear Disease Son      I have reviewed and agree with the history as documented  Social History     Tobacco Use    Smoking status: Current Every Day Smoker     Packs/day: 0 50     Types: Cigarettes    Smokeless tobacco: Never Used   Substance Use Topics    Alcohol use: No    Drug use: No        Review of Systems   Constitutional: Negative for activity change, appetite change, chills, diaphoresis, fatigue, fever and unexpected weight change     HENT: Positive for sore throat  Negative for congestion, dental problem, drooling, ear discharge, ear pain, facial swelling, hearing loss, mouth sores, nosebleeds, postnasal drip, rhinorrhea, sinus pressure, sneezing, trouble swallowing and voice change  Eyes: Negative for pain, discharge and redness  Respiratory: Negative for cough, chest tightness, shortness of breath and stridor  Cardiovascular: Negative for chest pain, palpitations and leg swelling  Gastrointestinal: Negative for abdominal pain, constipation, diarrhea, nausea and vomiting  Genitourinary: Negative for difficulty urinating, dysuria, flank pain, frequency, hematuria and urgency  Musculoskeletal: Negative for arthralgias, back pain, myalgias and neck stiffness  Skin: Negative for color change, rash and wound  Allergic/Immunologic: Negative for immunocompromised state  Neurological: Negative for dizziness, tremors, syncope, weakness, numbness and headaches  Hematological: Negative for adenopathy  Does not bruise/bleed easily  Physical Exam  Physical Exam   Constitutional: She appears well-developed and well-nourished  No distress  HENT:   Head: Normocephalic and atraumatic  Head is without right periorbital erythema and without left periorbital erythema  Right Ear: Tympanic membrane and external ear normal    Left Ear: Tympanic membrane and external ear normal    Nose: Nose normal  No mucosal edema or rhinorrhea  Mouth/Throat: Uvula is midline and mucous membranes are normal  No oral lesions  No trismus in the jaw  No uvula swelling  Posterior oropharyngeal erythema present  No posterior oropharyngeal edema or tonsillar abscesses  Tonsils are 2+ on the right  Tonsils are 2+ on the left  Tonsillar exudate  Eyes: Pupils are equal, round, and reactive to light  Conjunctivae are normal  Right eye exhibits no discharge  Left eye exhibits no discharge  Neck: Normal range of motion  Neck supple     Cardiovascular: Normal rate, regular rhythm, normal heart sounds and intact distal pulses  No murmur heard  Pulmonary/Chest: Effort normal and breath sounds normal  No respiratory distress  She exhibits no tenderness  Musculoskeletal: She exhibits no edema  Lymphadenopathy:     She has cervical adenopathy  Neurological: She is alert  Skin: Skin is warm and dry  She is not diaphoretic  Psychiatric: She has a normal mood and affect  Nursing note and vitals reviewed  Vital Signs  ED Triage Vitals [02/10/19 1039]   Temperature Pulse Respirations Blood Pressure SpO2   99 9 °F (37 7 °C) (!) 118 18 120/83 98 %      Temp Source Heart Rate Source Patient Position - Orthostatic VS BP Location FiO2 (%)   Temporal Monitor Standing Right arm --      Pain Score       8           Vitals:    02/10/19 1039   BP: 120/83   Pulse: (!) 118   Patient Position - Orthostatic VS: Standing       Visual Acuity      ED Medications  Medications - No data to display    Diagnostic Studies  Results Reviewed     None                 No orders to display              Procedures  Procedures       Phone Contacts  ED Phone Contact    ED Course                               MDM  Number of Diagnoses or Management Options  Tonsillitis: new and does not require workup  Risk of Complications, Morbidity, and/or Mortality  Presenting problems: low  Diagnostic procedures: low  Management options: low    Patient Progress  Patient progress: stable      Disposition  Final diagnoses: Tonsillitis     Time reflects when diagnosis was documented in both MDM as applicable and the Disposition within this note     Time User Action Codes Description Comment    2/10/2019 11:05 AM Pb Gonzalez Add [W42 10] Tonsillitis       ED Disposition     ED Disposition Condition Date/Time Comment    Discharge Good Sun Feb 10, 2019 11:05 AM Jalen Vargas discharge to home/self care              Follow-up Information     Follow up With Specialties Details Why Contact Ko Siu DO Family Medicine Schedule an appointment as soon as possible for a visit  Seen in ER need followup for illness [address] [address]  [phone]            Discharge Medication List as of 2/10/2019 11:07 AM      START taking these medications    Details   amoxicillin (AMOXIL) 500 mg capsule Take 1 capsule (500 mg total) by mouth every 8 (eight) hours for 7 days, Starting Sun 2/10/2019, Until Sun 2/17/2019, Normal      dexamethasone (DECADRON) 2 mg tablet Take 5 tablets (10 mg total) by mouth once for 1 dose, Starting Sun 2/10/2019, Normal      ibuprofen (MOTRIN) 600 mg tablet Take 1 tablet (600 mg total) by mouth every 8 (eight) hours as needed (pain/fever/headache/injury) for up to 5 days, Starting Sun 2/10/2019, Until Fri 2/15/2019, Normal         CONTINUE these medications which have NOT CHANGED    Details   albuterol (PROAIR HFA) 90 mcg/act inhaler Inhale 2 puffs every 6 (six) hours as needed for wheezing, Starting Fri 12/28/2018, Normal      benzonatate (TESSALON PERLES) 100 mg capsule Take 1 capsule (100 mg total) by mouth 3 (three) times a day as needed for cough, Starting Fri 12/28/2018, Normal      butalbital-acetaminophen-caffeine (FIORICET,ESGIC) -40 mg per tablet Take 1 tablet by mouth every 4 (four) hours as needed for headaches, Historical Med      diazepam (VALIUM) 10 mg tablet TAKE 1-2 TABLETS BY MOUTH 1-2 HOURS BEFORE PROCEDURE, Historical Med      ergocalciferol (VITAMIN D2) 50,000 units Starting Sun 10/7/2018, Historical Med      fluticasone (FLONASE) 50 mcg/act nasal spray 1 spray into each nostril daily for 7 days, Starting Fri 12/28/2018, Until Fri 1/4/2019, Normal      gabapentin (NEURONTIN) 300 mg capsule TAKE 1 CAPSULE BY MOUTH 3 TO 4 TIMES A DAY, Historical Med      metFORMIN (GLUCOPHAGE) 500 mg tablet Starting Tue 10/9/2018, Historical Med      mirtazapine (REMERON) 30 mg tablet Take 30 mg by mouth daily at bedtime, Starting Mon 10/8/2018, Historical Med      nicotine (NICODERM CQ) 7 mg/24hr TD 24 hr patch Place 1 patch on the skin daily, Starting Tue 10/30/2018, Normal      oxyCODONE-acetaminophen (PERCOCET)  mg per tablet take 1 tablet by mouth every 6 hours if needed severe pain, Historical Med      predniSONE 10 mg tablet Take 2 tablets daily for 3 days then 1 tablet daily for 4 days, Normal           No discharge procedures on file      ED Provider  Electronically Signed by           Mayur Talley PA-C  02/10/19 3698

## 2019-05-06 ENCOUNTER — APPOINTMENT (EMERGENCY)
Dept: RADIOLOGY | Facility: HOSPITAL | Age: 34
End: 2019-05-06
Payer: COMMERCIAL

## 2019-05-06 ENCOUNTER — HOSPITAL ENCOUNTER (EMERGENCY)
Facility: HOSPITAL | Age: 34
Discharge: HOME | End: 2019-05-06
Attending: EMERGENCY MEDICINE
Payer: COMMERCIAL

## 2019-05-06 VITALS
OXYGEN SATURATION: 99 % | BODY MASS INDEX: 38.67 KG/M2 | TEMPERATURE: 98.8 F | RESPIRATION RATE: 16 BRPM | DIASTOLIC BLOOD PRESSURE: 90 MMHG | HEART RATE: 116 BPM | SYSTOLIC BLOOD PRESSURE: 174 MMHG | WEIGHT: 246.91 LBS

## 2019-05-06 DIAGNOSIS — S93.401A SPRAIN OF RIGHT ANKLE, UNSPECIFIED LIGAMENT, INITIAL ENCOUNTER: Primary | ICD-10-CM

## 2019-05-06 LAB — EXT PREG TEST URINE: NEGATIVE

## 2019-05-06 PROCEDURE — 29515 APPLICATION SHORT LEG SPLINT: CPT | Performed by: EMERGENCY MEDICINE

## 2019-05-06 PROCEDURE — 99284 EMERGENCY DEPT VISIT MOD MDM: CPT

## 2019-05-06 PROCEDURE — 73590 X-RAY EXAM OF LOWER LEG: CPT

## 2019-05-06 PROCEDURE — 73630 X-RAY EXAM OF FOOT: CPT

## 2019-05-06 PROCEDURE — 73610 X-RAY EXAM OF ANKLE: CPT

## 2019-05-06 PROCEDURE — 99283 EMERGENCY DEPT VISIT LOW MDM: CPT | Performed by: EMERGENCY MEDICINE

## 2019-05-06 PROCEDURE — 81025 URINE PREGNANCY TEST: CPT | Performed by: EMERGENCY MEDICINE

## 2019-05-06 RX ORDER — NAPROXEN 500 MG/1
500 TABLET ORAL ONCE
Status: COMPLETED | OUTPATIENT
Start: 2019-05-06 | End: 2019-05-06

## 2019-05-06 RX ORDER — NAPROXEN 500 MG/1
500 TABLET ORAL 2 TIMES DAILY WITH MEALS
Qty: 14 TABLET | Refills: 0 | Status: SHIPPED | OUTPATIENT
Start: 2019-05-06 | End: 2020-05-24

## 2019-05-06 RX ADMIN — NAPROXEN 500 MG: 500 TABLET ORAL at 14:18

## 2019-05-08 ENCOUNTER — OFFICE VISIT (OUTPATIENT)
Dept: OBGYN CLINIC | Facility: CLINIC | Age: 34
End: 2019-05-08
Payer: COMMERCIAL

## 2019-05-08 VITALS
WEIGHT: 260 LBS | BODY MASS INDEX: 39.4 KG/M2 | DIASTOLIC BLOOD PRESSURE: 87 MMHG | SYSTOLIC BLOOD PRESSURE: 137 MMHG | HEART RATE: 103 BPM | HEIGHT: 68 IN

## 2019-05-08 DIAGNOSIS — E66.9 OBESITY (BMI 35.0-39.9 WITHOUT COMORBIDITY): ICD-10-CM

## 2019-05-08 DIAGNOSIS — S93.401A SPRAIN OF UNSPECIFIED LIGAMENT OF RIGHT ANKLE, INITIAL ENCOUNTER: Primary | ICD-10-CM

## 2019-05-08 PROCEDURE — 99203 OFFICE O/P NEW LOW 30 MIN: CPT | Performed by: ORTHOPAEDIC SURGERY

## 2019-10-07 ENCOUNTER — HOSPITAL ENCOUNTER (EMERGENCY)
Facility: HOSPITAL | Age: 34
Discharge: HOME | End: 2019-10-07
Attending: EMERGENCY MEDICINE | Admitting: EMERGENCY MEDICINE
Payer: COMMERCIAL

## 2019-10-07 ENCOUNTER — APPOINTMENT (EMERGENCY)
Dept: RADIOLOGY | Facility: HOSPITAL | Age: 34
End: 2019-10-07
Payer: COMMERCIAL

## 2019-10-07 VITALS
RESPIRATION RATE: 20 BRPM | TEMPERATURE: 99.1 F | HEIGHT: 67 IN | WEIGHT: 257.06 LBS | HEART RATE: 103 BPM | BODY MASS INDEX: 40.35 KG/M2 | OXYGEN SATURATION: 96 % | DIASTOLIC BLOOD PRESSURE: 69 MMHG | SYSTOLIC BLOOD PRESSURE: 120 MMHG

## 2019-10-07 DIAGNOSIS — R05.9 COUGH IN ADULT: ICD-10-CM

## 2019-10-07 DIAGNOSIS — J22 LOWER RESPIRATORY TRACT INFECTION: Primary | ICD-10-CM

## 2019-10-07 PROCEDURE — 94640 AIRWAY INHALATION TREATMENT: CPT

## 2019-10-07 PROCEDURE — 99283 EMERGENCY DEPT VISIT LOW MDM: CPT

## 2019-10-07 PROCEDURE — 99284 EMERGENCY DEPT VISIT MOD MDM: CPT | Performed by: EMERGENCY MEDICINE

## 2019-10-07 PROCEDURE — 71046 X-RAY EXAM CHEST 2 VIEWS: CPT

## 2019-10-07 RX ORDER — ALBUTEROL SULFATE 90 UG/1
2 AEROSOL, METERED RESPIRATORY (INHALATION) EVERY 4 HOURS PRN
Qty: 1 INHALER | Refills: 1 | Status: SHIPPED | OUTPATIENT
Start: 2019-10-07

## 2019-10-07 RX ORDER — HYDROCODONE POLISTIREX AND CHLORPHENIRAMINE POLISTIREX 10; 8 MG/5ML; MG/5ML
5 SUSPENSION, EXTENDED RELEASE ORAL ONCE
Status: COMPLETED | OUTPATIENT
Start: 2019-10-07 | End: 2019-10-07

## 2019-10-07 RX ORDER — ACETAMINOPHEN 325 MG/1
975 TABLET ORAL ONCE
Status: COMPLETED | OUTPATIENT
Start: 2019-10-07 | End: 2019-10-07

## 2019-10-07 RX ORDER — PROMETHAZINE HYDROCHLORIDE 25 MG/1
25 TABLET ORAL ONCE
Status: COMPLETED | OUTPATIENT
Start: 2019-10-07 | End: 2019-10-07

## 2019-10-07 RX ORDER — ONDANSETRON 8 MG/1
8 TABLET, ORALLY DISINTEGRATING ORAL EVERY 8 HOURS PRN
Qty: 20 TABLET | Refills: 0 | Status: SHIPPED | OUTPATIENT
Start: 2019-10-07 | End: 2020-05-24

## 2019-10-07 RX ORDER — ALBUTEROL SULFATE 2.5 MG/3ML
2.5 SOLUTION RESPIRATORY (INHALATION) ONCE
Status: COMPLETED | OUTPATIENT
Start: 2019-10-07 | End: 2019-10-07

## 2019-10-07 RX ORDER — DOXYCYCLINE HYCLATE 100 MG/1
100 CAPSULE ORAL 2 TIMES DAILY
Qty: 20 CAPSULE | Refills: 0 | Status: SHIPPED | OUTPATIENT
Start: 2019-10-10 | End: 2019-10-20

## 2019-10-07 RX ADMIN — ACETAMINOPHEN 975 MG: 325 TABLET, FILM COATED ORAL at 15:28

## 2019-10-07 RX ADMIN — PROMETHAZINE HYDROCHLORIDE 25 MG: 25 TABLET ORAL at 15:29

## 2019-10-07 RX ADMIN — HYDROCODONE POLISTIREX AND CHLORPHENIRAMINE POLISTIREX 5 ML: 10; 8 SUSPENSION, EXTENDED RELEASE ORAL at 16:44

## 2019-10-07 RX ADMIN — ALBUTEROL SULFATE 2.5 MG: 2.5 SOLUTION RESPIRATORY (INHALATION) at 15:30

## 2019-10-07 NOTE — ED PROVIDER NOTES
History  Chief Complaint   Patient presents with    Cough     coughing for 3 days, feeling weak, fever  chest/back pain while coughing        History provided by:  Patient and medical records  Cough   Cough characteristics:  Non-productive  Severity:  Moderate  Onset quality:  Sudden  Duration:  3 days  Timing:  Constant  Progression:  Worsening  Chronicity:  Recurrent (Episode of pneumonitis/atypical pneumonia in Oct 2018 for which she was hospitalized at this facility)  Smoker: yes (0 5 ppd; states that she has had to decrease cigarette use over past 2d d/2 worsening sx)    Context: sick contacts (Child has URI sx and AGE sx within past 2 wk) and upper respiratory infection    Context: not weather changes and not with activity    Relieved by:  Nothing  Worsened by:  Nothing  Ineffective treatments: Did not respond to multiple doses of Tylenol Cold/Flu  Associated symptoms: chills, diaphoresis, rhinorrhea and sinus congestion    Associated symptoms: no chest pain, no fever, no rash, no shortness of breath and no sore throat    Risk factors: no chemical exposure, no recent infection and no recent travel    Risk factors comment:  No head/neck surgery  No abx use in past 30d  Prior to Admission Medications   Prescriptions Last Dose Informant Patient Reported? Taking?    butalbital-acetaminophen-caffeine (FIORICET,ESGIC) -40 mg per tablet Not Taking at Unknown time  Yes No   Sig: Take 1 tablet by mouth every 4 (four) hours as needed for headaches   diazepam (VALIUM) 10 mg tablet   Yes Yes   Sig: TAKE 1-2 TABLETS BY MOUTH 1-2 HOURS BEFORE PROCEDURE   ergocalciferol (VITAMIN D2) 50,000 units   Yes Yes   fluticasone (FLONASE) 50 mcg/act nasal spray   No No   Si spray into each nostril daily for 7 days   gabapentin (NEURONTIN) 300 mg capsule   Yes Yes   Sig: TAKE 1 CAPSULE BY MOUTH 3 TO 4 TIMES A DAY   levonorgestrel (MIRENA) 20 MCG/24HR IUD   Yes Yes   Si each by Intrauterine route once   naproxen (NAPROSYN) 500 mg tablet Not Taking at Unknown time  No No   Sig: Take 1 tablet (500 mg total) by mouth 2 (two) times a day with meals   Patient not taking: Reported on 2019   oxyCODONE-acetaminophen (PERCOCET)  mg per tablet   Yes Yes   Sig: take 1 tablet by mouth every 6 hours if needed severe pain      Facility-Administered Medications: None       Past Medical History:   Diagnosis Date    Anxiety     Arachnoid cyst     Chronic pain     back    Depression     Hypertension     Migraine     Mitral valve prolapse     Psychiatric disorder     Anxiety and Depression       Past Surgical History:   Procedure Laterality Date     SECTION      CHOLECYSTECTOMY      CRANIOTOMY Right 3/1/2017    Procedure: RIGHT IMAGE GUIDED RETROMASTOID CRANIOTOMY FOR FENESTRATION AND PLACEMENT OF INTERNAL CYSTO-SUBARACHNOID SHUNT;  Surgeon: Sebas Che MD;  [facility];  Service:    [facility] MOUTH SURGERY      WISDOM TOOTH EXTRACTION         Family History   Problem Relation Age of Onset    Rheum arthritis Mother     Hypertension Mother     Breast cancer Mother     Polycystic ovary syndrome Mother     No Known Problems Brother     Asthma Son     Diabetes Maternal Grandmother     Thyroid disease Maternal Grandmother     Stroke Maternal Grandmother     Hypertension Maternal Grandmother     Rheum arthritis Maternal Grandfather     Lung cancer Maternal Grandfather     Hypertension Maternal Grandfather     COPD Maternal Grandfather     Asthma Son     Asthma Son     Ear Disease Son      I have reviewed and agree with the history as documented  Social History     Tobacco Use    Smoking status: Current Every Day Smoker     Packs/day: 0 50     Types: Cigarettes    Smokeless tobacco: Never Used   Substance Use Topics    Alcohol use: Yes     Comment: occasionally     Drug use: No        Review of Systems   Constitutional: Positive for chills, diaphoresis and fatigue  Negative for fever  HENT: Positive for congestion and rhinorrhea  Negative for sore throat  Respiratory: Positive for cough and chest tightness  Negative for shortness of breath  Cardiovascular: Negative for chest pain and palpitations  Gastrointestinal: Positive for nausea  Negative for abdominal pain and vomiting  Skin: Negative for color change, pallor, rash and wound  Neurological: Positive for weakness (generalized/nonfocal weakness)  Negative for light-headedness  Hematological: Negative for adenopathy  Does not bruise/bleed easily  All other systems reviewed and are negative  Physical Exam  Physical Exam   Constitutional: She is oriented to person, place, and time  She appears well-developed and well-nourished  She is cooperative  No distress  HENT:   Head: Normocephalic and atraumatic  Right Ear: Hearing, tympanic membrane, external ear and ear canal normal    Left Ear: Hearing, tympanic membrane, external ear and ear canal normal    Nose: Nose normal    Mouth/Throat: Uvula is midline, oropharynx is clear and moist and mucous membranes are normal  No oropharyngeal exudate  Neck: Trachea normal, normal range of motion and phonation normal  Neck supple  No JVD present  No tracheal tenderness, no spinous process tenderness and no muscular tenderness present  No tracheal deviation present  No thyroid mass and no thyromegaly present  Cardiovascular: Normal rate, regular rhythm, S1 normal, S2 normal, normal heart sounds and intact distal pulses  Exam reveals no gallop and no friction rub  No murmur heard  Pulses:       Radial pulses are 2+ on the right side, and 2+ on the left side  Dorsalis pedis pulses are 2+ on the right side, and 2+ on the left side  Posterior tibial pulses are 2+ on the right side, and 2+ on the left side  Pulmonary/Chest: Effort normal and breath sounds normal  No stridor  No respiratory distress  She has no decreased breath sounds  She has no wheezes   She has no rhonchi  She has no rales  She exhibits no tenderness  Intermittent hacking/nonproductive cough   Musculoskeletal: Normal range of motion  She exhibits no edema, tenderness or deformity  Lymphadenopathy:     She has no cervical adenopathy  Neurological: She is alert and oriented to person, place, and time  GCS eye subscore is 4  GCS verbal subscore is 5  GCS motor subscore is 6  Skin: Skin is warm, dry and intact  No rash noted  She is not diaphoretic  No erythema  Nursing note and vitals reviewed  Vital Signs  ED Triage Vitals [10/07/19 1440]   Temperature Pulse Respirations Blood Pressure SpO2   98 4 °F (36 9 °C) (!) 106 22 132/76 97 %      Temp Source Heart Rate Source Patient Position - Orthostatic VS BP Location FiO2 (%)   Temporal Monitor Lying Left arm --      Pain Score       8           Vitals:    10/07/19 1440 10/07/19 1445 10/07/19 1545 10/07/19 1645   BP: 132/76 132/76 128/65 120/69   Pulse: (!) 106 101 92 103   Patient Position - Orthostatic VS: Lying Lying Lying Lying         Visual Acuity      ED Medications  Medications   acetaminophen (TYLENOL) tablet 975 mg (975 mg Oral Given 10/7/19 1528)   promethazine (PHENERGAN) tablet 25 mg (25 mg Oral Given 10/7/19 1529)   albuterol inhalation solution 2 5 mg (2 5 mg Nebulization Given 10/7/19 1530)   hydrocodone-chlorpheniramine polistirex (TUSSIONEX) 10-8 mg/5 mL ER suspension 5 mL (5 mL Oral Given 10/7/19 1644)       Diagnostic Studies  Results Reviewed     None                 XR chest 2 views   Final Result by Steve Leon MD (10/07 1544)      No acute cardiopulmonary disease  Workstation performed: [id number]                    Procedures  Procedures       ED Course  ED Course as of Oct 07 1949   [facility] Oct 07, 2019   1645 Patient symptomatically much improved  Upon re-evaluation, she is awake and alert and in no respiratory distress    Breath sounds are improved bilaterally upon reexamination with no wheeze/crackles/rhonchi  She speaks in full sentences  I will discharge with further symptomatic management and recommend follow-up with primary physician approximately seven days time  I will prescribe antibiotic coverage but encouraged patient not to initiate it unless she has significant clinical worsening within the next three days  All questions were answered prior to discharge  The patient expressed understanding and agreed to plan  [facility]  Based on review of records, there is no evidence of controlled substance abuse or repeated inappropriate ED visits to obtain controlled substances  It is therefore reasonable to prescribe a short course of opiate-based antitussive for symptom control with close f/u to PMD also advised  Precautions given to avoid use of opiate medications while driving and to abstain from alcohol while using                  MDM  Number of Diagnoses or Management Options  Cough in adult: new and requires workup  Lower respiratory tract infection: new and requires workup     Amount and/or Complexity of Data Reviewed  Tests in the radiology section of CPT®: ordered and reviewed  Decide to obtain previous medical records or to obtain history from someone other than the patient: yes  Review and summarize past medical records: yes  Independent visualization of images, tracings, or specimens: yes    Risk of Complications, Morbidity, and/or Mortality  Presenting problems: high  Diagnostic procedures: moderate  Management options: high    Patient Progress  Patient progress: improved      Disposition  Final diagnoses:   Lower respiratory tract infection   Cough in adult     Time reflects when diagnosis was documented in both MDM as applicable and the Disposition within this note     Time User Action Codes Description Comment    10/7/2019  5:13 PM Mario Tijerina Add [J22] Lower respiratory tract infection     10/7/2019  5:13 PM Mario Tijerina Add [R05] Cough in adult       ED Disposition     ED Disposition Condition Date/Time Comment    Discharge Stable Mon Oct 7, 2019  5:13 PM Nancy Carpio discharge to home/self care  Follow-up Information     Follow up With Specialties Details Why Contact Ko Newman DO Family Medicine Schedule an appointment as soon as possible for a visit in 1 week For further evaluation [address]  [phone]            Discharge Medication List as of 10/7/2019  5:20 PM      START taking these medications    Details   albuterol (PROVENTIL HFA,VENTOLIN HFA) 90 mcg/act inhaler Inhale 2 puffs every 4 (four) hours as needed for wheezing (or cough), Starting Mon 10/7/2019, Normal      doxycycline hyclate (VIBRAMYCIN) 100 mg capsule Take 1 capsule (100 mg total) by mouth 2 (two) times a day for 10 days, Starting Thu 10/10/2019, Until Sun 10/20/2019, Normal      HYDROcodone-homatropine (HYCODAN) 5-1 5 mg/5 mL syrup Take 5 mL by mouth daily at bedtime as needed for cough (Do not drive or drink alcohol while using  )Max Daily Amount: 5 mL, Starting Mon 10/7/2019, Normal      ondansetron (ZOFRAN-ODT) 8 mg disintegrating tablet Take 1 tablet (8 mg total) by mouth every 8 (eight) hours as needed for nausea or vomiting, Starting Mon 10/7/2019, Normal         CONTINUE these medications which have NOT CHANGED    Details   diazepam (VALIUM) 10 mg tablet TAKE 1-2 TABLETS BY MOUTH 1-2 HOURS BEFORE PROCEDURE, Historical Med      ergocalciferol (VITAMIN D2) 50,000 units Starting Sun 10/7/2018, Historical Med      gabapentin (NEURONTIN) 300 mg capsule TAKE 1 CAPSULE BY MOUTH 3 TO 4 TIMES A DAY, Historical Med      levonorgestrel (MIRENA) 20 MCG/24HR IUD 1 each by Intrauterine route once, Historical Med      oxyCODONE-acetaminophen (PERCOCET)  mg per tablet take 1 tablet by mouth every 6 hours if needed severe pain, Historical Med      butalbital-acetaminophen-caffeine ([address] [facility]) -40 mg per tablet Take 1 tablet by mouth every 4 (four) hours as needed for headaches, Historical Med      fluticasone (FLONASE) 50 mcg/act nasal spray 1 spray into each nostril daily for 7 days, Starting Fri 12/28/2018, Until Mon 5/6/2019, Normal      naproxen (NAPROSYN) 500 mg tablet Take 1 tablet (500 mg total) by mouth 2 (two) times a day with meals, Starting Mon 5/6/2019, Print           No discharge procedures on file      ED Provider  Electronically Signed by           Jh Mendoza DO  10/07/19 194

## 2019-10-25 ENCOUNTER — TRANSCRIBE ORDERS (OUTPATIENT)
Dept: ADMINISTRATIVE | Facility: HOSPITAL | Age: 34
End: 2019-10-25

## 2019-11-06 ENCOUNTER — TRANSCRIBE ORDERS (OUTPATIENT)
Dept: ADMINISTRATIVE | Facility: HOSPITAL | Age: 34
End: 2019-11-06

## 2019-11-06 DIAGNOSIS — IMO0001 ASYMMETRICAL LEFT SENSORINEURAL HEARING LOSS: Primary | ICD-10-CM

## 2019-12-09 NOTE — PROGRESS NOTES
PT DISCHARGE  The patient had her PT IE on 18 and she was seen in PT for this visit only  She did not return for more treatment  Unable to assess her current status, refer to her PT IE for her final assessment  She did not meet her goals secondary to being seen for this visit only  Unable to keep patient on hold, D/C PT secondary to script  and patient non-compliant with PT visits  D/C PT

## 2020-01-05 ENCOUNTER — HOSPITAL ENCOUNTER (EMERGENCY)
Facility: HOSPITAL | Age: 35
Discharge: HOME | End: 2020-01-06
Attending: EMERGENCY MEDICINE
Payer: COMMERCIAL

## 2020-01-05 DIAGNOSIS — R51.9 RECURRENT HEADACHE: Primary | ICD-10-CM

## 2020-01-05 LAB — B-HCG SERPL-ACNC: <2 MIU/ML

## 2020-01-05 PROCEDURE — 96365 THER/PROPH/DIAG IV INF INIT: CPT

## 2020-01-05 PROCEDURE — 96375 TX/PRO/DX INJ NEW DRUG ADDON: CPT

## 2020-01-05 PROCEDURE — 99284 EMERGENCY DEPT VISIT MOD MDM: CPT | Performed by: EMERGENCY MEDICINE

## 2020-01-05 PROCEDURE — 36415 COLL VENOUS BLD VENIPUNCTURE: CPT | Performed by: EMERGENCY MEDICINE

## 2020-01-05 PROCEDURE — 84702 CHORIONIC GONADOTROPIN TEST: CPT | Performed by: EMERGENCY MEDICINE

## 2020-01-05 PROCEDURE — 99283 EMERGENCY DEPT VISIT LOW MDM: CPT

## 2020-01-05 PROCEDURE — 96366 THER/PROPH/DIAG IV INF ADDON: CPT

## 2020-01-05 RX ORDER — MAGNESIUM SULFATE HEPTAHYDRATE 40 MG/ML
2 INJECTION, SOLUTION INTRAVENOUS ONCE
Status: COMPLETED | OUTPATIENT
Start: 2020-01-05 | End: 2020-01-06

## 2020-01-05 RX ORDER — KETOROLAC TROMETHAMINE 30 MG/ML
30 INJECTION, SOLUTION INTRAMUSCULAR; INTRAVENOUS ONCE
Status: COMPLETED | OUTPATIENT
Start: 2020-01-05 | End: 2020-01-05

## 2020-01-05 RX ORDER — METOCLOPRAMIDE HYDROCHLORIDE 5 MG/ML
10 INJECTION INTRAMUSCULAR; INTRAVENOUS ONCE
Status: COMPLETED | OUTPATIENT
Start: 2020-01-05 | End: 2020-01-05

## 2020-01-05 RX ORDER — DIPHENHYDRAMINE HYDROCHLORIDE 50 MG/ML
25 INJECTION INTRAMUSCULAR; INTRAVENOUS ONCE
Status: COMPLETED | OUTPATIENT
Start: 2020-01-05 | End: 2020-01-05

## 2020-01-05 RX ORDER — DEXAMETHASONE SODIUM PHOSPHATE 10 MG/ML
10 INJECTION, SOLUTION INTRAMUSCULAR; INTRAVENOUS ONCE
Status: COMPLETED | OUTPATIENT
Start: 2020-01-05 | End: 2020-01-05

## 2020-01-05 RX ORDER — BUTALBITAL, ACETAMINOPHEN AND CAFFEINE 50; 325; 40 MG/1; MG/1; MG/1
1 TABLET ORAL ONCE
Status: COMPLETED | OUTPATIENT
Start: 2020-01-06 | End: 2020-01-06

## 2020-01-05 RX ADMIN — METOCLOPRAMIDE 10 MG: 5 INJECTION, SOLUTION INTRAMUSCULAR; INTRAVENOUS at 23:04

## 2020-01-05 RX ADMIN — DEXAMETHASONE SODIUM PHOSPHATE 10 MG: 10 INJECTION, SOLUTION INTRAMUSCULAR; INTRAVENOUS at 23:01

## 2020-01-05 RX ADMIN — SODIUM CHLORIDE 1000 ML: 0.9 INJECTION, SOLUTION INTRAVENOUS at 22:52

## 2020-01-05 RX ADMIN — MAGNESIUM SULFATE HEPTAHYDRATE 2 G: 40 INJECTION, SOLUTION INTRAVENOUS at 22:58

## 2020-01-05 RX ADMIN — KETOROLAC TROMETHAMINE 30 MG: 30 INJECTION INTRAMUSCULAR; INTRAVENOUS at 23:30

## 2020-01-05 RX ADMIN — DIPHENHYDRAMINE HYDROCHLORIDE 25 MG: 50 INJECTION INTRAMUSCULAR; INTRAVENOUS at 22:59

## 2020-01-06 VITALS
OXYGEN SATURATION: 95 % | BODY MASS INDEX: 39.48 KG/M2 | WEIGHT: 251.54 LBS | TEMPERATURE: 98.9 F | RESPIRATION RATE: 18 BRPM | SYSTOLIC BLOOD PRESSURE: 143 MMHG | HEART RATE: 95 BPM | HEIGHT: 67 IN | DIASTOLIC BLOOD PRESSURE: 81 MMHG

## 2020-01-06 RX ORDER — BUTALBITAL, ACETAMINOPHEN AND CAFFEINE 50; 325; 40 MG/1; MG/1; MG/1
1 TABLET ORAL EVERY 6 HOURS PRN
Qty: 12 TABLET | Refills: 0 | Status: SHIPPED | OUTPATIENT
Start: 2020-01-06 | End: 2020-01-09

## 2020-01-06 RX ADMIN — BUTALBITAL, ACETAMINOPHEN, AND CAFFEINE 1 TABLET: 50; 325; 40 TABLET ORAL at 00:03

## 2020-01-06 NOTE — ED NOTES
Patient states she is hypersensitive to light, sounds, and smells  Patient placed in room with lights dimmed        Micaela Brooks RN  01/05/20 3144

## 2020-01-06 NOTE — ED NOTES
Patient states she has had a migraine that is behind her eyes for 5 days  Patient states this is typical of her usual migraines that she has had in the past  Patient states her symptoms have not resolved with over the counter medications  Patient states she is unable to see neurologist for months and wants relief        Fran Chong RN  01/05/20 7134

## 2020-01-06 NOTE — ED PROVIDER NOTES
History  Chief Complaint   Patient presents with    Headache - Recurrent or Known Dx Migraines     patient reports a migraine for the past five days; mainly frontal with nausea and sensitivity to lights asnd sounds  patient has tried excedrin without relierf  Patient is a 69-year-old female with a history of cerebral cysts that have subsequently been drained approximately 3 years ago according to patient  She states since that time she has been experiencing migraines  She has appointment with a neurologist on the 15th of this month  She has not been on any preventative medications  She states her family doctor does not feel comfortable prescribing her any preventative medications  She did not fall, hit her head  She has no fevers or chills  She states that this headache is very typical of her migraines where it is a diffuse headaches as throbbing pressure radiates down to the scar where her surgery was  She has no weakness or paresthesias throughout the bilateral upper extremities  She has no palpitations, syncope  She has no visual changes  She does report that the headache is worsened after spending too long at a computer on her phone        History provided by:  Patient  Headache   Pain location:  Generalized  Quality:  Sharp and dull  Radiates to:  R neck  Onset quality:  Gradual  Timing:  Constant  Progression:  Worsening  Chronicity:  Recurrent  Similar to prior headaches: yes    Context: bright light    Context: not activity, not caffeine and not coughing    Relieved by:  Nothing  Worsened by:  Nothing  Ineffective treatments:  NSAIDs and acetaminophen  Associated symptoms: photophobia    Associated symptoms: no abdominal pain, no back pain, no blurred vision, no congestion, no cough, no diarrhea, no dizziness, no drainage, no ear pain, no eye pain, no facial pain, no fatigue, no fever, no focal weakness, no hearing loss, no loss of balance, no myalgias, no nausea, no near-syncope, no neck pain, no neck stiffness, no numbness, no paresthesias, no seizures, no sinus pressure, no sore throat, no swollen glands, no syncope, no tingling, no URI, no visual change, no vomiting and no weakness    Risk factors: no anger, no family hx of SAH, does not have insomnia and lifestyle not sedentary        Prior to Admission Medications   Prescriptions Last Dose Informant Patient Reported? Taking? HYDROcodone-homatropine (HYCODAN) 5-1 5 mg/5 mL syrup 2020 at Unknown time  No Yes   Sig: Take 5 mL by mouth daily at bedtime as needed for cough (Do not drive or drink alcohol while using  )Max Daily Amount: 5 mL   albuterol (PROVENTIL HFA,VENTOLIN HFA) 90 mcg/act inhaler Past Week at Unknown time  No Yes   Sig: Inhale 2 puffs every 4 (four) hours as needed for wheezing (or cough)   butalbital-acetaminophen-caffeine (FIORICET,ESGIC) -40 mg per tablet   Yes No   Sig: Take 1 tablet by mouth every 4 (four) hours as needed for headaches   diazepam (VALIUM) 10 mg tablet   Yes No   Sig: TAKE 1-2 TABLETS BY MOUTH 1-2 HOURS BEFORE PROCEDURE   ergocalciferol (VITAMIN D2) 50,000 units 2020 at Unknown time  Yes Yes   fluticasone (FLONASE) 50 mcg/act nasal spray   No No   Si spray into each nostril daily for 7 days   gabapentin (NEURONTIN) 300 mg capsule 2020 at Unknown time  Yes Yes   Sig: TAKE 1 CAPSULE BY MOUTH 3 TO 4 TIMES A DAY   levonorgestrel (MIRENA) 20 MCG/24HR IUD 2020 at Unknown time  Yes Yes   Si each by Intrauterine route once   naproxen (NAPROSYN) 500 mg tablet   No No   Sig: Take 1 tablet (500 mg total) by mouth 2 (two) times a day with meals   Patient not taking: Reported on 2019   ondansetron (ZOFRAN-ODT) 8 mg disintegrating tablet 2020 at Unknown time  No Yes   Sig: Take 1 tablet (8 mg total) by mouth every 8 (eight) hours as needed for nausea or vomiting   oxyCODONE-acetaminophen (PERCOCET)  mg per tablet 2020 at Unknown time  Yes Yes   Sig: take 1 tablet by mouth every 6 hours if needed severe pain      Facility-Administered Medications: None       Past Medical History:   Diagnosis Date    Anxiety     Arachnoid cyst     Chronic pain     back    Depression     Hypertension     Migraine     Mitral valve prolapse     Psychiatric disorder     Anxiety and Depression       Past Surgical History:   Procedure Laterality Date     SECTION      CHOLECYSTECTOMY      CRANIOTOMY Right 3/1/2017    Procedure: RIGHT IMAGE GUIDED RETROMASTOID CRANIOTOMY FOR FENESTRATION AND PLACEMENT OF INTERNAL CYSTO-SUBARACHNOID SHUNT;  Surgeon: Wilmer Dozier MD;  Location: [facility];  Service:    [facility] SURGERY      WISDOM TOOTH EXTRACTION         Family History   Problem Relation Age of Onset    Rheum arthritis Mother     Hypertension Mother     Breast cancer Mother     Polycystic ovary syndrome Mother     No Known Problems Brother     Asthma Son     Diabetes Maternal Grandmother     Thyroid disease Maternal Grandmother     Stroke Maternal Grandmother     Hypertension Maternal Grandmother     Rheum arthritis Maternal Grandfather     Lung cancer Maternal Grandfather     Hypertension Maternal Grandfather     COPD Maternal Grandfather     Asthma Son     Asthma Son     Ear Disease Son      I have reviewed and agree with the history as documented  Social History     Tobacco Use    Smoking status: Current Every Day Smoker     Packs/day: 0 50     Types: Cigarettes    Smokeless tobacco: Never Used   Substance Use Topics    Alcohol use: Yes     Comment: occasionally     Drug use: No        Review of Systems   Constitutional: Negative  Negative for diaphoresis, fatigue and fever  HENT: Negative  Negative for congestion, ear pain, hearing loss, postnasal drip, sinus pressure and sore throat  Eyes: Positive for photophobia  Negative for blurred vision, pain and visual disturbance  Respiratory: Negative    Negative for cough, chest tightness and shortness of breath  Cardiovascular: Negative  Negative for chest pain, palpitations, syncope and near-syncope  Gastrointestinal: Negative  Negative for abdominal pain, diarrhea, nausea and vomiting  Genitourinary: Negative  Negative for difficulty urinating and dysuria  Musculoskeletal: Negative  Negative for back pain, myalgias, neck pain and neck stiffness  Skin: Negative for rash  Neurological: Positive for headaches  Negative for dizziness, focal weakness, seizures, weakness, numbness, paresthesias and loss of balance  Hematological: Negative  Psychiatric/Behavioral: Negative  Negative for confusion  All other systems reviewed and are negative  Physical Exam  Physical Exam   Constitutional: She is oriented to person, place, and time  She appears well-developed and well-nourished  No distress  HENT:   Head: Normocephalic and atraumatic  Mouth/Throat: Oropharynx is clear and moist    Patient maintaining airway maintaining secretions   Eyes: Pupils are equal, round, and reactive to light  Conjunctivae and EOM are normal    Neck: Normal range of motion  Neck supple  Cardiovascular: Regular rhythm, normal heart sounds and intact distal pulses  Tachycardia present  No murmur heard  Pulses:       Radial pulses are 2+ on the right side, and 2+ on the left side  Dorsalis pedis pulses are 2+ on the right side, and 2+ on the left side  Pulmonary/Chest: Effort normal and breath sounds normal  No stridor  No respiratory distress  Abdominal: Soft  Bowel sounds are normal  She exhibits no distension  There is no tenderness  Musculoskeletal: Normal range of motion  She exhibits no edema  Neurological: She is alert and oriented to person, place, and time  No cranial nerve deficit  GCS eye subscore is 4  GCS verbal subscore is 5  GCS motor subscore is 6  Reflex Scores:       Patellar reflexes are 2+ on the right side and 2+ on the left side         Achilles reflexes are 2+ on the right side and 2+ on the left side  No slurred speech  No facial asymmetry  No ataxia  Negative pronator drift  Skin: Skin is warm  Capillary refill takes less than 2 seconds  She is not diaphoretic  Psychiatric: She has a normal mood and affect  Nursing note and vitals reviewed        Vital Signs  ED Triage Vitals [01/05/20 2237]   Temperature Pulse Respirations Blood Pressure SpO2   98 9 °F (37 2 °C) (!) 109 18 157/96 97 %      Temp Source Heart Rate Source Patient Position - Orthostatic VS BP Location FiO2 (%)   Temporal Monitor Lying Left arm --      Pain Score       9           Vitals:    01/05/20 2237   BP: 157/96   Pulse: (!) 109   Patient Position - Orthostatic VS: Lying         Visual Acuity  Visual Acuity      Most Recent Value   L Pupil Size (mm)  3   R Pupil Size (mm)  3          ED Medications  Medications   magnesium sulfate 2 g/50 mL IVPB (premix) 2 g (2 g Intravenous New Bag 1/5/20 2258)   sodium chloride 0 9 % bolus 1,000 mL (1,000 mL Intravenous New Bag 1/5/20 2252)   metoclopramide (REGLAN) injection 10 mg (10 mg Intravenous Given 1/5/20 2304)   diphenhydrAMINE (BENADRYL) injection 25 mg (25 mg Intravenous Given 1/5/20 2259)   dexamethasone (PF) (DECADRON) injection 10 mg (10 mg Intravenous Given 1/5/20 2301)   ketorolac (TORADOL) injection 30 mg (30 mg Intravenous Given 1/5/20 2330)   butalbital-acetaminophen-caffeine (FIORICET,ESGIC) -40 mg per tablet 1 tablet (1 tablet Oral Given 1/6/20 0003)       Diagnostic Studies  Results Reviewed     Procedure Component Value Units Date/Time    Quantitative hCG [[id number]]  (Normal) Collected:  01/05/20 2251    Lab Status:  Final result Specimen:  Blood from Arm, Right Updated:  01/05/20 2325     HCG, Quant <2 mIU/mL     Narrative:        Expected Ranges:     Approximate               Approximate HCG  Gestation age          Concentration ( mIU/mL)  _____________          ______________________   Juli Hunting                      HCG values  0 2-1 5-50  1-2                           2-3                         100-5000  3-4                         500-72478  4-5                         1000-59672  5-6                         [id number]  6-8                         [id number]  8-12                        [id number]                   No orders to display              Procedures  Procedures         ED Course  ED Course as of Jan 06 0008   Jhonny Man Jan 05, 2020   2244 Patient is a 22-year-old female coming in today with migraine  She states is similar to her migraines in the past   On exam she is neuro intact with no focal deficits  She does appear in pain however nontoxic appearing      Portions of the record may have been created with voice recognition software  Occasional wrong word or "sound a like" substitutions may have occurred due to the inherent limitations of voice recognition software  Read the chart carefully and recognize, using context, where substitutions have occurred  2352 Patient resting in bed  She states that she still has a headache but is mildly decreased  Will re-dose Reglan and Benadryl as well as start Fioricet    I did review  aware patient has been receiving oxycodone from the same provider  Out in [place] Jan 06, 2020   0006 Her RN patient's headache has subsequently improving  Will plan for DC                                  MDM  Number of Diagnoses or Management Options  Diagnosis management comments:     Patient's headache is similar to prior chronic headaches  There are no focal neurologic findings on exam   The headache was not sudden in onset and was not maximum intensity at onset  Patient does not have a fever and is not immunocompromised  Headache has not been progressively worsening  Patient denies jaw claudication, muscle aches or temporal artery pain  Doubt carbon monoxide poisoning as there are not multiple patients with similar complaints in the home    Patient is not pregnant and is not newly post pregnancy  Patient denies any history of clotting disorders  Patient denies trauma  Patient denies eye pain  Patient denies any cervical manipulation with facial pain or headache  Patient denies dizziness with headache  Amount and/or Complexity of Data Reviewed  Clinical lab tests: ordered  Tests in the radiology section of CPT®: ordered  Tests in the medicine section of CPT®: ordered          Disposition  Final diagnoses:   Recurrent headache     Time reflects when diagnosis was documented in both MDM as applicable and the Disposition within this note     Time User Action Codes Description Comment    1/6/2020 12:07 AM Yessica Arash Add [R51] Recurrent headache       ED Disposition     ED Disposition Condition Date/Time Comment    Discharge Stable Mon Jan 6, 2020 12:07 AM Evy Rasmussen discharge to home/self care  Follow-up Information     Follow up With Specialties Details Why Contact Info    Estephania Miller DO Family Medicine Schedule an appointment as soon as possible for a visit in 1 week  [address]  [phone]            Patient's Medications   Discharge Prescriptions    BUTALBITAL-ACETAMINOPHEN-CAFFEINE (FIORICET,ESGIC) -40 MG PER TABLET    Take 1 tablet by mouth every 6 (six) hours as needed for headaches for up to 3 days       Start Date: 1/6/2020  End Date: 1/9/2020       Order Dose: 1 tablet       Quantity: 12 tablet    Refills: 0     No discharge procedures on file      ED Provider  Electronically Signed by           Leopoldo Cong, DO  01/06/20 2688

## 2020-02-14 ENCOUNTER — HOSPITAL ENCOUNTER (OUTPATIENT)
Dept: MRI IMAGING | Facility: HOSPITAL | Age: 35
Discharge: HOME | End: 2020-02-14
Payer: COMMERCIAL

## 2020-02-14 DIAGNOSIS — IMO0001 ASYMMETRICAL LEFT SENSORINEURAL HEARING LOSS: ICD-10-CM

## 2020-02-14 PROCEDURE — A9585 GADOBUTROL INJECTION: HCPCS | Performed by: RADIOLOGY

## 2020-02-14 PROCEDURE — 70553 MRI BRAIN STEM W/O & W/DYE: CPT

## 2020-02-14 RX ADMIN — GADOBUTROL 11 ML: 604.72 INJECTION INTRAVENOUS at 13:39

## 2020-05-24 ENCOUNTER — HOSPITAL ENCOUNTER (EMERGENCY)
Facility: HOSPITAL | Age: 35
Discharge: HOME | End: 2020-05-24
Attending: EMERGENCY MEDICINE | Admitting: EMERGENCY MEDICINE
Payer: COMMERCIAL

## 2020-05-24 ENCOUNTER — APPOINTMENT (EMERGENCY)
Dept: CT IMAGING | Facility: HOSPITAL | Age: 35
End: 2020-05-24
Payer: COMMERCIAL

## 2020-05-24 VITALS
HEART RATE: 102 BPM | BODY MASS INDEX: 39.47 KG/M2 | DIASTOLIC BLOOD PRESSURE: 90 MMHG | WEIGHT: 251.99 LBS | SYSTOLIC BLOOD PRESSURE: 140 MMHG | RESPIRATION RATE: 18 BRPM | TEMPERATURE: 98.2 F | OXYGEN SATURATION: 100 %

## 2020-05-24 DIAGNOSIS — N39.0 UTI (URINARY TRACT INFECTION): ICD-10-CM

## 2020-05-24 DIAGNOSIS — K64.9 HEMORRHOID: ICD-10-CM

## 2020-05-24 DIAGNOSIS — R16.1 SPLENOMEGALY: ICD-10-CM

## 2020-05-24 DIAGNOSIS — R16.0 HEPATOMEGALY: Primary | ICD-10-CM

## 2020-05-24 DIAGNOSIS — K57.90 DIVERTICULOSIS: ICD-10-CM

## 2020-05-24 LAB
ALBUMIN SERPL BCP-MCNC: 3.6 G/DL (ref 3.5–5)
ALP SERPL-CCNC: 98 U/L (ref 46–116)
ALT SERPL W P-5'-P-CCNC: 29 U/L (ref 12–78)
ANION GAP SERPL CALCULATED.3IONS-SCNC: 6 MMOL/L (ref 4–13)
APTT PPP: 33 SECONDS (ref 23–37)
AST SERPL W P-5'-P-CCNC: 17 U/L (ref 5–45)
BACTERIA UR QL AUTO: ABNORMAL /HPF
BASOPHILS # BLD AUTO: 0.09 THOUSANDS/ΜL (ref 0–0.1)
BASOPHILS NFR BLD AUTO: 1 % (ref 0–1)
BILIRUB SERPL-MCNC: 0.4 MG/DL (ref 0.2–1)
BILIRUB UR QL STRIP: NEGATIVE
BUN SERPL-MCNC: 6 MG/DL (ref 5–25)
CALCIUM SERPL-MCNC: 8.6 MG/DL (ref 8.3–10.1)
CHLORIDE SERPL-SCNC: 101 MMOL/L (ref 100–108)
CLARITY UR: ABNORMAL
CO2 SERPL-SCNC: 30 MMOL/L (ref 21–32)
COLOR UR: ABNORMAL
CREAT SERPL-MCNC: 0.77 MG/DL (ref 0.6–1.3)
EOSINOPHIL # BLD AUTO: 0.17 THOUSAND/ΜL (ref 0–0.61)
EOSINOPHIL NFR BLD AUTO: 1 % (ref 0–6)
ERYTHROCYTE [DISTWIDTH] IN BLOOD BY AUTOMATED COUNT: 12.2 % (ref 11.6–15.1)
EXT PREG TEST URINE: NEGATIVE
EXT. CONTROL ED NAV: NORMAL
GFR SERPL CREATININE-BSD FRML MDRD: 99 ML/MIN/1.73SQ M
GLUCOSE SERPL-MCNC: 180 MG/DL (ref 65–140)
GLUCOSE UR STRIP-MCNC: NEGATIVE MG/DL
HCT VFR BLD AUTO: 39.7 % (ref 34.8–46.1)
HGB BLD-MCNC: 12.7 G/DL (ref 11.5–15.4)
HGB UR QL STRIP.AUTO: ABNORMAL
IMM GRANULOCYTES # BLD AUTO: 0.09 THOUSAND/UL (ref 0–0.2)
IMM GRANULOCYTES NFR BLD AUTO: 1 % (ref 0–2)
INR PPP: 0.98 (ref 0.84–1.19)
KETONES UR STRIP-MCNC: NEGATIVE MG/DL
LACTATE SERPL-SCNC: 1.4 MMOL/L (ref 0.5–2)
LEUKOCYTE ESTERASE UR QL STRIP: ABNORMAL
LIPASE SERPL-CCNC: 119 U/L (ref 73–393)
LYMPHOCYTES # BLD AUTO: 4.17 THOUSANDS/ΜL (ref 0.6–4.47)
LYMPHOCYTES NFR BLD AUTO: 30 % (ref 14–44)
MCH RBC QN AUTO: 28.2 PG (ref 26.8–34.3)
MCHC RBC AUTO-ENTMCNC: 32 G/DL (ref 31.4–37.4)
MCV RBC AUTO: 88 FL (ref 82–98)
MONOCYTES # BLD AUTO: 0.8 THOUSAND/ΜL (ref 0.17–1.22)
MONOCYTES NFR BLD AUTO: 6 % (ref 4–12)
NEUTROPHILS # BLD AUTO: 8.66 THOUSANDS/ΜL (ref 1.85–7.62)
NEUTS SEG NFR BLD AUTO: 61 % (ref 43–75)
NITRITE UR QL STRIP: NEGATIVE
NON-SQ EPI CELLS URNS QL MICRO: ABNORMAL /HPF
NRBC BLD AUTO-RTO: 0 /100 WBCS
PH UR STRIP.AUTO: 6.5 [PH]
PLATELET # BLD AUTO: 297 THOUSANDS/UL (ref 149–390)
PMV BLD AUTO: 10.9 FL (ref 8.9–12.7)
POTASSIUM SERPL-SCNC: 3.4 MMOL/L (ref 3.5–5.3)
PROT SERPL-MCNC: 7 G/DL (ref 6.4–8.2)
PROT UR STRIP-MCNC: NEGATIVE MG/DL
PROTHROMBIN TIME: 13 SECONDS (ref 11.6–14.5)
RBC # BLD AUTO: 4.5 MILLION/UL (ref 3.81–5.12)
RBC #/AREA URNS AUTO: ABNORMAL /HPF
SODIUM SERPL-SCNC: 137 MMOL/L (ref 136–145)
SP GR UR STRIP.AUTO: 1.01 (ref 1–1.03)
TROPONIN I SERPL-MCNC: <0.02 NG/ML
UROBILINOGEN UR QL STRIP.AUTO: 0.2 E.U./DL
WBC # BLD AUTO: 13.98 THOUSAND/UL (ref 4.31–10.16)
WBC #/AREA URNS AUTO: ABNORMAL /HPF

## 2020-05-24 PROCEDURE — 83605 ASSAY OF LACTIC ACID: CPT | Performed by: EMERGENCY MEDICINE

## 2020-05-24 PROCEDURE — 84484 ASSAY OF TROPONIN QUANT: CPT | Performed by: EMERGENCY MEDICINE

## 2020-05-24 PROCEDURE — 99285 EMERGENCY DEPT VISIT HI MDM: CPT

## 2020-05-24 PROCEDURE — 93005 ELECTROCARDIOGRAM TRACING: CPT

## 2020-05-24 PROCEDURE — 85610 PROTHROMBIN TIME: CPT | Performed by: EMERGENCY MEDICINE

## 2020-05-24 PROCEDURE — 81025 URINE PREGNANCY TEST: CPT | Performed by: EMERGENCY MEDICINE

## 2020-05-24 PROCEDURE — 80053 COMPREHEN METABOLIC PANEL: CPT | Performed by: EMERGENCY MEDICINE

## 2020-05-24 PROCEDURE — 85730 THROMBOPLASTIN TIME PARTIAL: CPT | Performed by: EMERGENCY MEDICINE

## 2020-05-24 PROCEDURE — 87086 URINE CULTURE/COLONY COUNT: CPT | Performed by: EMERGENCY MEDICINE

## 2020-05-24 PROCEDURE — 99285 EMERGENCY DEPT VISIT HI MDM: CPT | Performed by: EMERGENCY MEDICINE

## 2020-05-24 PROCEDURE — 85025 COMPLETE CBC W/AUTO DIFF WBC: CPT | Performed by: EMERGENCY MEDICINE

## 2020-05-24 PROCEDURE — 83690 ASSAY OF LIPASE: CPT | Performed by: EMERGENCY MEDICINE

## 2020-05-24 PROCEDURE — 36415 COLL VENOUS BLD VENIPUNCTURE: CPT | Performed by: EMERGENCY MEDICINE

## 2020-05-24 PROCEDURE — 74177 CT ABD & PELVIS W/CONTRAST: CPT

## 2020-05-24 PROCEDURE — 96360 HYDRATION IV INFUSION INIT: CPT

## 2020-05-24 PROCEDURE — 81001 URINALYSIS AUTO W/SCOPE: CPT | Performed by: EMERGENCY MEDICINE

## 2020-05-24 RX ORDER — CEPHALEXIN 500 MG/1
500 CAPSULE ORAL EVERY 6 HOURS SCHEDULED
Qty: 28 CAPSULE | Refills: 0 | Status: SHIPPED | OUTPATIENT
Start: 2020-05-24 | End: 2020-05-31

## 2020-05-24 RX ORDER — TOPIRAMATE 25 MG/1
25 TABLET ORAL 2 TIMES DAILY
COMMUNITY
Start: 2020-01-15

## 2020-05-24 RX ORDER — SUMATRIPTAN 100 MG/1
TABLET, FILM COATED ORAL AS NEEDED
COMMUNITY
Start: 2020-01-15

## 2020-05-24 RX ORDER — CEPHALEXIN 250 MG/1
500 CAPSULE ORAL ONCE
Status: COMPLETED | OUTPATIENT
Start: 2020-05-24 | End: 2020-05-24

## 2020-05-24 RX ORDER — DIPHENOXYLATE HYDROCHLORIDE AND ATROPINE SULFATE 2.5; .025 MG/1; MG/1
1 TABLET ORAL DAILY
COMMUNITY

## 2020-05-24 RX ADMIN — CEPHALEXIN 500 MG: 250 CAPSULE ORAL at 15:17

## 2020-05-24 RX ADMIN — IOHEXOL 100 ML: 350 INJECTION, SOLUTION INTRAVENOUS at 14:45

## 2020-05-24 RX ADMIN — SODIUM CHLORIDE 1000 ML: 0.9 INJECTION, SOLUTION INTRAVENOUS at 14:00

## 2020-05-25 LAB — BACTERIA UR CULT: NORMAL

## 2020-05-26 LAB
ATRIAL RATE: 100 BPM
P AXIS: 21 DEGREES
PR INTERVAL: 156 MS
QRS AXIS: 7 DEGREES
QRSD INTERVAL: 106 MS
QT INTERVAL: 352 MS
QTC INTERVAL: 454 MS
T WAVE AXIS: 19 DEGREES
VENTRICULAR RATE: 100 BPM

## 2020-05-26 PROCEDURE — 93010 ELECTROCARDIOGRAM REPORT: CPT | Performed by: INTERNAL MEDICINE

## 2020-07-02 ENCOUNTER — HOSPITAL ENCOUNTER (EMERGENCY)
Facility: HOSPITAL | Age: 35
Discharge: HOME | End: 2020-07-02
Attending: EMERGENCY MEDICINE
Payer: COMMERCIAL

## 2020-07-02 ENCOUNTER — APPOINTMENT (EMERGENCY)
Dept: RADIOLOGY | Facility: HOSPITAL | Age: 35
End: 2020-07-02
Payer: COMMERCIAL

## 2020-07-02 VITALS
HEART RATE: 109 BPM | RESPIRATION RATE: 18 BRPM | TEMPERATURE: 98.3 F | SYSTOLIC BLOOD PRESSURE: 146 MMHG | DIASTOLIC BLOOD PRESSURE: 88 MMHG | OXYGEN SATURATION: 100 % | WEIGHT: 243.17 LBS | BODY MASS INDEX: 38.09 KG/M2

## 2020-07-02 DIAGNOSIS — S61.512A LACERATION OF LEFT WRIST: Primary | ICD-10-CM

## 2020-07-02 PROCEDURE — 99283 EMERGENCY DEPT VISIT LOW MDM: CPT

## 2020-07-02 PROCEDURE — 90471 IMMUNIZATION ADMIN: CPT

## 2020-07-02 PROCEDURE — 99284 EMERGENCY DEPT VISIT MOD MDM: CPT | Performed by: PHYSICIAN ASSISTANT

## 2020-07-02 PROCEDURE — 90715 TDAP VACCINE 7 YRS/> IM: CPT | Performed by: PHYSICIAN ASSISTANT

## 2020-07-02 PROCEDURE — 12001 RPR S/N/AX/GEN/TRNK 2.5CM/<: CPT | Performed by: PHYSICIAN ASSISTANT

## 2020-07-02 PROCEDURE — 73110 X-RAY EXAM OF WRIST: CPT

## 2020-07-02 RX ADMIN — TETANUS TOXOID, REDUCED DIPHTHERIA TOXOID AND ACELLULAR PERTUSSIS VACCINE, ADSORBED 0.5 ML: 5; 2.5; 8; 8; 2.5 SUSPENSION INTRAMUSCULAR at 20:25

## 2020-07-03 NOTE — ED PROVIDER NOTES
History  Chief Complaint   Patient presents with    Laceration     left wrist, stabbed self with knife while cleaning at work     Patient presents to the emergency department today for evaluation a laceration that she sustained on left wrist while she was at work while cleaning a butter knife  Laceration is nonbleeding at this point that was controlled with direct pressure  Tetanus is not up today  She admits to some localized numbness  No other injuries associated with this incident  Denies range of motion deficits or finger numbness  Prior to Admission Medications   Prescriptions Last Dose Informant Patient Reported? Taking?    SUMAtriptan (IMITREX) 100 mg tablet Not Taking at Unknown time  Yes No   Sig: as needed   albuterol (PROVENTIL HFA,VENTOLIN HFA) 90 mcg/act inhaler Not Taking at Unknown time  No No   Sig: Inhale 2 puffs every 4 (four) hours as needed for wheezing (or cough)   Patient not taking: Reported on 2020   butalbital-acetaminophen-caffeine (FIORICET,ESGIC) -40 mg per tablet   Yes No   Sig: Take 1 tablet by mouth every 4 (four) hours as needed for headaches   diazepam (VALIUM) 10 mg tablet   Yes No   Sig: TAKE 1-2 TABLETS BY MOUTH 1-2 HOURS BEFORE PROCEDURE   ergocalciferol (VITAMIN D2) 50,000 units   Yes No   Sig: Take 50,000 Units by mouth once a week Every Wednesday   fluticasone (FLONASE) 50 mcg/act nasal spray   No No   Si spray into each nostril daily for 7 days   gabapentin (NEURONTIN) 300 mg capsule 2020 at Unknown time  Yes Yes   Sig: TAKE 1 CAPSULE BY MOUTH 3 TO 4 TIMES A DAY   levonorgestrel (MIRENA) 20 MCG/24HR IUD 2020 at Unknown time  Yes Yes   Si each by Intrauterine route once   multivitamin (THERAGRAN) TABS 2020 at Unknown time  Yes Yes   Sig: Take 1 tablet by mouth daily   oxyCODONE-acetaminophen (PERCOCET)  mg per tablet   Yes No   Sig: take 1 tablet by mouth every 6 hours if needed severe pain   topiramate (TOPAMAX) 25 mg tablet 2020 at Unknown time  Yes Yes   Sig: Take 25 mg by mouth 2 (two) times a day      Facility-Administered Medications: None       Past Medical History:   Diagnosis Date    Anxiety     Arachnoid cyst     Chronic pain     back    Depression     Hypertension     Migraine     Mitral valve prolapse     Psychiatric disorder     Anxiety and Depression       Past Surgical History:   Procedure Laterality Date     SECTION      CHOLECYSTECTOMY      CRANIOTOMY Right 3/1/2017    Procedure: RIGHT IMAGE GUIDED RETROMASTOID CRANIOTOMY FOR FENESTRATION AND PLACEMENT OF INTERNAL CYSTO-SUBARACHNOID SHUNT;  Surgeon: Sienna Livingston MD;  Location: [facility];  Service:    [facility] MOUTH SURGERY      WISDOM TOOTH EXTRACTION         Family History   Problem Relation Age of Onset    Rheum arthritis Mother     Hypertension Mother     Breast cancer Mother     Polycystic ovary syndrome Mother     No Known Problems Brother     Asthma Son     Diabetes Maternal Grandmother     Thyroid disease Maternal Grandmother     Stroke Maternal Grandmother     Hypertension Maternal Grandmother     Rheum arthritis Maternal Grandfather     Lung cancer Maternal Grandfather     Hypertension Maternal Grandfather     COPD Maternal Grandfather     Asthma Son     Asthma Son     Ear Disease Son      I have reviewed and agree with the history as documented  E-Cigarette/Vaping     E-Cigarette/Vaping Substances     Social History     Tobacco Use    Smoking status: Current Every Day Smoker     Packs/day: 0 50     Types: Cigarettes    Smokeless tobacco: Never Used   Substance Use Topics    Alcohol use: Yes     Comment: occasionally     Drug use: No       Review of Systems   Constitutional: Negative  Negative for chills and fever  HENT: Negative  Negative for sore throat and trouble swallowing  Eyes: Negative  Respiratory: Negative  Negative for cough, shortness of breath and wheezing  Cardiovascular: Negative  Negative for chest pain and leg swelling  Gastrointestinal: Negative  Negative for abdominal pain, blood in stool and vomiting  Endocrine: Negative  Genitourinary: Negative  Musculoskeletal: Negative  Negative for neck stiffness  Skin: Positive for wound  Left wrist laceration   Allergic/Immunologic: Negative  Neurological: Negative  Negative for dizziness, seizures, speech difficulty, weakness, light-headedness, numbness and headaches  Hematological: Negative  Psychiatric/Behavioral: Negative  All other systems reviewed and are negative  Physical Exam  Physical Exam   Constitutional: She appears well-developed and well-nourished  No distress  HENT:   Head: Normocephalic  Eyes: Pupils are equal, round, and reactive to light  Cardiovascular: Normal rate  Pulmonary/Chest: Effort normal    Skin: Capillary refill takes less than 2 seconds  She is not diaphoretic  2 cm laceration noted of the left wrist medially  Appears superficial   No active bleeding  Will require closure for better approximation  Does not appear dirty  Psychiatric: She has a normal mood and affect  Vitals reviewed        Vital Signs  ED Triage Vitals [07/02/20 2002]   Temperature Pulse Respirations Blood Pressure SpO2   98 3 °F (36 8 °C) (!) 115 18 149/96 99 %      Temp Source Heart Rate Source Patient Position - Orthostatic VS BP Location FiO2 (%)   Temporal Monitor Sitting Right arm --      Pain Score       Worst Possible Pain           Vitals:    07/02/20 2002   BP: 149/96   Pulse: (!) 115   Patient Position - Orthostatic VS: Sitting         Visual Acuity      ED Medications  Medications   tetanus-diphtheria-acellular pertussis (BOOSTRIX) IM injection 0 5 mL (0 5 mL Intramuscular Given 7/2/20 2025)       Diagnostic Studies  Results Reviewed     None                 XR wrist 3+ views LEFT   ED Interpretation by Roderick Mcconnell PA-C (07/02 2026)   No evidence of acute osseous abnormalities or radiopaque foreign bodies  Final Result by Mee Adame MD (07/02 2032)      No acute osseous abnormality  Workstation performed: [id number]                    Procedures  Laceration repair  Date/Time: 7/2/2020 8:39 PM  Performed by: Linette Morales PA-C  Authorized by: Linette Morales PA-C   Consent: Verbal consent obtained  Consent given by: patient  Patient understanding: patient states understanding of the procedure being performed  Radiology Images displayed and confirmed  If images not available, report reviewed: imaging studies available  Patient identity confirmed: verbally with patient  Body area: upper extremity  Location details: left wrist  Laceration length: 2 cm  Foreign bodies: no foreign bodies  Tendon involvement: none  Nerve involvement: none  Vascular damage: no  Anesthesia: local infiltration    Anesthesia:  Local Anesthetic: lidocaine 2% with epinephrine and lidocaine 1% with epinephrine  Anesthetic total: 2 mL    Sedation:  Patient sedated: no      Wound Dehiscence:  Superficial Wound Dehiscence: simple closure      Procedure Details:  Irrigation solution: saline  Irrigation method: jet lavage  Amount of cleaning: standard  Debridement: none  Degree of undermining: none  Skin closure: 5-0 nylon  Number of sutures: 3  Technique: simple  Approximation: close  Approximation difficulty: simple  Dressing: 4x4 sterile gauze  Patient tolerance: Patient tolerated the procedure well with no immediate complications               ED Course  ED Course as of Jul 02 2040 Thu Jul 02, 2020 2020 Blood Pressure: 149/96   2020 Temperature: 98 3 °F (36 8 °C)   2020 Pulse(!): 115   2020 Respirations: 18   2020 SpO2: 99 %       US AUDIT      Most Recent Value   Initial Alcohol Screen: US AUDIT-C    1  How often do you have a drink containing alcohol?  0 Filed at: 07/02/2020 2001   2  How many drinks containing alcohol do you have on a typical day you are drinking?    0 Filed at: 07/02/2020 2001 3b  FEMALE Any Age, or MALE 65+: How often do you have 4 or more drinks on one occassion? 0 Filed at: 07/02/2020 2001   Audit-C Score  0 Filed at: 07/02/2020 2001                  CHACHA/DAST-10      Most Recent Value   How many times in the past year have you    Used an illegal drug or used a prescription medication for non-medical reasons? Never Filed at: 07/02/2020 2001                                MDM      Disposition  Final diagnoses:   Laceration of left wrist     Time reflects when diagnosis was documented in both MDM as applicable and the Disposition within this note     Time User Action Codes Description Comment    7/2/2020  8:08 PM Tano Carlisle Add [G33 [id number]] Laceration of left wrist       ED Disposition     ED Disposition Condition Date/Time Comment    Discharge Stable Thu Jul 2, 2020  8:08 PM Ether Bairon discharge to home/self care  Follow-up Information     Follow up With Specialties Details Why Contact Info    Care now  Go to  For suture removal  in 7-10 days           Patient's Medications   Discharge Prescriptions    No medications on file     No discharge procedures on file      PDMP Review     None          ED Provider  Electronically Signed by           Ming Flowers PA-C  07/02/20 2040

## 2020-07-05 ENCOUNTER — HOSPITAL ENCOUNTER (EMERGENCY)
Facility: HOSPITAL | Age: 35
Discharge: HOME | End: 2020-07-05
Attending: EMERGENCY MEDICINE | Admitting: EMERGENCY MEDICINE
Payer: COMMERCIAL

## 2020-07-05 VITALS
OXYGEN SATURATION: 98 % | RESPIRATION RATE: 18 BRPM | DIASTOLIC BLOOD PRESSURE: 55 MMHG | WEIGHT: 248.02 LBS | HEART RATE: 102 BPM | BODY MASS INDEX: 38.93 KG/M2 | SYSTOLIC BLOOD PRESSURE: 97 MMHG | TEMPERATURE: 97.5 F | HEIGHT: 67 IN

## 2020-07-05 DIAGNOSIS — Z51.89 VISIT FOR WOUND CHECK: Primary | ICD-10-CM

## 2020-07-05 PROCEDURE — 99283 EMERGENCY DEPT VISIT LOW MDM: CPT

## 2020-07-05 PROCEDURE — 99281 EMR DPT VST MAYX REQ PHY/QHP: CPT | Performed by: EMERGENCY MEDICINE

## 2020-07-08 NOTE — ED PROVIDER NOTES
History  Chief Complaint   Patient presents with    Fever - 9 weeks to 74 years     Patient had a "fever" of 99 9 today, also concerned for infection in wound on left wrist     HPI  78-year-old woman presents for wound check  She had sutures placed on her left wrist a couple days ago  Then she felt hot at work today took her temperature and it was 99 so she thought she was having a fever secondary to an infection in her wrist   She denies any wrist pain, denies any discharge from the wound on her wrist     Prior to Admission Medications   Prescriptions Last Dose Informant Patient Reported? Taking?    SUMAtriptan (IMITREX) 100 mg tablet Not Taking at Unknown time  Yes No   Sig: as needed   albuterol (PROVENTIL HFA,VENTOLIN HFA) 90 mcg/act inhaler   No No   Sig: Inhale 2 puffs every 4 (four) hours as needed for wheezing (or cough)   Patient not taking: Reported on 2020   butalbital-acetaminophen-caffeine (FIORICET,ESGIC) -40 mg per tablet Not Taking at Unknown time  Yes No   Sig: Take 1 tablet by mouth every 4 (four) hours as needed for headaches   diazepam (VALIUM) 10 mg tablet   Yes No   Sig: TAKE 1-2 TABLETS BY MOUTH 1-2 HOURS BEFORE PROCEDURE   ergocalciferol (VITAMIN D2) 50,000 units   Yes No   Sig: Take 50,000 Units by mouth once a week Every Wednesday   fluticasone (FLONASE) 50 mcg/act nasal spray   No No   Si spray into each nostril daily for 7 days   gabapentin (NEURONTIN) 300 mg capsule   Yes No   Sig: TAKE 1 CAPSULE BY MOUTH 3 TO 4 TIMES A DAY   levonorgestrel (MIRENA) 20 MCG/24HR IUD   Yes No   Si each by Intrauterine route once   multivitamin (THERAGRAN) TABS   Yes No   Sig: Take 1 tablet by mouth daily   oxyCODONE-acetaminophen (PERCOCET)  mg per tablet   Yes No   Sig: take 1 tablet by mouth every 6 hours if needed severe pain   topiramate (TOPAMAX) 25 mg tablet   Yes No   Sig: Take 25 mg by mouth 2 (two) times a day      Facility-Administered Medications: None       Past Medical History:   Diagnosis Date    Anxiety     Arachnoid cyst     Chronic pain     back    Depression     Hypertension     Migraine     Mitral valve prolapse     Psychiatric disorder     Anxiety and Depression       Past Surgical History:   Procedure Laterality Date     SECTION      CHOLECYSTECTOMY      CRANIOTOMY Right 3/1/2017    Procedure: RIGHT IMAGE GUIDED RETROMASTOID CRANIOTOMY FOR FENESTRATION AND PLACEMENT OF INTERNAL CYSTO-SUBARACHNOID SHUNT;  Surgeon: Ankita Goldsmith MD;  Location: [facility];  Service:    Richie Channahon MOUTH SURGERY      WISDOM TOOTH EXTRACTION         Family History   Problem Relation Age of Onset    Rheum arthritis Mother     Hypertension Mother     Breast cancer Mother     Polycystic ovary syndrome Mother     No Known Problems Brother     Asthma Son     Diabetes Maternal Grandmother     Thyroid disease Maternal Grandmother     Stroke Maternal Grandmother     Hypertension Maternal Grandmother     Rheum arthritis Maternal Grandfather     Lung cancer Maternal Grandfather     Hypertension Maternal Grandfather     COPD Maternal Grandfather     Asthma Son     Asthma Son     Ear Disease Son      I have reviewed and agree with the history as documented  E-Cigarette/Vaping     E-Cigarette/Vaping Substances     Social History     Tobacco Use    Smoking status: Current Every Day Smoker     Packs/day: 0 50     Types: Cigarettes    Smokeless tobacco: Never Used   Substance Use Topics    Alcohol use: Yes     Comment: occasionally     Drug use: No       Review of Systems   Constitutional: Negative  Negative for chills and fever  HENT: Negative  Negative for congestion and sore throat  Eyes: Negative  Negative for discharge and redness  Respiratory: Negative  Negative for chest tightness and shortness of breath  Cardiovascular: Negative  Negative for chest pain and palpitations  Gastrointestinal: Negative    Negative for abdominal pain, nausea and vomiting  Endocrine: Negative  Negative for cold intolerance and polyphagia  Genitourinary: Negative  Negative for difficulty urinating and dysuria  Musculoskeletal: Negative  Negative for arthralgias and back pain  Skin: Positive for wound  Negative for color change  Allergic/Immunologic: Negative  Negative for environmental allergies  Neurological: Negative  Negative for dizziness, weakness and headaches  Hematological: Negative  Psychiatric/Behavioral: Negative  Negative for behavioral problems  The patient is not nervous/anxious  All other systems reviewed and are negative  Physical Exam  Physical Exam   Constitutional: She is oriented to person, place, and time  She appears well-developed and well-nourished  No distress  HENT:   Head: Normocephalic and atraumatic  Right Ear: External ear normal    Left Ear: External ear normal    Mouth/Throat: Oropharynx is clear and moist    Eyes: Pupils are equal, round, and reactive to light  Conjunctivae and EOM are normal  Right eye exhibits no discharge  Left eye exhibits no discharge  No scleral icterus  Neck: Normal range of motion  Neck supple  No tracheal deviation present  No thyromegaly present  Cardiovascular: Normal rate, regular rhythm and intact distal pulses  Exam reveals no gallop and no friction rub  No murmur heard  Pulmonary/Chest: Effort normal and breath sounds normal  No stridor  No respiratory distress  She has no wheezes  She has no rales  Abdominal: Soft  Bowel sounds are normal  She exhibits no distension  There is no tenderness  There is no rebound and no guarding  Musculoskeletal: Normal range of motion  She exhibits no edema or deformity  Neurological: She is alert and oriented to person, place, and time  No cranial nerve deficit  Skin: Skin is warm and dry  No rash noted  She is not diaphoretic  No erythema     The sutures on the left wrist are healing well, there is no induration, erythema, tenderness to palpation or discharge  Psychiatric: She has a normal mood and affect  Her behavior is normal  Thought content normal    Nursing note and vitals reviewed  Vital Signs  ED Triage Vitals [07/05/20 1746]   Temperature Pulse Respirations Blood Pressure SpO2   98 5 °F (36 9 °C) (!) 107 18 128/73 99 %      Temp Source Heart Rate Source Patient Position - Orthostatic VS BP Location FiO2 (%)   Temporal Monitor Lying Left arm --      Pain Score       --           Vitals:    07/05/20 1746 07/05/20 1830   BP: 128/73 97/55   Pulse: (!) 107 102   Patient Position - Orthostatic VS: Lying          Visual Acuity      ED Medications  Medications - No data to display    Diagnostic Studies  Results Reviewed     None                 No orders to display              Procedures  Procedures         ED Course       US AUDIT      Most Recent Value   Initial Alcohol Screen: US AUDIT-C    1  How often do you have a drink containing alcohol?  0 Filed at: 07/05/2020 1747   2  How many drinks containing alcohol do you have on a typical day you are drinking? 0 Filed at: 07/05/2020 1747   3b  FEMALE Any Age, or MALE 65+: How often do you have 4 or more drinks on one occassion? 0 Filed at: 07/05/2020 1747   Audit-C Score  0 Filed at: 07/05/2020 1747                  CHACHA/DAST-10      Most Recent Value   How many times in the past year have you    Used an illegal drug or used a prescription medication for non-medical reasons? Never Filed at: 07/05/2020 1747                                [facility]  Number of Diagnoses or Management Options  Visit for wound check:   Diagnosis management comments: Wound to left wrist is healing well  Patient is otherwise afebrile, will discharge patient          Disposition  Final diagnoses:   Visit for wound check     Time reflects when diagnosis was documented in both MDM as applicable and the Disposition within this note     Time User Action Codes Description Comment    7/5/2020  6:58 PM Johncas Radha Add [Z51 89] Visit for wound check       ED Disposition     ED Disposition Condition Date/Time Comment    Discharge Stable Sun Jul 5, 2020  6:58 PM Jillian Ellsworth discharge to home/self care              Follow-up Information     Follow up With Specialties Details Why Contact Info Additional Information    Héctor Aponte, DO Family Medicine Call in 1 day follow up being seen in the emergency department [facility] [phone]       [facility] Emergency Department Emergency Medicine Go to  As needed, If symptoms worsen Rhett Hernandez [id number] [phone] [place] [facility], [address] [address], [place], [place], [address]          Discharge Medication List as of 7/5/2020  7:00 PM      CONTINUE these medications which have NOT CHANGED    Details   albuterol (PROVENTIL HFA,VENTOLIN HFA) 90 mcg/act inhaler Inhale 2 puffs every 4 (four) hours as needed for wheezing (or cough), Starting Mon 10/7/2019, Normal      butalbital-acetaminophen-caffeine (FIORICET,ESGIC) -40 mg per tablet Take 1 tablet by mouth every 4 (four) hours as needed for headaches, Historical Med      diazepam (VALIUM) 10 mg tablet TAKE 1-2 TABLETS BY MOUTH 1-2 HOURS BEFORE PROCEDURE, Historical Med      ergocalciferol (VITAMIN D2) 50,000 units Take 50,000 Units by mouth once a week Every Wednesday, Starting Sun 10/7/2018, Historical Med      fluticasone (FLONASE) 50 mcg/act nasal spray 1 spray into each nostril daily for 7 days, Starting Fri 12/28/2018, Until Sun 5/24/2020, Normal      gabapentin (NEURONTIN) 300 mg capsule TAKE 1 CAPSULE BY MOUTH 3 TO 4 TIMES A DAY, Historical Med      levonorgestrel (MIRENA) 20 MCG/24HR IUD 1 each by Intrauterine route once, Historical Med      multivitamin (THERAGRAN) TABS Take 1 tablet by mouth daily, Historical Med      oxyCODONE-acetaminophen (PERCOCET)  mg per tablet take 1 tablet by mouth every 6 hours if needed severe pain, Historical Med      SUMAtriptan (IMITREX) 100 mg tablet as needed, Starting Wed 1/15/2020, Historical Med      topiramate (TOPAMAX) 25 mg tablet Take 25 mg by mouth 2 (two) times a day, Starting Wed 1/15/2020, Historical Med           No discharge procedures on file      PDMP Review     None          ED Provider  Electronically Signed by           Tosha Dee MD  07/08/20 0478

## 2021-12-14 ENCOUNTER — OFFICE VISIT (OUTPATIENT)
Dept: URGENT CARE | Facility: CLINIC | Age: 36
End: 2021-12-14
Payer: COMMERCIAL

## 2021-12-14 VITALS — TEMPERATURE: 97.7 F | HEART RATE: 90 BPM | RESPIRATION RATE: 18 BRPM | OXYGEN SATURATION: 99 %

## 2021-12-14 DIAGNOSIS — J06.9 ACUTE URI: Primary | ICD-10-CM

## 2021-12-14 PROCEDURE — 99283 EMERGENCY DEPT VISIT LOW MDM: CPT | Performed by: PHYSICIAN ASSISTANT

## 2021-12-14 PROCEDURE — 87636 SARSCOV2 & INF A&B AMP PRB: CPT | Performed by: PHYSICIAN ASSISTANT

## 2021-12-14 PROCEDURE — G0382 LEV 3 HOSP TYPE B ED VISIT: HCPCS | Performed by: PHYSICIAN ASSISTANT

## 2021-12-15 LAB
FLUAV RNA RESP QL NAA+PROBE: NEGATIVE
FLUBV RNA RESP QL NAA+PROBE: NEGATIVE
SARS-COV-2 RNA RESP QL NAA+PROBE: NEGATIVE

## 2022-10-05 ENCOUNTER — HOSPITAL ENCOUNTER (EMERGENCY)
Facility: HOSPITAL | Age: 37
Discharge: HOME | End: 2022-10-06
Attending: EMERGENCY MEDICINE
Payer: COMMERCIAL

## 2022-10-05 ENCOUNTER — APPOINTMENT (OUTPATIENT)
Dept: ULTRASOUND IMAGING | Facility: HOSPITAL | Age: 37
End: 2022-10-05
Payer: COMMERCIAL

## 2022-10-05 VITALS
HEIGHT: 68 IN | WEIGHT: 215 LBS | TEMPERATURE: 102 F | HEART RATE: 124 BPM | SYSTOLIC BLOOD PRESSURE: 124 MMHG | RESPIRATION RATE: 20 BRPM | OXYGEN SATURATION: 97 % | BODY MASS INDEX: 32.58 KG/M2 | DIASTOLIC BLOOD PRESSURE: 69 MMHG

## 2022-10-05 DIAGNOSIS — R50.9 FEVER: ICD-10-CM

## 2022-10-05 DIAGNOSIS — U07.1 COVID-19: Primary | ICD-10-CM

## 2022-10-05 DIAGNOSIS — R00.0 TACHYCARDIA: ICD-10-CM

## 2022-10-05 DIAGNOSIS — Z34.90 PREGNANCY: ICD-10-CM

## 2022-10-05 LAB
ABO GROUP BLD: NORMAL
ALBUMIN SERPL BCP-MCNC: 3.6 G/DL (ref 3.5–5)
ALP SERPL-CCNC: 78 U/L (ref 46–116)
ALT SERPL W P-5'-P-CCNC: 20 U/L (ref 12–78)
ANION GAP SERPL CALCULATED.3IONS-SCNC: 11 MMOL/L (ref 4–13)
AST SERPL W P-5'-P-CCNC: 13 U/L (ref 5–45)
B-HCG SERPL-ACNC: ABNORMAL MIU/ML (ref 0–11.6)
BASOPHILS # BLD AUTO: 0.04 THOUSANDS/ΜL (ref 0–0.1)
BASOPHILS NFR BLD AUTO: 1 % (ref 0–1)
BILIRUB SERPL-MCNC: 0.38 MG/DL (ref 0.2–1)
BILIRUB UR QL STRIP: NEGATIVE
BLD GP AB SCN SERPL QL: NEGATIVE
BUN SERPL-MCNC: 7 MG/DL (ref 5–25)
CALCIUM SERPL-MCNC: 9.5 MG/DL (ref 8.3–10.1)
CARDIAC TROPONIN I PNL SERPL HS: 2 NG/L
CHLORIDE SERPL-SCNC: 98 MMOL/L (ref 96–108)
CLARITY UR: CLEAR
CO2 SERPL-SCNC: 25 MMOL/L (ref 21–32)
COLOR UR: YELLOW
CREAT SERPL-MCNC: 0.69 MG/DL (ref 0.6–1.3)
D DIMER PPP FEU-MCNC: <0.27 UG/ML FEU
EOSINOPHIL # BLD AUTO: 0.05 THOUSAND/ΜL (ref 0–0.61)
EOSINOPHIL NFR BLD AUTO: 1 % (ref 0–6)
ERYTHROCYTE [DISTWIDTH] IN BLOOD BY AUTOMATED COUNT: 11.9 % (ref 11.6–15.1)
FLUAV RNA RESP QL NAA+PROBE: NEGATIVE
FLUBV RNA RESP QL NAA+PROBE: NEGATIVE
GFR SERPL CREATININE-BSD FRML MDRD: 110 ML/MIN/1.73SQ M
GLUCOSE SERPL-MCNC: 167 MG/DL (ref 65–140)
GLUCOSE UR STRIP-MCNC: NEGATIVE MG/DL
HCT VFR BLD AUTO: 36.7 % (ref 34.8–46.1)
HGB BLD-MCNC: 12.2 G/DL (ref 11.5–15.4)
HGB UR QL STRIP.AUTO: NEGATIVE
IMM GRANULOCYTES # BLD AUTO: 0.05 THOUSAND/UL (ref 0–0.2)
IMM GRANULOCYTES NFR BLD AUTO: 1 % (ref 0–2)
KETONES UR STRIP-MCNC: NEGATIVE MG/DL
LEUKOCYTE ESTERASE UR QL STRIP: NEGATIVE
LYMPHOCYTES # BLD AUTO: 0.49 THOUSANDS/ΜL (ref 0.6–4.47)
LYMPHOCYTES NFR BLD AUTO: 6 % (ref 14–44)
MCH RBC QN AUTO: 28.6 PG (ref 26.8–34.3)
MCHC RBC AUTO-ENTMCNC: 33.2 G/DL (ref 31.4–37.4)
MCV RBC AUTO: 86 FL (ref 82–98)
MONOCYTES # BLD AUTO: 0.99 THOUSAND/ΜL (ref 0.17–1.22)
MONOCYTES NFR BLD AUTO: 13 % (ref 4–12)
NEUTROPHILS # BLD AUTO: 5.98 THOUSANDS/ΜL (ref 1.85–7.62)
NEUTS SEG NFR BLD AUTO: 78 % (ref 43–75)
NITRITE UR QL STRIP: NEGATIVE
NRBC BLD AUTO-RTO: 0 /100 WBCS
PH UR STRIP.AUTO: 7 [PH]
PLATELET # BLD AUTO: 211 THOUSANDS/UL (ref 149–390)
PMV BLD AUTO: 11.4 FL (ref 8.9–12.7)
POTASSIUM SERPL-SCNC: 3.8 MMOL/L (ref 3.5–5.3)
PROT SERPL-MCNC: 7.3 G/DL (ref 6.4–8.4)
PROT UR STRIP-MCNC: NEGATIVE MG/DL
RBC # BLD AUTO: 4.26 MILLION/UL (ref 3.81–5.12)
RH BLD: POSITIVE
RSV RNA RESP QL NAA+PROBE: NEGATIVE
SARS-COV-2 RNA RESP QL NAA+PROBE: POSITIVE
SODIUM SERPL-SCNC: 134 MMOL/L (ref 135–147)
SP GR UR STRIP.AUTO: 1.01 (ref 1–1.03)
SPECIMEN EXPIRATION DATE: NORMAL
UROBILINOGEN UR QL STRIP.AUTO: 0.2 E.U./DL
WBC # BLD AUTO: 7.6 THOUSAND/UL (ref 4.31–10.16)

## 2022-10-05 PROCEDURE — 86850 RBC ANTIBODY SCREEN: CPT | Performed by: EMERGENCY MEDICINE

## 2022-10-05 PROCEDURE — 84484 ASSAY OF TROPONIN QUANT: CPT | Performed by: EMERGENCY MEDICINE

## 2022-10-05 PROCEDURE — 85025 COMPLETE CBC W/AUTO DIFF WBC: CPT | Performed by: EMERGENCY MEDICINE

## 2022-10-05 PROCEDURE — 76801 OB US < 14 WKS SINGLE FETUS: CPT

## 2022-10-05 PROCEDURE — 84702 CHORIONIC GONADOTROPIN TEST: CPT | Performed by: EMERGENCY MEDICINE

## 2022-10-05 PROCEDURE — 81003 URINALYSIS AUTO W/O SCOPE: CPT | Performed by: EMERGENCY MEDICINE

## 2022-10-05 PROCEDURE — 86901 BLOOD TYPING SEROLOGIC RH(D): CPT | Performed by: EMERGENCY MEDICINE

## 2022-10-05 PROCEDURE — 76801 OB US < 14 WKS SINGLE FETUS: CPT | Performed by: EMERGENCY MEDICINE

## 2022-10-05 PROCEDURE — 93005 ELECTROCARDIOGRAM TRACING: CPT

## 2022-10-05 PROCEDURE — 96361 HYDRATE IV INFUSION ADD-ON: CPT

## 2022-10-05 PROCEDURE — 99285 EMERGENCY DEPT VISIT HI MDM: CPT | Performed by: EMERGENCY MEDICINE

## 2022-10-05 PROCEDURE — 86900 BLOOD TYPING SEROLOGIC ABO: CPT | Performed by: EMERGENCY MEDICINE

## 2022-10-05 PROCEDURE — 96374 THER/PROPH/DIAG INJ IV PUSH: CPT

## 2022-10-05 PROCEDURE — 87086 URINE CULTURE/COLONY COUNT: CPT | Performed by: EMERGENCY MEDICINE

## 2022-10-05 PROCEDURE — 80053 COMPREHEN METABOLIC PANEL: CPT | Performed by: EMERGENCY MEDICINE

## 2022-10-05 PROCEDURE — 36415 COLL VENOUS BLD VENIPUNCTURE: CPT | Performed by: EMERGENCY MEDICINE

## 2022-10-05 PROCEDURE — 0241U HB NFCT DS VIR RESP RNA 4 TRGT: CPT | Performed by: EMERGENCY MEDICINE

## 2022-10-05 PROCEDURE — 85379 FIBRIN DEGRADATION QUANT: CPT | Performed by: EMERGENCY MEDICINE

## 2022-10-05 PROCEDURE — 99284 EMERGENCY DEPT VISIT MOD MDM: CPT

## 2022-10-05 RX ORDER — ONDANSETRON 2 MG/ML
4 INJECTION INTRAMUSCULAR; INTRAVENOUS ONCE
Status: COMPLETED | OUTPATIENT
Start: 2022-10-05 | End: 2022-10-05

## 2022-10-05 RX ORDER — ACETAMINOPHEN 325 MG/1
975 TABLET ORAL ONCE
Status: COMPLETED | OUTPATIENT
Start: 2022-10-05 | End: 2022-10-05

## 2022-10-05 RX ADMIN — ONDANSETRON 4 MG: 2 INJECTION INTRAMUSCULAR; INTRAVENOUS at 21:38

## 2022-10-05 RX ADMIN — ACETAMINOPHEN 975 MG: 325 TABLET ORAL at 21:36

## 2022-10-05 RX ADMIN — SODIUM CHLORIDE 1000 ML: 0.9 INJECTION, SOLUTION INTRAVENOUS at 21:35

## 2022-10-05 NOTE — Clinical Note
Shazia Green was seen and treated in our emergency department on 10/5/2022  Diagnosis:     Padma Velez    She may return on this date: If you have any questions or concerns, please don't hesitate to call        Carlo Chan MD    ______________________________           _______________          _______________  Hospital Representative                              Date                                Time

## 2022-10-05 NOTE — Clinical Note
Sherly Mercedes was seen and treated in our emergency department on 10/5/2022  Diagnosis:     Piper Lopez  may return to school on return date  She may return on this date: 10/11/2022    Patient was seen here in the Emergency Department on 10/06/22 and will be able to return to work on 10/11/22  If you have any questions or concerns, please don't hesitate to call        Esther Pérez RN    ______________________________           _______________          _______________  Hospital Representative                              Date                                Time

## 2022-10-06 RX ORDER — ACETAMINOPHEN 500 MG
500 TABLET ORAL EVERY 6 HOURS PRN
Qty: 30 TABLET | Refills: 0 | Status: SHIPPED | OUTPATIENT
Start: 2022-10-06

## 2022-10-06 NOTE — ED PROVIDER NOTES
History  Chief Complaint   Patient presents with    Fever - 9 weeks to 74 years     Started today wioth fever like symptoms and chills  Unable to take temp at home due to no thermometer  Been around people recently diagnosed with COVID  Some nausea and vomiting  2 months pregnant     29-year-old female, last menstrual period on , who presents for flu-like symptoms as well as abdominal cramping  Patient states that for the past 3 weeks or so, she has had worsening abdominal cramping, and states that she was recently exposed to someone with COVID several days ago and she has significantly gotten worse since then as well  She describes the abdominal cramping has the general lower abdomen, not focally on one side or the other  She states that she has not checked her temperature at home, but feels febrile and has had chills, she reports headache, no congestion, dry cough, no chest pain or shortness of breath, positive nausea and vomiting, no diarrhea, no urinary changes, no vaginal bleeding or discharge  No leg pain or swelling  She does report generalized myalgias  ROS otherwise negative  Prior to Admission Medications   Prescriptions Last Dose Informant Patient Reported? Taking?    SUMAtriptan (IMITREX) 100 mg tablet   Yes No   Sig: as needed   albuterol (PROVENTIL HFA,VENTOLIN HFA) 90 mcg/act inhaler   No No   Sig: Inhale 2 puffs every 4 (four) hours as needed for wheezing (or cough)   Patient not taking: Reported on 2020   butalbital-acetaminophen-caffeine (FIORICET,ESGIC) -40 mg per tablet   Yes No   Sig: Take 1 tablet by mouth every 4 (four) hours as needed for headaches   diazepam (VALIUM) 10 mg tablet   Yes No   Sig: TAKE 1-2 TABLETS BY MOUTH 1-2 HOURS BEFORE PROCEDURE   ergocalciferol (VITAMIN D2) 50,000 units   Yes No   Sig: Take 50,000 Units by mouth once a week Every Wednesday   fluticasone (FLONASE) 50 mcg/act nasal spray   No No   Si spray into each nostril daily for 7 days   gabapentin (NEURONTIN) 300 mg capsule   Yes No   Sig: TAKE 1 CAPSULE BY MOUTH 3 TO 4 TIMES A DAY   levonorgestrel (MIRENA) 20 MCG/24HR IUD   Yes No   Si each by Intrauterine route once   multivitamin (THERAGRAN) TABS   Yes No   Sig: Take 1 tablet by mouth daily   oxyCODONE-acetaminophen (PERCOCET)  mg per tablet   Yes No   Sig: take 1 tablet by mouth every 6 hours if needed severe pain   topiramate (TOPAMAX) 25 mg tablet   Yes No   Sig: Take 25 mg by mouth 2 (two) times a day      Facility-Administered Medications: None       Past Medical History:   Diagnosis Date    Anxiety     Arachnoid cyst     Chronic pain     back    Depression     Hypertension     Migraine     Mitral valve prolapse     Psychiatric disorder     Anxiety and Depression       Past Surgical History:   Procedure Laterality Date     SECTION      CHOLECYSTECTOMY      CRANIOTOMY Right 3/1/2017    Procedure: RIGHT IMAGE GUIDED RETROMASTOID CRANIOTOMY FOR FENESTRATION AND PLACEMENT OF INTERNAL CYSTO-SUBARACHNOID SHUNT;  Surgeon: Rudolph Monroy MD;  Location: [facility];  Service:    [facility] MOUTH SURGERY      WISDOM TOOTH EXTRACTION         Family History   Problem Relation Age of Onset    Rheum arthritis Mother     Hypertension Mother     Breast cancer Mother     Polycystic ovary syndrome Mother     No Known Problems Brother     Asthma Son     Diabetes Maternal Grandmother     Thyroid disease Maternal Grandmother     Stroke Maternal Grandmother     Hypertension Maternal Grandmother     Rheum arthritis Maternal Grandfather     Lung cancer Maternal Grandfather     Hypertension Maternal Grandfather     COPD Maternal Grandfather     Asthma Son     Asthma Son     Ear Disease Son      I have reviewed and agree with the history as documented      E-Cigarette/Vaping    E-Cigarette Use Current Some Day User      E-Cigarette/Vaping Substances    Nicotine Yes      Social History     Tobacco Use    Smoking status: Current Every Day Smoker     Packs/day: 0 50     Types: Cigarettes    Smokeless tobacco: Never Used   Vaping Use    Vaping Use: Some days    Substances: Nicotine   Substance Use Topics    Alcohol use: Yes     Comment: occasionally     Drug use: No       Review of Systems   Constitutional: Positive for chills and fever  HENT: Negative for congestion, rhinorrhea and sore throat  Respiratory: Positive for cough  Negative for shortness of breath  Cardiovascular: Negative for chest pain and palpitations  Gastrointestinal: Positive for abdominal pain, nausea and vomiting  Negative for constipation and diarrhea  Genitourinary: Negative for difficulty urinating and flank pain  Musculoskeletal: Positive for myalgias  Negative for arthralgias  Neurological: Positive for headaches  Negative for dizziness, weakness and light-headedness  Psychiatric/Behavioral: Negative for agitation, behavioral problems and confusion  All other systems reviewed and are negative  Physical Exam  Physical Exam  Constitutional:       Appearance: She is well-developed  HENT:      Head: Normocephalic and atraumatic  Right Ear: Tympanic membrane normal       Left Ear: Tympanic membrane normal       Nose: Nose normal       Mouth/Throat:      Mouth: Mucous membranes are moist    Eyes:      Pupils: Pupils are equal, round, and reactive to light  Cardiovascular:      Rate and Rhythm: Regular rhythm  Tachycardia present  Heart sounds: Normal heart sounds  No murmur heard  No friction rub  Pulmonary:      Effort: Pulmonary effort is normal  No respiratory distress  Breath sounds: Normal breath sounds  No wheezing or rales  Abdominal:      General: Bowel sounds are normal  There is no distension  Palpations: Abdomen is soft  Tenderness: There is abdominal tenderness  Comments: Mild tenderness of the lower abdomen without guarding or rigidity     Musculoskeletal:         General: Normal range of motion  Cervical back: Normal range of motion and neck supple  Skin:     General: Skin is warm  Neurological:      Mental Status: She is alert and oriented to person, place, and time  Coordination: Coordination normal    Psychiatric:         Behavior: Behavior normal          Thought Content:  Thought content normal          Judgment: Judgment normal          Vital Signs  ED Triage Vitals   Temperature Pulse Respirations Blood Pressure SpO2   10/05/22 2048 10/05/22 2048 10/05/22 2048 10/05/22 2050 10/05/22 2048   99 3 °F (37 4 °C) (!) 138 20 131/82 99 %      Temp Source Heart Rate Source Patient Position - Orthostatic VS BP Location FiO2 (%)   10/05/22 2048 10/05/22 2200 10/05/22 2200 10/05/22 2200 --   Temporal Monitor Lying Right arm       Pain Score       10/05/22 2048       9           Vitals:    10/05/22 2048 10/05/22 2050 10/05/22 2200   BP:  131/82 124/69   Pulse: (!) 138  (!) 124   Patient Position - Orthostatic VS:   Lying         Visual Acuity      ED Medications  Medications   sodium chloride 0 9 % bolus 1,000 mL (0 mL Intravenous Stopped 10/6/22 0049)   acetaminophen (TYLENOL) tablet 975 mg (975 mg Oral Given 10/5/22 2136)   ondansetron (ZOFRAN) injection 4 mg (4 mg Intravenous Given 10/5/22 2138)       Diagnostic Studies  Results Reviewed     Procedure Component Value Units Date/Time    D-dimer, quantitative [[id number]]  (Normal) Collected: 10/05/22 2229    Lab Status: Final result Specimen: Blood from Arm, Right Updated: 10/05/22 2258     D-Dimer, Quant <0 27 ug/ml FEU     HS Troponin 0hr (reflex protocol) [[id number]]  (Normal) Collected: 10/05/22 2229    Lab Status: Final result Specimen: Blood from Arm, Right Updated: 10/05/22 2255     hs TnI 0hr 2 ng/L     UA w Reflex to Microscopic w Reflex to Culture [[id number]] Collected: 10/05/22 2245    Lab Status: Final result Specimen: Urine, Clean Catch Updated: 10/05/22 2253     Color, UA Yellow     Clarity, UA Clear Specific [place], UA 1 010     pH, UA 7 0     Leukocytes, UA Negative     Nitrite, UA Negative     Protein, UA Negative mg/dl      Glucose, UA Negative mg/dl      Ketones, UA Negative mg/dl      Urobilinogen, UA 0 2 E U /dl      Bilirubin, UA Negative     Occult Blood, UA Negative     URINE COMMENT --    Urine culture [[id number]] Collected: 10/05/22 2245    Lab Status: In process Specimen: Urine, Clean Catch Updated: 10/05/22 2253    hCG, quantitative [[id number]]  (Abnormal) Collected: 10/05/22 2141    Lab Status: Final result Specimen: Blood from Arm, Right Updated: 10/05/22 2243     HCG, Quant 34,514 mIU/mL     Narrative:       Expected Ranges:     Approximate               Approximate HCG  Gestation age          Concentration ( mIU/mL)  _____________          ______________________   Dwight Neal                      HCG values  0 2-1                       5-50  1-2                           2-3                         100-5000  3-4                         500-20465  4-5                         1000-03707  5-6                         12511-077782  6-8                         [id number]  8-12                        [id number]      COVID/FLU/RSV [[id number]]  (Abnormal) Collected: 10/05/22 2141    Lab Status: Final result Specimen: Nares from Nose Updated: 10/05/22 2229     SARS-CoV-2 Positive     INFLUENZA A PCR Negative     INFLUENZA B PCR Negative     RSV PCR Negative    Narrative:      FOR PEDIATRIC PATIENTS - copy/paste COVID Guidelines URL to browser: [email or web address]    SARS-CoV-2 assay is a Nucleic Acid Amplification assay intended for the  qualitative detection of nucleic acid from SARS-CoV-2 in nasopharyngeal  swabs  Results are for the presumptive identification of SARS-CoV-2 RNA      Positive results are indicative of infection with SARS-CoV-2, the virus  causing COVID-19, but do not rule out bacterial infection or co-infection  with other viruses  Laboratories within the [place] and its  territories are required to report all positive results to the appropriate  public health authorities  Negative results do not preclude SARS-CoV-2  infection and should not be used as the sole basis for treatment or other  patient management decisions  Negative results must be combined with  clinical observations, patient history, and epidemiological information  This test has not been FDA cleared or approved  This test has been authorized by FDA under an Emergency Use Authorization  (EUA)  This test is only authorized for the duration of time the  declaration that circumstances exist justifying the authorization of the  emergency use of an in vitro diagnostic tests for detection of SARS-CoV-2  virus and/or diagnosis of COVID-19 infection under section 564(b)(1) of  the Act, 21 U  S C  805CBA-2(L)(1), unless the authorization is terminated  or revoked sooner  The test has been validated but independent review by FDA  and CLIA is pending  Test performed using [employer] Gene[employer]pert: This RT-PCR assay targets N2,  a region unique to SARS-CoV-2  A conserved region in the E-gene was chosen  for pan-Sarbecovirus detection which includes SARS-CoV-2  According to CMS-2020-01-R, this platform meets the definition of high-throughput technology      Comprehensive metabolic panel [[id number]]  (Abnormal) Collected: 10/05/22 2141    Lab Status: Final result Specimen: Blood from Arm, Right Updated: 10/05/22 2207     Sodium 134 mmol/L      Potassium 3 8 mmol/L      Chloride 98 mmol/L      CO2 25 mmol/L      ANION GAP 11 mmol/L      BUN 7 mg/dL      Creatinine 0 69 mg/dL      Glucose 167 mg/dL      Calcium 9 5 mg/dL      AST 13 U/L      ALT 20 U/L      Alkaline Phosphatase 78 U/L      Total Protein 7 3 g/dL      Albumin 3 6 g/dL      Total Bilirubin 0 38 mg/dL      eGFR 110 ml/min/1 73sq m     Narrative:      [place] guidelines for Chronic Kidney Disease (CKD):     Stage 1 with normal or high GFR (GFR > 90 mL/min/1 73 square meters)    Stage 2 Mild CKD (GFR = 60-89 mL/min/1 73 square meters)    Stage 3A Moderate CKD (GFR = 45-59 mL/min/1 73 square meters)    Stage 3B Moderate CKD (GFR = 30-44 mL/min/1 73 square meters)    Stage 4 Severe CKD (GFR = 15-29 mL/min/1 73 square meters)    Stage 5 End Stage CKD (GFR <15 mL/min/1 73 square meters)  Note: GFR calculation is accurate only with a steady state creatinine    CBC and differential [[id number]]  (Abnormal) Collected: 10/05/22 2141    Lab Status: Final result Specimen: Blood from Arm, Right Updated: 10/05/22 2151     WBC 7 60 Thousand/uL      RBC 4 26 Million/uL      Hemoglobin 12 2 g/dL      Hematocrit 36 7 %      MCV 86 fL      MCH 28 6 pg      MCHC 33 2 g/dL      RDW 11 9 %      MPV 11 4 fL      Platelets 645 Thousands/uL      nRBC 0 /100 WBCs      Neutrophils Relative 78 %      Immat GRANS % 1 %      Lymphocytes Relative 6 %      Monocytes Relative 13 %      Eosinophils Relative 1 %      Basophils Relative 1 %      Neutrophils Absolute 5 98 Thousands/µL      Immature Grans Absolute 0 05 Thousand/uL      Lymphocytes Absolute 0 49 Thousands/µL      Monocytes Absolute 0 99 Thousand/µL      Eosinophils Absolute 0 05 Thousand/µL      Basophils Absolute 0 04 Thousands/µL                  US OB < 14 weeks with transvaginal   Final Result by Brian Krishna DO (10/06 0021)      Single live intrauterine gestation at 6 weeks by days (range +/- 3 days)  GARIMA of 05/26/2023    Recommend repeat study at 18-20 weeks gestation to evaluate for fetal anatomy            Workstation performed: [id number]                    Procedures  ECG 12 Lead Documentation Only    Date/Time: 10/5/2022 9:29 AM  Performed by: Kay Ramos MD  Authorized by: Kay Ramos MD     Indications / Diagnosis:  Tachycardia  ECG reviewed by me, the ED Provider: yes    Patient location:  ED  Previous ECG: Previous ECG:  Unavailable  Interpretation:     Interpretation: abnormal    Rate:     ECG rate:  127    ECG rate assessment: tachycardic    Rhythm:     Rhythm: sinus rhythm    Ectopy:     Ectopy: none    QRS:     QRS axis:  Normal    QRS intervals:  Normal  Conduction:     Conduction: abnormal      Abnormal conduction: incomplete RBBB    ST segments:     ST segments:  Normal  T waves:     T waves: normal      POC Pelvic US    Date/Time: 10/5/2022 9:29 PM  Performed by: Michael Anne MD  Authorized by: Michael Anne MD     Patient location:  ED  Procedure details:     Exam Type:  Diagnostic    Indications: evaluate for IUP      Assessment for: determine estimated gestational age and confirm intrauterine pregnancy      Technique:  Transabdominal obstetric (HCG+) exam    Views obtained: uterus (transverse and sagittal)      Image quality: diagnostic      Image availability:  Images available in PACS  Uterine findings:     Single gestation: identified      Gestational sac: identified      Yolk sac: not identified      Fetal pole: not identified      Fetal heart rate: not identified    Interpretation:     Ultrasound impressions: indeterminate      Pregnancy findings: indeterminate               ED Course  ED Course as of 10/06/22 0334   Wed Oct 05, 2022   2229 SARS-COV-2(!): Positive   2259 D-Dimer, Quant: <0 27                               SBIRT 20yo+    Flowsheet Row Most Recent Value   SBIRT (25 yo +)    In order to provide better care to our patients, we are screening all of our patients for alcohol and drug use  Would it be okay to ask you these screening questions? Yes Filed at: 10/05/2022 2217   Initial Alcohol Screen: US AUDIT-C     1  How often do you have a drink containing alcohol? 0 Filed at: 10/05/2022 2217   2  How many drinks containing alcohol do you have on a typical day you are drinking? 0 Filed at: 10/05/2022 2217   3b  FEMALE Any Age, or MALE 65+:  How often do you have 4 or more drinks on one occassion? 0 Filed at: 10/05/2022 2217   Audit-C Score 0 Filed at: 10/05/2022 2217   CHACHA: How many times in the past year have you    Used an illegal drug or used a prescription medication for non-medical reasons? Never Filed at: 10/05/2022 2217                    MDM  Number of Diagnoses or Management Options  COVID-19  Fever  Pregnancy  Tachycardia  Diagnosis management comments: Patient's presentation is concerning for likely viral infection, likely COVID-19  On ultrasound exam, I do see evidence of a gestational sac, however I do not see anything in this at this time  It may be too early in her pregnancy, however given that she has had abdominal cramping for several weeks prior to her recent symptoms, will order transvaginal ultrasound for clarification  Labs ordered given significantly elevated heart rate, fluids, Zofran, Tylenol  Suspect heart rate is likely due to fever  Tachycardia only mildly resolved after Tylenol  Heart rate 120s  D-dimer that was negative  Ultrasound demonstrated intrauterine pregnancy with heart rate at 131  Patient was found to be COVID positive  She was a monoclonal antibody candidate  I did discuss with her that she is not vaccinated and pregnant so at higher risk of severe complications from [place], and recommended that she talk with her PCP for monoclonal antibody referral   She discussed that she did not have 1, I recommended the [place] [address] [place] [address] and provide her with that information  Discharged strict return precautions        Disposition  Final diagnoses:   COVID-19   Tachycardia   Fever   Pregnancy     Time reflects when diagnosis was documented in both MDM as applicable and the Disposition within this note     Time User Action Codes Description Comment    10/6/2022 12:45 AM Alexia Matt Add [U07 1] COVID-19     10/6/2022 12:45 AM Aida Bob Add [R00 0] Tachycardia     10/6/2022 12:45 AM Alexia Matt Add [R50 9] Fever     10/6/2022 12:46 AM Jeaneth Baker Add [Z34 90] Pregnancy       ED Disposition     ED Disposition   Discharge    Condition   Stable    Date/Time   Thu Oct 6, 2022 12:45 AM    Comment   [place] discharge to home/self care                 Follow-up Information     Follow up With Specialties Details Why Contact Info Additional Information    [address] Family Medicine Call   [facility] [facility] [address]  [address] [phone]          Discharge Medication List as of 10/6/2022 12:58 AM      START taking these medications    Details   acetaminophen (TYLENOL) 500 mg tablet Take 1 tablet (500 mg total) by mouth every 6 (six) hours as needed for mild pain, Starting Thu 10/6/2022, Normal         CONTINUE these medications which have NOT CHANGED    Details   albuterol (PROVENTIL HFA,VENTOLIN HFA) 90 mcg/act inhaler Inhale 2 puffs every 4 (four) hours as needed for wheezing (or cough), Starting Mon 10/7/2019, Normal      butalbital-acetaminophen-caffeine (FIORICET,ESGIC) -40 mg per tablet Take 1 tablet by mouth every 4 (four) hours as needed for headaches, Historical Med      diazepam (VALIUM) 10 mg tablet TAKE 1-2 TABLETS BY MOUTH 1-2 HOURS BEFORE PROCEDURE, Historical Med      ergocalciferol (VITAMIN D2) 50,000 units Take 50,000 Units by mouth once a week Every Wednesday, Starting Sun 10/7/2018, Historical Med      fluticasone (FLONASE) 50 mcg/act nasal spray 1 spray into each nostril daily for 7 days, Starting Fri 12/28/2018, Until Sun 5/24/2020, Normal      gabapentin (NEURONTIN) 300 mg capsule TAKE 1 CAPSULE BY MOUTH 3 TO 4 TIMES A DAY, Historical Med      levonorgestrel (MIRENA) 20 MCG/24HR IUD 1 each by Intrauterine route once, Historical Med      multivitamin (THERAGRAN) TABS Take 1 tablet by mouth daily, Historical Med      oxyCODONE-acetaminophen (PERCOCET)  mg per tablet take 1 tablet by mouth every 6 hours if needed severe pain, Historical Med      SUMAtriptan (IMITREX) 100 mg tablet as needed, Starting Wed 1/15/2020, Historical Med      topiramate (TOPAMAX) 25 mg tablet Take 25 mg by mouth 2 (two) times a day, Starting Wed 1/15/2020, Historical Med             No discharge procedures on file      PDMP Review     None          ED Provider  Electronically Signed by           Michael Anne MD  10/06/22 [address] [facility] Madelyn Hernandez MD  10/06/22 1970

## 2022-10-06 NOTE — DISCHARGE INSTRUCTIONS
Please call your primary care doctor tomorrow morning as they may recommend monoclonal antibody therapy since you are not vaccinated and pregnant  This is time sensitive  Please taken tylenol for fevers (do not take motrin given pregnancy)    Follow up with your OB doctor and your primary care doctor

## 2022-10-07 LAB — BACTERIA UR CULT: NORMAL

## 2022-10-09 LAB
ATRIAL RATE: 127 BPM
P AXIS: 38 DEGREES
PR INTERVAL: 146 MS
QRS AXIS: 30 DEGREES
QRSD INTERVAL: 106 MS
QT INTERVAL: 314 MS
QTC INTERVAL: 456 MS
T WAVE AXIS: 50 DEGREES
VENTRICULAR RATE: 127 BPM

## 2022-10-10 PROCEDURE — 93010 ELECTROCARDIOGRAM REPORT: CPT | Performed by: INTERNAL MEDICINE

## 2022-10-26 ENCOUNTER — APPOINTMENT (OUTPATIENT)
Dept: LAB | Facility: HOSPITAL | Age: 37
End: 2022-10-26

## 2022-10-26 DIAGNOSIS — E66.01 CLASS 2 SEVERE OBESITY DUE TO EXCESS CALORIES WITH SERIOUS COMORBIDITY IN ADULT, UNSPECIFIED BMI (HCC): ICD-10-CM

## 2022-10-26 DIAGNOSIS — Z34.91 FIRST TRIMESTER PREGNANCY: ICD-10-CM

## 2022-10-31 ENCOUNTER — APPOINTMENT (OUTPATIENT)
Dept: LAB | Facility: HOSPITAL | Age: 37
End: 2022-10-31

## 2022-10-31 LAB
ABO GROUP BLD: NORMAL
BACTERIA UR QL AUTO: ABNORMAL /HPF
BASOPHILS # BLD AUTO: 0.06 THOUSANDS/ÂΜL (ref 0–0.1)
BASOPHILS NFR BLD AUTO: 0 % (ref 0–1)
BILIRUB UR QL STRIP: NEGATIVE
BLD GP AB SCN SERPL QL: NEGATIVE
CLARITY UR: ABNORMAL
COLOR UR: YELLOW
EOSINOPHIL # BLD AUTO: 0.07 THOUSAND/ÂΜL (ref 0–0.61)
EOSINOPHIL NFR BLD AUTO: 1 % (ref 0–6)
ERYTHROCYTE [DISTWIDTH] IN BLOOD BY AUTOMATED COUNT: 12.6 % (ref 11.6–15.1)
GLUCOSE 1H P 50 G GLC PO SERPL-MCNC: 278 MG/DL (ref 40–134)
GLUCOSE SERPL-MCNC: 279 MG/DL (ref 65–140)
GLUCOSE UR STRIP-MCNC: ABNORMAL MG/DL
HBV SURFACE AB SER-ACNC: <3.1 MIU/ML
HBV SURFACE AG SER QL: NORMAL
HCT VFR BLD AUTO: 36.4 % (ref 34.8–46.1)
HCV AB SER QL: NORMAL
HGB BLD-MCNC: 12 G/DL (ref 11.5–15.4)
HGB UR QL STRIP.AUTO: NEGATIVE
IMM GRANULOCYTES # BLD AUTO: 0.13 THOUSAND/UL (ref 0–0.2)
IMM GRANULOCYTES NFR BLD AUTO: 1 % (ref 0–2)
KETONES UR STRIP-MCNC: NEGATIVE MG/DL
LEUKOCYTE ESTERASE UR QL STRIP: ABNORMAL
LYMPHOCYTES # BLD AUTO: 2.8 THOUSANDS/ÂΜL (ref 0.6–4.47)
LYMPHOCYTES NFR BLD AUTO: 19 % (ref 14–44)
MCH RBC QN AUTO: 28.6 PG (ref 26.8–34.3)
MCHC RBC AUTO-ENTMCNC: 33 G/DL (ref 31.4–37.4)
MCV RBC AUTO: 87 FL (ref 82–98)
MONOCYTES # BLD AUTO: 0.72 THOUSAND/ÂΜL (ref 0.17–1.22)
MONOCYTES NFR BLD AUTO: 5 % (ref 4–12)
NEUTROPHILS # BLD AUTO: 10.7 THOUSANDS/ÂΜL (ref 1.85–7.62)
NEUTS SEG NFR BLD AUTO: 74 % (ref 43–75)
NITRITE UR QL STRIP: NEGATIVE
NON-SQ EPI CELLS URNS QL MICRO: ABNORMAL /HPF
NRBC BLD AUTO-RTO: 0 /100 WBCS
PH UR STRIP.AUTO: 6.5 [PH]
PLATELET # BLD AUTO: 270 THOUSANDS/UL (ref 149–390)
PMV BLD AUTO: 10.9 FL (ref 8.9–12.7)
PROT UR STRIP-MCNC: NEGATIVE MG/DL
RBC # BLD AUTO: 4.2 MILLION/UL (ref 3.81–5.12)
RBC #/AREA URNS AUTO: ABNORMAL /HPF
RH BLD: POSITIVE
RUBV IGG SERPL IA-ACNC: 105.6 IU/ML
SP GR UR STRIP.AUTO: 1.01 (ref 1–1.03)
SPECIMEN EXPIRATION DATE: NORMAL
UROBILINOGEN UR QL STRIP.AUTO: 0.2 E.U./DL
WBC # BLD AUTO: 14.48 THOUSAND/UL (ref 4.31–10.16)
WBC #/AREA URNS AUTO: ABNORMAL /HPF

## 2022-11-01 LAB
EST. AVERAGE GLUCOSE BLD GHB EST-MCNC: 154 MG/DL
HBA1C MFR BLD: 7 %
RPR SER QL: NORMAL

## 2022-11-02 LAB
BACTERIA UR CULT: NORMAL
HIV 1+2 AB+HIV1 P24 AG SERPL QL IA: NORMAL

## 2022-12-27 ENCOUNTER — OFFICE VISIT (OUTPATIENT)
Dept: URGENT CARE | Facility: MEDICAL CENTER | Age: 37
End: 2022-12-27

## 2022-12-27 VITALS
OXYGEN SATURATION: 97 % | TEMPERATURE: 98.2 F | BODY MASS INDEX: 36.83 KG/M2 | SYSTOLIC BLOOD PRESSURE: 138 MMHG | WEIGHT: 242.2 LBS | HEART RATE: 107 BPM | DIASTOLIC BLOOD PRESSURE: 83 MMHG | RESPIRATION RATE: 24 BRPM

## 2022-12-27 DIAGNOSIS — J02.9 SORE THROAT: Primary | ICD-10-CM

## 2022-12-27 LAB — S PYO AG THROAT QL: NEGATIVE

## 2022-12-27 NOTE — PROGRESS NOTES
[address] [facility]        NAME: Luis Antonio Chance is a 42 y o  female  : 1985    MRN: [id number]  DATE: 2022  TIME: 4:29 PM    Assessment and Plan   Sore throat [J02 9]  1  Sore throat  POCT rapid strepA    Throat culture            Patient Instructions       Follow up with PCP in 3-5 days  Proceed to  ER if symptoms worsen  Chief Complaint     Chief Complaint   Patient presents with   • Sore Throat     X 3 days  5 months pregnant  History of Present Illness       Patient presents with a 3 day history of sore throat  Denies fever or chills she does have an occasional intermittent cough no nausea vomiting diarrhea body aches headaches or rashes  Currently 5 months pregnant  Review of Systems   Review of Systems   Constitutional: Negative for chills and fever  HENT: Positive for sore throat  Negative for congestion and rhinorrhea  Respiratory: Positive for cough  Gastrointestinal: Negative for diarrhea, nausea and vomiting  Musculoskeletal: Negative for joint swelling  Skin: Negative for rash  Neurological: Negative for headaches           Current Medications       Current Outpatient Medications:   •  acetaminophen (TYLENOL) 500 mg tablet, Take 1 tablet (500 mg total) by mouth every 6 (six) hours as needed for mild pain, Disp: 30 tablet, Rfl: 0  •  butalbital-acetaminophen-caffeine (FIORICET,ESGIC) -40 mg per tablet, Take 1 tablet by mouth every 4 (four) hours as needed for headaches, Disp: , Rfl:   •  diazepam (VALIUM) 10 mg tablet, TAKE 1-2 TABLETS BY MOUTH 1-2 HOURS BEFORE PROCEDURE, Disp: , Rfl: 0  •  ergocalciferol (VITAMIN D2) 50,000 units, Take 50,000 Units by mouth once a week Every Wednesday, Disp: , Rfl: 0  •  gabapentin (NEURONTIN) 300 mg capsule, TAKE 1 CAPSULE BY MOUTH 3 TO 4 TIMES A DAY, Disp: , Rfl: 0  •  levonorgestrel (MIRENA) 20 MCG/24HR IUD, 1 each by Intrauterine route once, Disp: , Rfl:   •  multivitamin (THERAGRAN) TABS, Take 1 tablet by mouth daily, Disp: , Rfl:   •  oxyCODONE-acetaminophen (PERCOCET)  mg per tablet, take 1 tablet by mouth every 6 hours if needed severe pain, Disp: , Rfl: 0  •  topiramate (TOPAMAX) 25 mg tablet, Take 25 mg by mouth 2 (two) times a day, Disp: , Rfl:   •  albuterol (PROVENTIL HFA,VENTOLIN HFA) 90 mcg/act inhaler, Inhale 2 puffs every 4 (four) hours as needed for wheezing (or cough) (Patient not taking: Reported on 2020), Disp: 1 Inhaler, Rfl: 1  •  fluticasone (FLONASE) 50 mcg/act nasal spray, 1 spray into each nostril daily for 7 days, Disp: 1 Bottle, Rfl: 0  •  SUMAtriptan (IMITREX) 100 mg tablet, as needed (Patient not taking: Reported on 2022), Disp: , Rfl:     Current Allergies     Allergies as of 2022   • (No Known Allergies)            The following portions of the patient's history were reviewed and updated as appropriate: allergies, current medications, past family history, past medical history, past social history, past surgical history and problem list      Past Medical History:   Diagnosis Date   • Anxiety    • Arachnoid cyst    • Chronic pain     back   • Depression    • Hypertension    • Migraine    • Mitral valve prolapse    • Psychiatric disorder     Anxiety and Depression       Past Surgical History:   Procedure Laterality Date   •  SECTION     • CHOLECYSTECTOMY     • CRANIOTOMY Right 3/1/2017    Procedure: RIGHT IMAGE GUIDED RETROMASTOID CRANIOTOMY FOR FENESTRATION AND PLACEMENT OF INTERNAL CYSTO-SUBARACHNOID SHUNT;  Surgeon: Mychal Butler MD;  Location: [facility];  Service:    • MOUTH SURGERY     • WISDOM TOOTH EXTRACTION         Family History   Problem Relation Age of Onset   • Rheum arthritis Mother    • Hypertension Mother    • Breast cancer Mother    • Polycystic ovary syndrome Mother    • No Known Problems Brother    • Asthma Son    • Diabetes Maternal Grandmother    • Thyroid disease Maternal Grandmother    • Stroke Maternal Grandmother    • Hypertension Maternal Grandmother    • Rheum arthritis Maternal Grandfather    • Lung cancer Maternal Grandfather    • Hypertension Maternal Grandfather    • COPD Maternal Grandfather    • Asthma Son    • Asthma Son    • Ear Disease Son          Medications have been verified  Objective   /83   Pulse (!) 107   Temp 98 2 °F (36 8 °C)   Resp (!) 24   Wt 110 kg (242 lb 3 2 oz)   SpO2 97%   BMI 36 83 kg/m²   No LMP recorded  Patient is pregnant  Physical Exam     Physical Exam  Vitals and nursing note reviewed  Constitutional:       Appearance: She is well-developed  HENT:      Head: Normocephalic and atraumatic  Right Ear: Tympanic membrane normal       Left Ear: Tympanic membrane normal       Mouth/Throat:      Mouth: Mucous membranes are moist       Pharynx: Posterior oropharyngeal erythema present  No oropharyngeal exudate  Tonsils: No tonsillar exudate  Eyes:      Conjunctiva/sclera: Conjunctivae normal    Cardiovascular:      Rate and Rhythm: Normal rate and regular rhythm  Heart sounds: Normal heart sounds  Pulmonary:      Effort: Pulmonary effort is normal       Breath sounds: Normal breath sounds  Musculoskeletal:      Cervical back: Neck supple  Lymphadenopathy:      Cervical: No cervical adenopathy  Skin:     General: Skin is warm  Neurological:      Mental Status: She is alert

## 2022-12-27 NOTE — LETTER
December 27, 2022     Patient: Bindu Stacy   YOB: 1985   Date of Visit: 12/27/2022       To Whom It May Concern: It is my medical opinion that Bindu Stacy be excused from work 12/27/22  If you have any questions or concerns, please don't hesitate to call           Sincerely,        Vashti Queen PA-C    CC: No Recipients

## 2022-12-29 LAB — BACTERIA THROAT CULT: NORMAL

## 2022-12-30 ENCOUNTER — HOSPITAL ENCOUNTER (EMERGENCY)
Facility: HOSPITAL | Age: 37
Discharge: HOME | End: 2022-12-30
Attending: EMERGENCY MEDICINE

## 2022-12-30 VITALS
TEMPERATURE: 98.1 F | DIASTOLIC BLOOD PRESSURE: 79 MMHG | HEART RATE: 113 BPM | RESPIRATION RATE: 17 BRPM | OXYGEN SATURATION: 98 % | SYSTOLIC BLOOD PRESSURE: 140 MMHG

## 2022-12-30 DIAGNOSIS — O26.899 ABDOMINAL PAIN IN PREGNANCY: Primary | ICD-10-CM

## 2022-12-30 DIAGNOSIS — R10.9 ABDOMINAL PAIN IN PREGNANCY: Primary | ICD-10-CM

## 2022-12-30 LAB
ALBUMIN SERPL BCP-MCNC: 2.9 G/DL (ref 3.5–5)
ALP SERPL-CCNC: 84 U/L (ref 46–116)
ALT SERPL W P-5'-P-CCNC: 15 U/L (ref 12–78)
ANION GAP SERPL CALCULATED.3IONS-SCNC: 10 MMOL/L (ref 4–13)
AST SERPL W P-5'-P-CCNC: 16 U/L (ref 5–45)
BASOPHILS # BLD AUTO: 0.04 THOUSANDS/ÂΜL (ref 0–0.1)
BASOPHILS NFR BLD AUTO: 0 % (ref 0–1)
BILIRUB SERPL-MCNC: 0.38 MG/DL (ref 0.2–1)
BILIRUB UR QL STRIP: NEGATIVE
BUN SERPL-MCNC: 5 MG/DL (ref 5–25)
CALCIUM ALBUM COR SERPL-MCNC: 9 MG/DL (ref 8.3–10.1)
CALCIUM SERPL-MCNC: 8.1 MG/DL (ref 8.3–10.1)
CHLORIDE SERPL-SCNC: 96 MMOL/L (ref 96–108)
CLARITY UR: CLEAR
CO2 SERPL-SCNC: 21 MMOL/L (ref 21–32)
COLOR UR: YELLOW
CREAT SERPL-MCNC: 0.5 MG/DL (ref 0.6–1.3)
EOSINOPHIL # BLD AUTO: 0.08 THOUSAND/ÂΜL (ref 0–0.61)
EOSINOPHIL NFR BLD AUTO: 1 % (ref 0–6)
ERYTHROCYTE [DISTWIDTH] IN BLOOD BY AUTOMATED COUNT: 13.1 % (ref 11.6–15.1)
FLUAV RNA RESP QL NAA+PROBE: NEGATIVE
FLUBV RNA RESP QL NAA+PROBE: NEGATIVE
GFR SERPL CREATININE-BSD FRML MDRD: 122 ML/MIN/1.73SQ M
GLUCOSE SERPL-MCNC: 182 MG/DL (ref 65–140)
GLUCOSE UR STRIP-MCNC: ABNORMAL MG/DL
HCT VFR BLD AUTO: 36.5 % (ref 34.8–46.1)
HGB BLD-MCNC: 12.2 G/DL (ref 11.5–15.4)
HGB UR QL STRIP.AUTO: NEGATIVE
IMM GRANULOCYTES # BLD AUTO: 0.1 THOUSAND/UL (ref 0–0.2)
IMM GRANULOCYTES NFR BLD AUTO: 1 % (ref 0–2)
KETONES UR STRIP-MCNC: NEGATIVE MG/DL
LEUKOCYTE ESTERASE UR QL STRIP: NEGATIVE
LIPASE SERPL-CCNC: 64 U/L (ref 73–393)
LYMPHOCYTES # BLD AUTO: 1.7 THOUSANDS/ÂΜL (ref 0.6–4.47)
LYMPHOCYTES NFR BLD AUTO: 18 % (ref 14–44)
MCH RBC QN AUTO: 28.9 PG (ref 26.8–34.3)
MCHC RBC AUTO-ENTMCNC: 33.4 G/DL (ref 31.4–37.4)
MCV RBC AUTO: 87 FL (ref 82–98)
MONOCYTES # BLD AUTO: 0.93 THOUSAND/ÂΜL (ref 0.17–1.22)
MONOCYTES NFR BLD AUTO: 10 % (ref 4–12)
NEUTROPHILS # BLD AUTO: 6.54 THOUSANDS/ÂΜL (ref 1.85–7.62)
NEUTS SEG NFR BLD AUTO: 70 % (ref 43–75)
NITRITE UR QL STRIP: NEGATIVE
NRBC BLD AUTO-RTO: 0 /100 WBCS
PH UR STRIP.AUTO: 6 [PH]
PLATELET # BLD AUTO: 211 THOUSANDS/UL (ref 149–390)
PMV BLD AUTO: 10.8 FL (ref 8.9–12.7)
POTASSIUM SERPL-SCNC: 3.5 MMOL/L (ref 3.5–5.3)
PROT SERPL-MCNC: 6.8 G/DL (ref 6.4–8.4)
PROT UR STRIP-MCNC: NEGATIVE MG/DL
RBC # BLD AUTO: 4.22 MILLION/UL (ref 3.81–5.12)
RSV RNA RESP QL NAA+PROBE: NEGATIVE
SARS-COV-2 RNA RESP QL NAA+PROBE: NEGATIVE
SODIUM SERPL-SCNC: 127 MMOL/L (ref 135–147)
SP GR UR STRIP.AUTO: 1.01 (ref 1–1.03)
UROBILINOGEN UR QL STRIP.AUTO: 0.2 E.U./DL
WBC # BLD AUTO: 9.39 THOUSAND/UL (ref 4.31–10.16)

## 2022-12-30 RX ORDER — ONDANSETRON 2 MG/ML
4 INJECTION INTRAMUSCULAR; INTRAVENOUS ONCE
Status: COMPLETED | OUTPATIENT
Start: 2022-12-30 | End: 2022-12-30

## 2022-12-30 RX ORDER — ONDANSETRON 4 MG/1
4 TABLET, FILM COATED ORAL EVERY 8 HOURS PRN
Qty: 12 TABLET | Refills: 0 | Status: SHIPPED | OUTPATIENT
Start: 2022-12-30

## 2022-12-30 RX ORDER — LIDOCAINE 50 MG/G
1 PATCH TOPICAL ONCE
Status: DISCONTINUED | OUTPATIENT
Start: 2022-12-30 | End: 2022-12-30 | Stop reason: HOSPADM

## 2022-12-30 RX ADMIN — SODIUM CHLORIDE 1000 ML: 0.9 INJECTION, SOLUTION INTRAVENOUS at 14:54

## 2022-12-30 RX ADMIN — ONDANSETRON 4 MG: 2 INJECTION INTRAMUSCULAR; INTRAVENOUS at 14:58

## 2022-12-30 RX ADMIN — LIDOCAINE 1 PATCH: 140 PATCH CUTANEOUS at 16:12

## 2022-12-30 NOTE — ED PROVIDER NOTES
Final Diagnosis:  1  Abdominal pain in pregnancy        Chief Complaint   Patient presents with   • Pregnancy Problem     Patient states she is about 5 months pregnant and doesn't feel the baby move anymore  Patient unsure of how many weeks along she is  Patient complains of lower back pain, nausea and diarrhea  Patient took an at home covid test which was negative and then was seen at urgent care and swabbed for strep  Patient's back pain is 10/10 and took tylenol around 1330 for it  HPI  Patient presents for evaluation of generalized back pain and decreased fetal movement  Patient states that she is currently pregnant but unsure exactly how far along she is  She follows with outside hospital   She states that she is concerned that she may be having a miscarriage as she feels that she has been having decreased fetal movement  She is also having some nausea and generalized back pain  Denies any falls  No dysuria hematuria  No vaginal discharge  No cough or congestion  No fever chills  Review of records show that the patient was seen at the beginning of this month by her OB/GYN and it was 15 weeks at that time, currently 18 weeks  Patient has history of multiple pregnancies as well as 3 or 4 miscarriages during the first trimester  Unless otherwise specified:  - No language barrier    - History obtained from patient  - There are no limitations to the history obtained  - Previous charting was reviewed    PMH:   has a past medical history of Anxiety, Arachnoid cyst, Chronic pain, Depression, Hypertension, Migraine, Mitral valve prolapse, and Psychiatric disorder  PSH:   has a past surgical history that includes Mouth surgery; Cholecystectomy;  section; Wheeler tooth extraction; and Craniotomy (Right, 3/1/2017)  ROS:  Review of Systems   -   - 13 point ROS was performed and all are normal unless stated in the history above  - Nursing note reviewed  Vitals reviewed     - Orders placed by myself and/or advanced practitioner / resident  PE:   Vitals:    12/30/22 1442   BP: 140/79   Pulse: (!) 113   Resp: 17   Temp: 98 1 °F (36 7 °C)   TempSrc: Temporal   SpO2: 98%     Vitals reviewed by me  Abdomen is soft, nontender to palpation  No chava flank tenderness or midline step-offs or deformities  Good fetal heart rate on ultrasound  Intermittent fetal movement  Unless otherwise specified above:    General: VS reviewed  Appears in NAD    Head: Normocephalic, atraumatic  Eyes: EOM-I  No exudate  ENT: Atraumatic external nose and ears  No malocclusion  No stridor  No drooling  Neck: No JVD  CV: No pallor noted  Lungs:   No tachypnea  No respiratory distress    Abdomen:  Soft, non-tender, non-distended    MSK:   No obvious deformity    Skin: Dry, intact  No obvious rash  Neuro: Awake, alert, GCS15, CN II-XII grossly intact  Speaking in full sentences  Motor grossly intact  Psychiatric/Behavioral: Appropriate mood and affect   Exam: deferred    Physical Exam     Procedures   A:  - Nursing note reviewed  ED Course as of 12/30/22 1611   Fri Dec 30, 2022   1520 Bedside ultrasound with good fetal heart rate at 158, intermittent fetal movement       No orders to display     Orders Placed This Encounter   Procedures   • Urine culture   • FLU/RSV/COVID - if FLU/RSV clinically relevant   • CBC and differential   • Comprehensive metabolic panel   • Lipase   • UA w Reflex to Microscopic w Reflex to Culture   • Insert peripheral IV     Labs Reviewed   COMPREHENSIVE METABOLIC PANEL - Abnormal       Result Value Ref Range Status    Sodium 127 (*) 135 - 147 mmol/L Final    Potassium 3 5  3 5 - 5 3 mmol/L Final    Chloride 96  96 - 108 mmol/L Final    CO2 21  21 - 32 mmol/L Final    ANION GAP 10  4 - 13 mmol/L Final    BUN 5  5 - 25 mg/dL Final    Creatinine 0 50 (*) 0 60 - 1 30 mg/dL Final    Comment: Standardized to IDMS reference method    Glucose 182 (*) 65 - 140 mg/dL Final    Comment: If the patient is fasting, the ADA then defines impaired fasting glucose as > 100 mg/dL and diabetes as > or equal to 123 mg/dL  Specimen collection should occur prior to Sulfasalazine administration due to the potential for falsely depressed results  Specimen collection should occur prior to Sulfapyridine administration due to the potential for falsely elevated results  Calcium 8 1 (*) 8 3 - 10 1 mg/dL Final    Corrected Calcium 9 0  8 3 - 10 1 mg/dL Final    AST 16  5 - 45 U/L Final    Comment: Specimen collection should occur prior to Sulfasalazine administration due to the potential for falsely depressed results  ALT 15  12 - 78 U/L Final    Comment: Specimen collection should occur prior to Sulfasalazine administration due to the potential for falsely depressed results  Alkaline Phosphatase 84  46 - 116 U/L Final    Total Protein 6 8  6 4 - 8 4 g/dL Final    Albumin 2 9 (*) 3 5 - 5 0 g/dL Final    Total Bilirubin 0 38  0 20 - 1 00 mg/dL Final    Comment: Use of this assay is not recommended for patients undergoing treatment with eltrombopag due to the potential for falsely elevated results      eGFR 122  ml/min/1 73sq m Final    Narrative:     [place] guidelines for Chronic Kidney Disease (CKD):   •  Stage 1 with normal or high GFR (GFR > 90 mL/min/1 73 square meters)  •  Stage 2 Mild CKD (GFR = 60-89 mL/min/1 73 square meters)  •  Stage 3A Moderate CKD (GFR = 45-59 mL/min/1 73 square meters)  •  Stage 3B Moderate CKD (GFR = 30-44 mL/min/1 73 square meters)  •  Stage 4 Severe CKD (GFR = 15-29 mL/min/1 73 square meters)  •  Stage 5 End Stage CKD (GFR <15 mL/min/1 73 square meters)  Note: GFR calculation is accurate only with a steady state creatinine   LIPASE - Abnormal    Lipase 64 (*) 73 - 393 u/L Final   UA W REFLEX TO MICROSCOPIC WITH REFLEX TO CULTURE - Abnormal    Color, UA Yellow   Final    Clarity, UA Clear   Final    Specific Gravity, UA 1 010  1 003 - 1 030 Final    pH, UA 6 0  4 5, 5 0, 5 5, 6 0, 6 5, 7 0, 7 5, 8 0 Final    Leukocytes, UA Negative  Negative Final    Nitrite, UA Negative  Negative Final    Protein, UA Negative  Negative mg/dl Final    Glucose,  (1/10%) (*) Negative mg/dl Final    Ketones, UA Negative  Negative mg/dl Final    Urobilinogen, UA 0 2  0 2, 1 0 E U /dl E U /dl Final    Bilirubin, UA Negative  Negative Final    Occult Blood, UA Negative  Negative Final    URINE COMMENT     Final    Comment: Pt is pregnant  Urine Culture ordered  URINE CULTURE   COVID19, INFLUENZA A/B, RSV PCR, [facility]   CBC AND DIFFERENTIAL    WBC 9 39  4 31 - 10 16 Thousand/uL Final    RBC 4 22  3 81 - 5 12 Million/uL Final    Hemoglobin 12 2  11 5 - 15 4 g/dL Final    Hematocrit 36 5  34 8 - 46 1 % Final    MCV 87  82 - 98 fL Final    MCH 28 9  26 8 - 34 3 pg Final    MCHC 33 4  31 4 - 37 4 g/dL Final    RDW 13 1  11 6 - 15 1 % Final    MPV 10 8  8 9 - 12 7 fL Final    Platelets 154  485 - 390 Thousands/uL Final    nRBC 0  /100 WBCs Final    Neutrophils Relative 70  43 - 75 % Final    Immat GRANS % 1  0 - 2 % Final    Lymphocytes Relative 18  14 - 44 % Final    Monocytes Relative 10  4 - 12 % Final    Eosinophils Relative 1  0 - 6 % Final    Basophils Relative 0  0 - 1 % Final    Neutrophils Absolute 6 54  1 85 - 7 62 Thousands/µL Final    Immature Grans Absolute 0 10  0 00 - 0 20 Thousand/uL Final    Lymphocytes Absolute 1 70  0 60 - 4 47 Thousands/µL Final    Monocytes Absolute 0 93  0 17 - 1 22 Thousand/µL Final    Eosinophils Absolute 0 08  0 00 - 0 61 Thousand/µL Final    Basophils Absolute 0 04  0 00 - 0 10 Thousands/µL Final         Final Diagnosis:  1  Abdominal pain in pregnancy        P:  -Laboratory analysis was unremarkable  Will screen for flu and COVID  Follow-up with OB/GYN   - On reevaluation the patient was sleeping soundly in room  Vital signs improved with fluids  Type patient is mildly hyponatremic    Discussed follow-up with primary care for rechecking  Patient history also obtained from   Chart review revealed     Medications   lidocaine (LIDODERM) 5 % patch 1 patch (has no administration in time range)   sodium chloride 0 9 % bolus 1,000 mL (0 mL Intravenous Stopped 12/30/22 1554)   ondansetron (ZOFRAN) injection 4 mg (4 mg Intravenous Given 12/30/22 1458)     Time reflects when diagnosis was documented in both MDM as applicable and the Disposition within this note     Time User Action Codes Description Comment    12/30/2022  4:06 PM Urbano Bright Add [O26 899,  R10 9] Abdominal pain in pregnancy       ED Disposition     ED Disposition   Discharge    Condition   Stable    Date/Time   Fri Dec 30, 2022  4:06 PM    Comment   Ziyad Maddox discharge to home/self care  Follow-up Information     Follow up With Specialties Details Why Contact Info    Jameson Vasquez DO Family Medicine   [address] [place]  [phone]      Your OBGYN  Schedule an appointment as soon as possible for a visit           Patient's Medications   Discharge Prescriptions    ONDANSETRON (ZOFRAN) 4 MG TABLET    Take 1 tablet (4 mg total) by mouth every 8 (eight) hours as needed for nausea or vomiting       Start Date: 12/30/2022End Date: --       Order Dose: 4 mg       Quantity: 12 tablet    Refills: 0     No discharge procedures on file  Prior to Admission Medications   Prescriptions Last Dose Informant Patient Reported? Taking?    SUMAtriptan (IMITREX) 100 mg tablet   Yes No   Sig: as needed   Patient not taking: Reported on 12/27/2022   acetaminophen (TYLENOL) 500 mg tablet   No No   Sig: Take 1 tablet (500 mg total) by mouth every 6 (six) hours as needed for mild pain   albuterol (PROVENTIL HFA,VENTOLIN HFA) 90 mcg/act inhaler   No No   Sig: Inhale 2 puffs every 4 (four) hours as needed for wheezing (or cough)   Patient not taking: Reported on 7/2/2020   butalbital-acetaminophen-caffeine (FIORICET,ESGIC) -40 mg per tablet   Yes No   Sig: Take 1 tablet by mouth every 4 (four) hours as needed for headaches   diazepam (VALIUM) 10 mg tablet   Yes No   Sig: TAKE 1-2 TABLETS BY MOUTH 1-2 HOURS BEFORE PROCEDURE   ergocalciferol (VITAMIN D2) 50,000 units   Yes No   Sig: Take 50,000 Units by mouth once a week Every Wednesday   fluticasone (FLONASE) 50 mcg/act nasal spray   No No   Si spray into each nostril daily for 7 days   gabapentin (NEURONTIN) 300 mg capsule   Yes No   Sig: TAKE 1 CAPSULE BY MOUTH 3 TO 4 TIMES A DAY   levonorgestrel (MIRENA) 20 MCG/24HR IUD   Yes No   Si each by Intrauterine route once   multivitamin (THERAGRAN) TABS   Yes No   Sig: Take 1 tablet by mouth daily   oxyCODONE-acetaminophen (PERCOCET)  mg per tablet   Yes No   Sig: take 1 tablet by mouth every 6 hours if needed severe pain   topiramate (TOPAMAX) 25 mg tablet   Yes No   Sig: Take 25 mg by mouth 2 (two) times a day      Facility-Administered Medications: None       Portions of the record may have been created with voice recognition software  Occasional wrong word or "sound a like" substitutions may have occurred due to the inherent limitations of voice recognition software  Read the chart carefully and recognize, using context, where substitutions have occurred      Electronically signed by:  MD Flex Ortiz MD  22 Viviane Garrett MD  22 [id number]

## 2022-12-31 LAB — BACTERIA UR CULT: NORMAL

## 2023-01-11 ENCOUNTER — HOSPITAL ENCOUNTER (EMERGENCY)
Facility: HOSPITAL | Age: 38
Discharge: HOME | End: 2023-01-11
Attending: EMERGENCY MEDICINE

## 2023-01-11 VITALS
OXYGEN SATURATION: 98 % | RESPIRATION RATE: 18 BRPM | HEART RATE: 110 BPM | SYSTOLIC BLOOD PRESSURE: 135 MMHG | TEMPERATURE: 98.2 F | DIASTOLIC BLOOD PRESSURE: 80 MMHG

## 2023-01-11 DIAGNOSIS — J02.9 PHARYNGITIS: Primary | ICD-10-CM

## 2023-01-11 DIAGNOSIS — Z72.0 TOBACCO ABUSE: ICD-10-CM

## 2023-01-11 DIAGNOSIS — Z3A.20 20 WEEKS GESTATION OF PREGNANCY: ICD-10-CM

## 2023-01-11 RX ORDER — AMOXICILLIN 500 MG/1
500 CAPSULE ORAL EVERY 8 HOURS SCHEDULED
Qty: 21 CAPSULE | Refills: 0 | Status: SHIPPED | OUTPATIENT
Start: 2023-01-11 | End: 2023-01-18

## 2023-01-11 RX ADMIN — DEXAMETHASONE SODIUM PHOSPHATE 10 MG: 10 INJECTION, SOLUTION INTRAMUSCULAR; INTRAVENOUS at 21:49

## 2023-01-13 NOTE — ED PROVIDER NOTES
History  Chief Complaint   Patient presents with   • Sore Throat     Patient here recently for sore throat and states she still has one     Patient is a pleasant 26-year-old female complaining of sore throat for 3 weeks  Patient states she is currently 20 weeks pregnant  Denies fevers or chills  Says throat hurts to drink liquids or swallow any solids  No shortness of breath or fevers or chills  No vaginal discharge bleeding cramping or spotting  No meningismus  No rash  Sore Throat  Associated symptoms: no abdominal pain, no chest pain, no chills, no cough, no ear pain, no fever, no headaches, no rash, no rhinorrhea and no shortness of breath        Prior to Admission Medications   Prescriptions Last Dose Informant Patient Reported? Taking?    SUMAtriptan (IMITREX) 100 mg tablet Not Taking  Yes No   Sig: as needed   Patient not taking: Reported on 2022   acetaminophen (TYLENOL) 500 mg tablet Past Week  No Yes   Sig: Take 1 tablet (500 mg total) by mouth every 6 (six) hours as needed for mild pain   albuterol (PROVENTIL HFA,VENTOLIN HFA) 90 mcg/act inhaler   No No   Sig: Inhale 2 puffs every 4 (four) hours as needed for wheezing (or cough)   Patient not taking: Reported on 2020   butalbital-acetaminophen-caffeine (FIORICET,ESGIC) -40 mg per tablet Not Taking  Yes No   Sig: Take 1 tablet by mouth every 4 (four) hours as needed for headaches   Patient not taking: Reported on 2023   diazepam (VALIUM) 10 mg tablet Not Taking  Yes No   Sig: TAKE 1-2 TABLETS BY MOUTH 1-2 HOURS BEFORE PROCEDURE   Patient not taking: Reported on 2023   ergocalciferol (VITAMIN D2) 50,000 units Not Taking  Yes No   Sig: Take 50,000 Units by mouth once a week Every Wednesday   Patient not taking: Reported on 2023   fluticasone (FLONASE) 50 mcg/act nasal spray   No No   Si spray into each nostril daily for 7 days   gabapentin (NEURONTIN) 300 mg capsule Not Taking  Yes No   Sig: TAKE 1 CAPSULE BY MOUTH 3 TO 4 TIMES A DAY   Patient not taking: Reported on 2023   levonorgestrel (MIRENA) 20 MCG/24HR IUD Not Taking  Yes No   Si each by Intrauterine route once   Patient not taking: Reported on 2023   multivitamin (Selin Jamel) TABS Not Taking  Yes No   Sig: Take 1 tablet by mouth daily   Patient not taking: Reported on 2023   ondansetron (ZOFRAN) 4 mg tablet Not Taking  No No   Sig: Take 1 tablet (4 mg total) by mouth every 8 (eight) hours as needed for nausea or vomiting   Patient not taking: Reported on 2023   oxyCODONE-acetaminophen (PERCOCET)  mg per tablet Not Taking  Yes No   Sig: take 1 tablet by mouth every 6 hours if needed severe pain   Patient not taking: Reported on 2023   topiramate (TOPAMAX) 25 mg tablet Not Taking  Yes No   Sig: Take 25 mg by mouth 2 (two) times a day   Patient not taking: Reported on 2023      Facility-Administered Medications: None       Past Medical History:   Diagnosis Date   • Anxiety    • Arachnoid cyst    • Chronic pain     back   • Depression    • Hypertension    • Migraine    • Mitral valve prolapse    • Psychiatric disorder     Anxiety and Depression       Past Surgical History:   Procedure Laterality Date   •  SECTION     • CHOLECYSTECTOMY     • CRANIOTOMY Right 3/1/2017    Procedure: RIGHT IMAGE GUIDED RETROMASTOID CRANIOTOMY FOR FENESTRATION AND PLACEMENT OF INTERNAL CYSTO-SUBARACHNOID SHUNT;  Surgeon: Rudolph Monroy MD;  Location: [facility];  Service:    • MOUTH SURGERY     • WISDOM TOOTH EXTRACTION         Family History   Problem Relation Age of Onset   • Rheum arthritis Mother    • Hypertension Mother    • Breast cancer Mother    • Polycystic ovary syndrome Mother    • No Known Problems Brother    • Asthma Son    • Diabetes Maternal Grandmother    • Thyroid disease Maternal Grandmother    • Stroke Maternal Grandmother    • Hypertension Maternal Grandmother    • Rheum arthritis Maternal Grandfather    • Lung cancer Maternal Grandfather    • Hypertension Maternal Grandfather    • COPD Maternal Grandfather    • Asthma Son    • Asthma Son    • Ear Disease Son      I have reviewed and agree with the history as documented  E-Cigarette/Vaping   • E-Cigarette Use Current Some Day User      E-Cigarette/Vaping Substances   • Nicotine Yes      Social History     Tobacco Use   • Smoking status: Every Day     Packs/day: 0 50     Types: Cigarettes   • Smokeless tobacco: Never   Vaping Use   • Vaping Use: Some days   • Substances: Nicotine   Substance Use Topics   • Alcohol use: Yes     Comment: occasionally    • Drug use: No       Review of Systems   Constitutional: Negative for appetite change, chills, fatigue, fever and unexpected weight change  HENT: Positive for sore throat  Negative for congestion, ear pain and rhinorrhea  Eyes: Negative for pain and visual disturbance  Respiratory: Negative for cough, chest tightness, shortness of breath and wheezing  Cardiovascular: Negative for chest pain, palpitations and leg swelling  Gastrointestinal: Negative for abdominal pain, constipation, diarrhea, nausea and vomiting  Genitourinary: Negative for difficulty urinating, dysuria, frequency, hematuria, menstrual problem, pelvic pain, vaginal bleeding and vaginal discharge  Musculoskeletal: Negative for arthralgias, back pain and neck pain  Skin: Negative for color change and rash  Neurological: Negative for dizziness, seizures, syncope, light-headedness and headaches  Psychiatric/Behavioral: Negative for sleep disturbance  All other systems reviewed and are negative  Physical Exam  Physical Exam  Vitals and nursing note reviewed  Constitutional:       General: She is not in acute distress  Appearance: She is well-developed  She is not ill-appearing, toxic-appearing or diaphoretic  HENT:      Head: Normocephalic and atraumatic  Nose: No congestion or rhinorrhea        Mouth/Throat:      Mouth: Mucous membranes are moist       Pharynx: Oropharyngeal exudate and posterior oropharyngeal erythema present  Tonsils: Tonsillar exudate present  No tonsillar abscesses  2+ on the right  2+ on the left  Eyes:      General: No scleral icterus  Conjunctiva/sclera: Conjunctivae normal    Cardiovascular:      Rate and Rhythm: Normal rate and regular rhythm  Heart sounds: Normal heart sounds  No murmur heard  No friction rub  No gallop  Pulmonary:      Effort: Pulmonary effort is normal  No respiratory distress  Breath sounds: Normal breath sounds  No wheezing or rales  Abdominal:      Palpations: Abdomen is soft  There is no mass  Tenderness: There is no abdominal tenderness  There is no guarding or rebound  Hernia: No hernia is present  Musculoskeletal:         General: No swelling  Normal range of motion  Cervical back: Normal range of motion and neck supple  Lymphadenopathy:      Cervical: Cervical adenopathy present  Skin:     General: Skin is warm and dry  Capillary Refill: Capillary refill takes less than 2 seconds  Coloration: Skin is not pale  Neurological:      General: No focal deficit present  Mental Status: She is alert and oriented to person, place, and time     Psychiatric:         Mood and Affect: Mood normal          Behavior: Behavior normal          Vital Signs  ED Triage Vitals [01/11/23 2030]   Temperature Pulse Respirations Blood Pressure SpO2   98 2 °F (36 8 °C) (!) 110 18 135/80 98 %      Temp Source Heart Rate Source Patient Position - Orthostatic VS BP Location FiO2 (%)   Tympanic Monitor Sitting Right arm --      Pain Score       5           Vitals:    01/11/23 2030   BP: 135/80   Pulse: (!) 110   Patient Position - Orthostatic VS: Sitting         Visual Acuity      ED Medications  Medications   dexamethasone oral liquid 10 mg 1 mL (10 mg Oral Given 1/11/23 2149)       Diagnostic Studies  Results Reviewed     None                 No orders to display              Procedures  Procedures         ED Course                                             MDM    Disposition  Final diagnoses:   Pharyngitis   20 weeks gestation of pregnancy   Tobacco abuse     Time reflects when diagnosis was documented in both MDM as applicable and the Disposition within this note     Time User Action Codes Description Comment    1/11/2023  9:54 PM Flora Blossom T Add [J02 9] Pharyngitis     1/11/2023  9:55 PM Binstead, Merlinda Rock T Add [Z3A 20] 20 weeks gestation of pregnancy     1/11/2023  9:57 PM Flora Blossom T Add [Z72 0] Tobacco abuse       ED Disposition     ED Disposition   Discharge    Condition   Stable    Date/Time   Wed Jan 11, 2023  9:54 PM    Comment   Sherwin Layneyolanda discharge to home/self care                 Follow-up Information     Follow up With Specialties Details Why Contact Ko Wong DO Family Medicine Schedule an appointment as soon as possible for a visit   Denton Loya  [phone]            Discharge Medication List as of 1/11/2023  9:56 PM      START taking these medications    Details   amoxicillin (AMOXIL) 500 mg capsule Take 1 capsule (500 mg total) by mouth every 8 (eight) hours for 7 days, Starting Wed 1/11/2023, Until Wed 1/18/2023, Normal         CONTINUE these medications which have NOT CHANGED    Details   acetaminophen (TYLENOL) 500 mg tablet Take 1 tablet (500 mg total) by mouth every 6 (six) hours as needed for mild pain, Starting u 10/6/2022, Normal      albuterol (PROVENTIL HFA,VENTOLIN HFA) 90 mcg/act inhaler Inhale 2 puffs every 4 (four) hours as needed for wheezing (or cough), Starting Mon 10/7/2019, Normal      butalbital-acetaminophen-caffeine (FIORICET,ESGIC) -40 mg per tablet Take 1 tablet by mouth every 4 (four) hours as needed for headaches, Historical Med      diazepam (VALIUM) 10 mg tablet TAKE 1-2 TABLETS BY MOUTH 1-2 HOURS BEFORE PROCEDURE, Historical Med ergocalciferol (VITAMIN D2) 50,000 units Take 50,000 Units by mouth once a week Every Wednesday, Starting Sun 10/7/2018, Historical Med      fluticasone (FLONASE) 50 mcg/act nasal spray 1 spray into each nostril daily for 7 days, Starting Fri 12/28/2018, Until Sun 5/24/2020, Normal      gabapentin (NEURONTIN) 300 mg capsule TAKE 1 CAPSULE BY MOUTH 3 TO 4 TIMES A DAY, Historical Med      levonorgestrel (MIRENA) 20 MCG/24HR IUD 1 each by Intrauterine route once, Historical Med      multivitamin (THERAGRAN) TABS Take 1 tablet by mouth daily, Historical Med      ondansetron (ZOFRAN) 4 mg tablet Take 1 tablet (4 mg total) by mouth every 8 (eight) hours as needed for nausea or vomiting, Starting Fri 12/30/2022, Normal      oxyCODONE-acetaminophen (PERCOCET)  mg per tablet take 1 tablet by mouth every 6 hours if needed severe pain, Historical Med      SUMAtriptan (IMITREX) 100 mg tablet as needed, Starting Wed 1/15/2020, Historical Med      topiramate (TOPAMAX) 25 mg tablet Take 25 mg by mouth 2 (two) times a day, Starting Wed 1/15/2020, Historical Med             No discharge procedures on file      PDMP Review     None          ED Provider  Electronically Signed by           Debbie Sampson DO  01/12/23 2011

## 2023-03-18 ENCOUNTER — APPOINTMENT (EMERGENCY)
Dept: CT IMAGING | Facility: HOSPITAL | Age: 38
End: 2023-03-18

## 2023-03-18 ENCOUNTER — OFFICE VISIT (OUTPATIENT)
Dept: URGENT CARE | Facility: MEDICAL CENTER | Age: 38
End: 2023-03-18

## 2023-03-18 ENCOUNTER — HOSPITAL ENCOUNTER (EMERGENCY)
Facility: HOSPITAL | Age: 38
Discharge: HOME | End: 2023-03-18
Attending: EMERGENCY MEDICINE

## 2023-03-18 VITALS
BODY MASS INDEX: 36.28 KG/M2 | WEIGHT: 239.4 LBS | OXYGEN SATURATION: 98 % | SYSTOLIC BLOOD PRESSURE: 144 MMHG | HEART RATE: 119 BPM | HEIGHT: 68 IN | RESPIRATION RATE: 18 BRPM | DIASTOLIC BLOOD PRESSURE: 82 MMHG | TEMPERATURE: 98.3 F

## 2023-03-18 VITALS
OXYGEN SATURATION: 97 % | TEMPERATURE: 97.5 F | HEART RATE: 108 BPM | RESPIRATION RATE: 18 BRPM | SYSTOLIC BLOOD PRESSURE: 140 MMHG | DIASTOLIC BLOOD PRESSURE: 78 MMHG

## 2023-03-18 DIAGNOSIS — K02.9 DENTAL CARIES: ICD-10-CM

## 2023-03-18 DIAGNOSIS — L03.211 FACIAL CELLULITIS: Primary | ICD-10-CM

## 2023-03-18 DIAGNOSIS — K04.7 DENTAL ABSCESS: Primary | ICD-10-CM

## 2023-03-18 LAB
ALBUMIN SERPL BCP-MCNC: 3.6 G/DL (ref 3.5–5)
ALP SERPL-CCNC: 129 U/L (ref 34–104)
ALT SERPL W P-5'-P-CCNC: 11 U/L (ref 7–52)
ANION GAP SERPL CALCULATED.3IONS-SCNC: 9 MMOL/L (ref 4–13)
APTT PPP: 31 SECONDS (ref 23–37)
AST SERPL W P-5'-P-CCNC: 10 U/L (ref 13–39)
BASOPHILS # BLD AUTO: 0.07 THOUSANDS/ÂΜL (ref 0–0.1)
BASOPHILS NFR BLD AUTO: 0 % (ref 0–1)
BILIRUB SERPL-MCNC: 0.42 MG/DL (ref 0.2–1)
BUN SERPL-MCNC: 4 MG/DL (ref 5–25)
CALCIUM SERPL-MCNC: 8.7 MG/DL (ref 8.4–10.2)
CHLORIDE SERPL-SCNC: 99 MMOL/L (ref 96–108)
CO2 SERPL-SCNC: 23 MMOL/L (ref 21–32)
CREAT SERPL-MCNC: 0.43 MG/DL (ref 0.6–1.3)
EOSINOPHIL # BLD AUTO: 0.13 THOUSAND/ÂΜL (ref 0–0.61)
EOSINOPHIL NFR BLD AUTO: 1 % (ref 0–6)
ERYTHROCYTE [DISTWIDTH] IN BLOOD BY AUTOMATED COUNT: 13.5 % (ref 11.6–15.1)
GFR SERPL CREATININE-BSD FRML MDRD: 128 ML/MIN/1.73SQ M
GLUCOSE SERPL-MCNC: 148 MG/DL (ref 65–140)
HCT VFR BLD AUTO: 39.3 % (ref 34.8–46.1)
HGB BLD-MCNC: 13.1 G/DL (ref 11.5–15.4)
IMM GRANULOCYTES # BLD AUTO: 0.19 THOUSAND/UL (ref 0–0.2)
IMM GRANULOCYTES NFR BLD AUTO: 1 % (ref 0–2)
INR PPP: 1.03 (ref 0.84–1.19)
LACTATE SERPL-SCNC: 1.1 MMOL/L (ref 0.5–2)
LYMPHOCYTES # BLD AUTO: 4.29 THOUSANDS/ÂΜL (ref 0.6–4.47)
LYMPHOCYTES NFR BLD AUTO: 21 % (ref 14–44)
MCH RBC QN AUTO: 28.5 PG (ref 26.8–34.3)
MCHC RBC AUTO-ENTMCNC: 33.3 G/DL (ref 31.4–37.4)
MCV RBC AUTO: 85 FL (ref 82–98)
MONOCYTES # BLD AUTO: 1.5 THOUSAND/ÂΜL (ref 0.17–1.22)
MONOCYTES NFR BLD AUTO: 7 % (ref 4–12)
NEUTROPHILS # BLD AUTO: 14.5 THOUSANDS/ÂΜL (ref 1.85–7.62)
NEUTS SEG NFR BLD AUTO: 70 % (ref 43–75)
NRBC BLD AUTO-RTO: 0 /100 WBCS
PLATELET # BLD AUTO: 292 THOUSANDS/UL (ref 149–390)
PMV BLD AUTO: 11.1 FL (ref 8.9–12.7)
POTASSIUM SERPL-SCNC: 3.6 MMOL/L (ref 3.5–5.3)
PROCALCITONIN SERPL-MCNC: 0.09 NG/ML
PROT SERPL-MCNC: 6.9 G/DL (ref 6.4–8.4)
PROTHROMBIN TIME: 13.7 SECONDS (ref 11.6–14.5)
RBC # BLD AUTO: 4.6 MILLION/UL (ref 3.81–5.12)
SODIUM SERPL-SCNC: 131 MMOL/L (ref 135–147)
WBC # BLD AUTO: 20.68 THOUSAND/UL (ref 4.31–10.16)

## 2023-03-18 RX ORDER — UBIDECARENONE 75 MG
CAPSULE ORAL DAILY
COMMUNITY

## 2023-03-18 RX ORDER — INSULIN GLARGINE 100 [IU]/ML
32 INJECTION, SOLUTION SUBCUTANEOUS EVERY 12 HOURS SCHEDULED
COMMUNITY

## 2023-03-18 RX ORDER — CEPHALEXIN 500 MG/1
500 CAPSULE ORAL EVERY 6 HOURS SCHEDULED
Qty: 28 CAPSULE | Refills: 0 | Status: SHIPPED | OUTPATIENT
Start: 2023-03-18 | End: 2023-03-25

## 2023-03-18 RX ORDER — MAGNESIUM 30 MG
30 TABLET ORAL 2 TIMES DAILY
COMMUNITY

## 2023-03-18 RX ORDER — AMOXICILLIN 500 MG/1
500 CAPSULE ORAL EVERY 8 HOURS SCHEDULED
Qty: 21 CAPSULE | Refills: 0 | Status: SHIPPED | OUTPATIENT
Start: 2023-03-18 | End: 2023-03-25

## 2023-03-18 RX ORDER — ACETAMINOPHEN 325 MG/1
650 TABLET ORAL ONCE
Status: COMPLETED | OUTPATIENT
Start: 2023-03-18 | End: 2023-03-18

## 2023-03-18 RX ORDER — INSULIN LISPRO 100 [IU]/ML
12 INJECTION, SOLUTION INTRAVENOUS; SUBCUTANEOUS
COMMUNITY

## 2023-03-18 RX ADMIN — ACETAMINOPHEN 650 MG: 325 TABLET, FILM COATED ORAL at 20:16

## 2023-03-18 RX ADMIN — SODIUM CHLORIDE 1000 ML: 0.9 INJECTION, SOLUTION INTRAVENOUS at 19:48

## 2023-03-18 NOTE — ED PROVIDER NOTES
History  Chief Complaint   Patient presents with   • Facial Swelling     Patient states she has had swollen on the right side of her face for the passed two days  Patient states she went to urgent care and they prescribed amoxicillin and told her to come here if it worsens  Patient also states that everything on the right side of her face hurts  77-year-old female 7 months pregnant presenting to the ED today for right-sided facial swelling  Patient states that she has been having dental pain for the last 2 or 3 days  She woke up this morning and had swelling of the right face with increasing pain  She denies any fevers but states that she has been having more difficulty with opening her mouth  She is been taking Tylenol with minimal relief  She was started on amoxicillin by the urgent care and has taken 2 doses since then  She denies any nausea or vomiting  Denies any chest pain or shortness of breath  Prior to Admission Medications   Prescriptions Last Dose Informant Patient Reported? Taking?    SUMAtriptan (IMITREX) 100 mg tablet   Yes No   Sig: as needed   Patient not taking: Reported on 12/27/2022   acetaminophen (TYLENOL) 500 mg tablet   No No   Sig: Take 1 tablet (500 mg total) by mouth every 6 (six) hours as needed for mild pain   albuterol (PROVENTIL HFA,VENTOLIN HFA) 90 mcg/act inhaler   No No   Sig: Inhale 2 puffs every 4 (four) hours as needed for wheezing (or cough)   Patient not taking: Reported on 7/2/2020   amoxicillin (AMOXIL) 500 mg capsule   No No   Sig: Take 1 capsule (500 mg total) by mouth every 8 (eight) hours for 7 days   butalbital-acetaminophen-caffeine (FIORICET,ESGIC) -40 mg per tablet   Yes No   Sig: Take 1 tablet by mouth every 4 (four) hours as needed for headaches   Patient not taking: Reported on 1/11/2023   cyanocobalamin (VITAMIN B-12) 100 mcg tablet   Yes No   Sig: Take by mouth daily   diazepam (VALIUM) 10 mg tablet   Yes No   Sig: TAKE 1-2 TABLETS BY MOUTH 1-2 HOURS BEFORE PROCEDURE   Patient not taking: Reported on 2023   ergocalciferol (VITAMIN D2) 50,000 units   Yes No   Sig: Take 50,000 Units by mouth once a week Every Wednesday   Patient not taking: Reported on 2023   fluticasone (FLONASE) 50 mcg/act nasal spray   No No   Si spray into each nostril daily for 7 days   gabapentin (NEURONTIN) 300 mg capsule   Yes No   Sig: TAKE 1 CAPSULE BY MOUTH 3 TO 4 TIMES A DAY   Patient not taking: Reported on 2023   insulin glargine (LANTUS) 100 units/mL subcutaneous injection   Yes No   Sig: Inject 32 Units under the skin every 12 (twelve) hours   insulin lispro (HumaLOG) 100 units/mL injection   Yes No   Sig: Inject 12 Units under the skin 3 (three) times a day with meals   levonorgestrel (MIRENA) 20 MCG/24HR IUD   Yes No   Si each by Intrauterine route once   Patient not taking: Reported on 2023   magnesium 30 MG tablet   Yes No   Sig: Take 30 mg by mouth 2 (two) times a day   multivitamin (THERAGRAN) TABS   Yes No   Sig: Take 1 tablet by mouth daily   Patient not taking: Reported on 2023   ondansetron (ZOFRAN) 4 mg tablet   No No   Sig: Take 1 tablet (4 mg total) by mouth every 8 (eight) hours as needed for nausea or vomiting   Patient not taking: Reported on 2023   oxyCODONE-acetaminophen (PERCOCET)  mg per tablet   Yes No   Sig: take 1 tablet by mouth every 6 hours if needed severe pain   Patient not taking: Reported on 2023   topiramate (TOPAMAX) 25 mg tablet   Yes No   Sig: Take 25 mg by mouth 2 (two) times a day   Patient not taking: Reported on 2023      Facility-Administered Medications: None       Past Medical History:   Diagnosis Date   • Anxiety    • Arachnoid cyst    • Chronic pain     back   • Depression    • Diabetes mellitus ([address] )    • Hypertension    • Migraine    • Mitral valve prolapse    • Psychiatric disorder     Anxiety and Depression       Past Surgical History:   Procedure Laterality Date   •  SECTION     • CHOLECYSTECTOMY     • CRANIOTOMY Right 3/1/2017    Procedure: RIGHT IMAGE GUIDED RETROMASTOID CRANIOTOMY FOR FENESTRATION AND PLACEMENT OF INTERNAL CYSTO-SUBARACHNOID SHUNT;  Surgeon: Neisha Hernandez MD;  Location: [facility];  Service:    • MOUTH SURGERY     • WISDOM TOOTH EXTRACTION         Family History   Problem Relation Age of Onset   • Rheum arthritis Mother    • Hypertension Mother    • Breast cancer Mother    • Polycystic ovary syndrome Mother    • No Known Problems Brother    • Asthma Son    • Diabetes Maternal Grandmother    • Thyroid disease Maternal Grandmother    • Stroke Maternal Grandmother    • Hypertension Maternal Grandmother    • Rheum arthritis Maternal Grandfather    • Lung cancer Maternal Grandfather    • Hypertension Maternal Grandfather    • COPD Maternal Grandfather    • Asthma Son    • Asthma Son    • Ear Disease Son      I have reviewed and agree with the history as documented  E-Cigarette/Vaping   • E-Cigarette Use Current Some Day User      E-Cigarette/Vaping Substances   • Nicotine Yes      Social History     Tobacco Use   • Smoking status: Every Day     Packs/day: 0 25     Types: Cigarettes   • Smokeless tobacco: Never   Vaping Use   • Vaping Use: Some days   • Substances: Nicotine   Substance Use Topics   • Alcohol use: Not Currently     Comment: occasionally    • Drug use: No       Review of Systems   Constitutional: Negative for chills and fever  HENT: Positive for facial swelling  Negative for hearing loss  Eyes: Negative for visual disturbance  Respiratory: Negative for shortness of breath  Cardiovascular: Negative for chest pain  Gastrointestinal: Negative for abdominal pain, constipation, diarrhea, nausea and vomiting  Genitourinary: Negative for difficulty urinating  Musculoskeletal: Negative for myalgias  Skin: Negative for color change  Neurological: Negative for dizziness and headaches     Psychiatric/Behavioral: Negative for agitation  All other systems reviewed and are negative  Physical Exam  Physical Exam  Vitals and nursing note reviewed  Constitutional:       General: She is not in acute distress  Appearance: Normal appearance  She is well-developed  She is not ill-appearing  HENT:      Head: Normocephalic and atraumatic  Right Ear: External ear normal       Left Ear: External ear normal       Nose: Nose normal       Mouth/Throat:      Mouth: Mucous membranes are moist       Pharynx: Oropharynx is clear  No oropharyngeal exudate  Comments: Oropharynx showing no exudates or erythema  No obvious periapical abscesses that I could feel on exam   She does have obvious facial swelling on the right  Eyes:      General:         Right eye: No discharge  Left eye: No discharge  Extraocular Movements: Extraocular movements intact  Conjunctiva/sclera: Conjunctivae normal       Pupils: Pupils are equal, round, and reactive to light  Cardiovascular:      Rate and Rhythm: Normal rate and regular rhythm  Heart sounds: Normal heart sounds  No murmur heard  No friction rub  No gallop  Pulmonary:      Effort: Pulmonary effort is normal  No respiratory distress  Breath sounds: Normal breath sounds  No stridor  No wheezing  Abdominal:      General: Bowel sounds are normal  There is no distension  Palpations: Abdomen is soft  Tenderness: There is no abdominal tenderness  Musculoskeletal:         General: No swelling  Normal range of motion  Cervical back: Normal range of motion and neck supple  No rigidity  Skin:     General: Skin is warm and dry  Capillary Refill: Capillary refill takes less than 2 seconds  Neurological:      General: No focal deficit present  Mental Status: She is alert and oriented to person, place, and time  Mental status is at baseline  Cranial Nerves: No cranial nerve deficit  Motor: No weakness        Gait: Gait normal  Psychiatric:         Mood and Affect: Mood normal          Behavior: Behavior normal          Vital Signs  ED Triage Vitals   Temperature Pulse Respirations Blood Pressure SpO2   03/18/23 1905 03/18/23 1905 03/18/23 1905 03/18/23 1907 03/18/23 1905   97 5 °F (36 4 °C) (!) 122 18 140/78 97 %      Temp Source Heart Rate Source Patient Position - Orthostatic VS BP Location FiO2 (%)   03/18/23 1905 03/18/23 1905 -- -- --   Temporal Monitor         Pain Score       03/18/23 2016       10 - Worst Possible Pain           Vitals:    03/18/23 1905 03/18/23 1907 03/18/23 2240   BP:  140/78    Pulse: (!) 122  (!) 108         Visual Acuity      ED Medications  Medications   sodium chloride 0 9 % bolus 1,000 mL (1,000 mL Intravenous New Bag 3/18/23 1948)   acetaminophen (TYLENOL) tablet 650 mg (650 mg Oral Given 3/18/23 2016)       Diagnostic Studies  Results Reviewed     Procedure Component Value Units Date/Time    Protime-INR [[id number]]  (Normal) Collected: 03/18/23 1940    Lab Status: Final result Specimen: Blood from Arm, Left Updated: 03/18/23 2020     Protime 13 7 seconds      INR 1 03    APTT [[id number]]  (Normal) Collected: 03/18/23 1940    Lab Status: Final result Specimen: Blood from Arm, Left Updated: 03/18/23 2020     PTT 31 seconds     Procalcitonin [[id number]]  (Normal) Collected: 03/18/23 1940    Lab Status: Final result Specimen: Blood from Arm, Left Updated: 03/18/23 2014     Procalcitonin 0 09 ng/ml     Comprehensive metabolic panel [[id number]]  (Abnormal) Collected: 03/18/23 1940    Lab Status: Final result Specimen: Blood from Arm, Left Updated: 03/18/23 2007     Sodium 131 mmol/L      Potassium 3 6 mmol/L      Chloride 99 mmol/L      CO2 23 mmol/L      ANION GAP 9 mmol/L      BUN 4 mg/dL      Creatinine 0 43 mg/dL      Glucose 148 mg/dL      Calcium 8 7 mg/dL      AST 10 U/L      ALT 11 U/L      Alkaline Phosphatase 129 U/L      Total Protein 6 9 g/dL      Albumin 3 6 g/dL      Total Bilirubin 0 42 mg/dL eGFR 128 ml/min/1 73sq m     Narrative:      [place] guidelines for Chronic Kidney Disease (CKD):   •  Stage 1 with normal or high GFR (GFR > 90 mL/min/1 73 square meters)  •  Stage 2 Mild CKD (GFR = 60-89 mL/min/1 73 square meters)  •  Stage 3A Moderate CKD (GFR = 45-59 mL/min/1 73 square meters)  •  Stage 3B Moderate CKD (GFR = 30-44 mL/min/1 73 square meters)  •  Stage 4 Severe CKD (GFR = 15-29 mL/min/1 73 square meters)  •  Stage 5 End Stage CKD (GFR <15 mL/min/1 73 square meters)  Note: GFR calculation is accurate only with a steady state creatinine    Lactic acid [[id number]]  (Normal) Collected: 03/18/23 1940    Lab Status: Final result Specimen: Blood from Arm, Left Updated: 03/18/23 2005     LACTIC ACID 1 1 mmol/L     Narrative:      Result may be elevated if tourniquet was used during collection  CBC and differential [[id number]]  (Abnormal) Collected: 03/18/23 1940    Lab Status: Final result Specimen: Blood from Arm, Left Updated: 03/18/23 1950     WBC 20 68 Thousand/uL      RBC 4 60 Million/uL      Hemoglobin 13 1 g/dL      Hematocrit 39 3 %      MCV 85 fL      MCH 28 5 pg      MCHC 33 3 g/dL      RDW 13 5 %      MPV 11 1 fL      Platelets 658 Thousands/uL      nRBC 0 /100 WBCs      Neutrophils Relative 70 %      Immat GRANS % 1 %      Lymphocytes Relative 21 %      Monocytes Relative 7 %      Eosinophils Relative 1 %      Basophils Relative 0 %      Neutrophils Absolute 14 50 Thousands/µL      Immature Grans Absolute 0 19 Thousand/uL      Lymphocytes Absolute 4 29 Thousands/µL      Monocytes Absolute 1 50 Thousand/µL      Eosinophils Absolute 0 13 Thousand/µL      Basophils Absolute 0 07 Thousands/µL     Blood culture [[id number]] Collected: 03/18/23 1940    Lab Status: In process Specimen: Blood from Arm, Right Updated: 03/18/23 1947    Blood culture [[id number]] Collected: 03/18/23 1940    Lab Status:  In process Specimen: Blood from Arm, Left Updated: 03/18/23 1947 CT facial bones wo contrast   Final Result by Nellie Reyes DO (03/18 2231)      Dental caries and periodontal disease as described  Some stranding of the soft tissues in the right perimaxillary and perimandibular region noted (axial image 24, series 3, for example), consider a cellulitis but no discrete organizing collection is    seen  Sinus disease as described, most prominent in the right maxillary sinus; acute on chronic right maxillary sinusitis is considered  Other findings as above  Workstation performed: [id number]                    Procedures  Procedures         ED Course  ED Course as of 03/18/23 2257   Sat Mar 18, 2023   1954 WBC(!): 20 68  Likely secondary to her infection with source likely from dental infection   2005 LACTIC ACID: 1 1 2027 Procalcitonin: 0 09   2027 PTT: 31 2027 PROTIME: 13 7 2027 POCT INR: 1 03 2212 Called Radiology Reading room to Have CT scan read                                             Medical Decision Making  57-year-old female currently pregnant presenting to the ED today for facial swelling  At this time concern for facial cellulitis versus facial abscess versus dental abscess  Given that the patient is pregnant while I would have like to do a CT facial bones with contrast I do not want to cause any harm to the fetus  We will do a CT facial bones without contrast to evaluate for possible abscess  Given her tachycardia we will also check CBC, CMP, procalcitonin, lactic, coag panel, blood cultures  Patient was found to have a white blood cell count of 20 which is likely consistent with her facial cellulitis that was seen on CT scan  She also was found to have dental caries which I did discuss with her  I discussed with her that she should follow-up with her dentist to have those teeth pulled but I also gave her information to follow-up with oral maxillofacial surgery should she require it    She is on amoxicillin now however given her facial cellulitis I will also start her on Keflex  Strict return to ER precautions given the patient was discharged home  She is tolerating p o  intake without any difficulty here  No concerns for airway compromise but I did discuss with her should she start to have any difficulty with swallowing or any trismus that she should return to the ED  Amount and/or Complexity of Data Reviewed  Labs: ordered  Decision-making details documented in ED Course  Radiology: ordered  Risk  OTC drugs  Prescription drug management  Disposition  Final diagnoses:   Facial cellulitis   Dental caries     Time reflects when diagnosis was documented in both MDM as applicable and the Disposition within this note     Time User Action Codes Description Comment    3/18/2023 10:38 PM Lulu Villalta Add [H48 338] Facial cellulitis     3/18/2023 10:39 PM Lulu Villalta Add [K02 9] Dental caries       ED Disposition     ED Disposition   Discharge    Condition   Stable    Date/Time   Sat Mar 18, 2023 10:38 PM    Comment   Laura Simon discharge to home/self care  Follow-up Information     Follow up With Specialties Details Why Macario Millan 13, DO Family Medicine Schedule an appointment as soon as possible for a visit in 2 days for follow up [facility] Torres[address] [facility] [place]  Schedule an appointment as soon as possible for a visit  for follow up [address] [place] [address]  [phone]          Patient's Medications   Discharge Prescriptions    CEPHALEXIN (KEFLEX) 500 MG CAPSULE    Take 1 capsule (500 mg total) by mouth every 6 (six) hours for 7 days       Start Date: 3/18/2023 End Date: 3/25/2023       Order Dose: 500 mg       Quantity: 28 capsule    Refills: 0       No discharge procedures on file      PDMP Review     None          ED Provider  Electronically Signed by           Glenda Fitzpatrick MD  03/18/23 9787

## 2023-03-18 NOTE — PROGRESS NOTES
[id number]        NAME: Ramila Pyle is a 42 y o  female  : 1985    MRN: [id number]  DATE: 2023  TIME: 11:03 AM    Assessment and Plan   Dental abscess [K04 7]  1  Dental abscess  amoxicillin (AMOXIL) 500 mg capsule            Patient Instructions       Follow up with PCP in 3-5 days  Proceed to  ER if symptoms worsen  Chief Complaint     Chief Complaint   Patient presents with   • Facial Pain     Facial swelling with pain behind right eye as well as pain in jaw  Tylenol and orajel used with no relief          History of Present Illness       Patient presents with swelling of her right cheek and pain to her right upper teeth started upon waking this morning  She denies any fevers chills or difficulty swallowing  History of poor dentition and cannot have her teeth repaired until after she has child  Review of Systems   Review of Systems   Constitutional: Negative for chills and fever  HENT: Positive for dental problem  Negative for trouble swallowing  Hematological: Negative for adenopathy           Current Medications       Current Outpatient Medications:   •  acetaminophen (TYLENOL) 500 mg tablet, Take 1 tablet (500 mg total) by mouth every 6 (six) hours as needed for mild pain, Disp: 30 tablet, Rfl: 0  •  amoxicillin (AMOXIL) 500 mg capsule, Take 1 capsule (500 mg total) by mouth every 8 (eight) hours for 7 days, Disp: 21 capsule, Rfl: 0  •  cyanocobalamin (VITAMIN B-12) 100 mcg tablet, Take by mouth daily, Disp: , Rfl:   •  insulin glargine (LANTUS) 100 units/mL subcutaneous injection, Inject 32 Units under the skin every 12 (twelve) hours, Disp: , Rfl:   •  insulin lispro (HumaLOG) 100 units/mL injection, Inject 12 Units under the skin 3 (three) times a day with meals, Disp: , Rfl:   •  magnesium 30 MG tablet, Take 30 mg by mouth 2 (two) times a day, Disp: , Rfl:   •  albuterol (PROVENTIL HFA,VENTOLIN HFA) 90 mcg/act inhaler, Inhale 2 puffs every 4 (four) hours as needed for wheezing (or cough) (Patient not taking: Reported on 7/2/2020), Disp: 1 Inhaler, Rfl: 1  •  butalbital-acetaminophen-caffeine (FIORICET,ESGIC) -40 mg per tablet, Take 1 tablet by mouth every 4 (four) hours as needed for headaches (Patient not taking: Reported on 1/11/2023), Disp: , Rfl:   •  diazepam (VALIUM) 10 mg tablet, TAKE 1-2 TABLETS BY MOUTH 1-2 HOURS BEFORE PROCEDURE (Patient not taking: Reported on 1/11/2023), Disp: , Rfl: 0  •  ergocalciferol (VITAMIN D2) 50,000 units, Take 50,000 Units by mouth once a week Every Wednesday (Patient not taking: Reported on 1/11/2023), Disp: , Rfl: 0  •  fluticasone (FLONASE) 50 mcg/act nasal spray, 1 spray into each nostril daily for 7 days, Disp: 1 Bottle, Rfl: 0  •  gabapentin (NEURONTIN) 300 mg capsule, TAKE 1 CAPSULE BY MOUTH 3 TO 4 TIMES A DAY (Patient not taking: Reported on 1/11/2023), Disp: , Rfl: 0  •  levonorgestrel (MIRENA) 20 MCG/24HR IUD, 1 each by Intrauterine route once (Patient not taking: Reported on 1/11/2023), Disp: , Rfl:   •  multivitamin (THERAGRAN) TABS, Take 1 tablet by mouth daily (Patient not taking: Reported on 1/11/2023), Disp: , Rfl:   •  ondansetron (ZOFRAN) 4 mg tablet, Take 1 tablet (4 mg total) by mouth every 8 (eight) hours as needed for nausea or vomiting (Patient not taking: Reported on 1/11/2023), Disp: 12 tablet, Rfl: 0  •  oxyCODONE-acetaminophen (PERCOCET)  mg per tablet, take 1 tablet by mouth every 6 hours if needed severe pain (Patient not taking: Reported on 1/11/2023), Disp: , Rfl: 0  •  SUMAtriptan (IMITREX) 100 mg tablet, as needed (Patient not taking: Reported on 12/27/2022), Disp: , Rfl:   •  topiramate (TOPAMAX) 25 mg tablet, Take 25 mg by mouth 2 (two) times a day (Patient not taking: Reported on 1/11/2023), Disp: , Rfl:     Current Allergies     Allergies as of 03/18/2023   • (No Known Allergies)            The following portions of the patient's history were reviewed and updated as appropriate: allergies, current medications, past family history, past medical history, past social history, past surgical history and problem list      Past Medical History:   Diagnosis Date   • Anxiety    • Arachnoid cyst    • Chronic pain     back   • Depression    • Diabetes mellitus ([address] )    • Hypertension    • Migraine    • Mitral valve prolapse    • Psychiatric disorder     Anxiety and Depression       Past Surgical History:   Procedure Laterality Date   •  SECTION     • CHOLECYSTECTOMY     • CRANIOTOMY Right 3/1/2017    Procedure: RIGHT IMAGE GUIDED RETROMASTOID CRANIOTOMY FOR FENESTRATION AND PLACEMENT OF INTERNAL CYSTO-SUBARACHNOID SHUNT;  Surgeon: David Urbano MD;  Location: [facility];  Service:    • MOUTH SURGERY     • WISDOM TOOTH EXTRACTION         Family History   Problem Relation Age of Onset   • Rheum arthritis Mother    • Hypertension Mother    • Breast cancer Mother    • Polycystic ovary syndrome Mother    • No Known Problems Brother    • Asthma Son    • Diabetes Maternal Grandmother    • Thyroid disease Maternal Grandmother    • Stroke Maternal Grandmother    • Hypertension Maternal Grandmother    • Rheum arthritis Maternal Grandfather    • Lung cancer Maternal Grandfather    • Hypertension Maternal Grandfather    • COPD Maternal Grandfather    • Asthma Son    • Asthma Son    • Ear Disease Son          Medications have been verified  Objective   /82   Pulse (!) 119   Temp 98 3 °F (36 8 °C)   Resp 18   Ht 5' 8" (1 727 m)   Wt 109 kg (239 lb 6 4 oz)   SpO2 98%   BMI 36 40 kg/m²   No LMP recorded  Patient is pregnant  Physical Exam     Physical Exam  Vitals and nursing note reviewed  Constitutional:       Appearance: Normal appearance  HENT:      Head: Normocephalic  Comments: Swelling right cheek     Mouth/Throat:      Mouth: Mucous membranes are moist       Comments: Poor dentition  Eyes:      Extraocular Movements: Extraocular movements intact  Conjunctiva/sclera: Conjunctivae normal    Cardiovascular:      Rate and Rhythm: Regular rhythm  Tachycardia present  Heart sounds: Normal heart sounds  Pulmonary:      Effort: Pulmonary effort is normal       Breath sounds: Normal breath sounds  Skin:     General: Skin is warm  Neurological:      Mental Status: She is alert

## 2023-03-19 NOTE — DISCHARGE INSTRUCTIONS
Continue the amoxicilin    You will be started on keflex as well 4 times a day for the next 7 days  Return to the ED if you have any worsening symptoms or fever

## 2023-03-20 LAB
ATRIAL RATE: 111 BPM
P AXIS: 26 DEGREES
PR INTERVAL: 130 MS
QRS AXIS: 16 DEGREES
QRSD INTERVAL: 94 MS
QT INTERVAL: 330 MS
QTC INTERVAL: 448 MS
T WAVE AXIS: 59 DEGREES
VENTRICULAR RATE: 111 BPM

## 2023-03-24 LAB
BACTERIA BLD CULT: NORMAL
BACTERIA BLD CULT: NORMAL

## 2023-06-15 ENCOUNTER — OFFICE VISIT (OUTPATIENT)
Dept: URGENT CARE | Facility: MEDICAL CENTER | Age: 38
End: 2023-06-15
Payer: COMMERCIAL

## 2023-06-15 VITALS
TEMPERATURE: 98 F | WEIGHT: 220 LBS | HEIGHT: 68 IN | DIASTOLIC BLOOD PRESSURE: 70 MMHG | OXYGEN SATURATION: 99 % | HEART RATE: 105 BPM | RESPIRATION RATE: 18 BRPM | SYSTOLIC BLOOD PRESSURE: 124 MMHG | BODY MASS INDEX: 33.34 KG/M2

## 2023-06-15 DIAGNOSIS — Z51.89 VISIT FOR WOUND CHECK: Primary | ICD-10-CM

## 2023-06-15 PROCEDURE — 99212 OFFICE O/P EST SF 10 MIN: CPT | Performed by: PHYSICIAN ASSISTANT

## 2023-06-15 NOTE — PROGRESS NOTES
[facility]        NAME: Ana Montaño is a 34 y o  female  : 1985    MRN: [id number]  DATE: Maris 15, 2023  TIME: 6:05 PM    Assessment and Plan   Visit for wound check [Z51 [id number]]  1  Visit for wound check              Patient Instructions     If symptoms worsen follow-up with OB  Follow up with PCP in 3-5 days  Proceed to  ER if symptoms worsen  Chief Complaint     Chief Complaint   Patient presents with   • Wound Infection     C section scar red and draining pus for 3 days         History of Present Illness       Had a  section on May 9th, she states the left lateral portion of her incision is tender has slight drainage  She is keeping the area dry  No fever or chills  Follow-up with obstetrician on   Review of Systems   Review of Systems   Constitutional: Negative for chills and fever  Skin: Positive for wound           Current Medications       Current Outpatient Medications:   •  acetaminophen (TYLENOL) 500 mg tablet, Take 1 tablet (500 mg total) by mouth every 6 (six) hours as needed for mild pain (Patient not taking: Reported on 6/15/2023), Disp: 30 tablet, Rfl: 0  •  albuterol (PROVENTIL HFA,VENTOLIN HFA) 90 mcg/act inhaler, Inhale 2 puffs every 4 (four) hours as needed for wheezing (or cough) (Patient not taking: Reported on 2020), Disp: 1 Inhaler, Rfl: 1  •  butalbital-acetaminophen-caffeine (FIORICET,ESGIC) -40 mg per tablet, Take 1 tablet by mouth every 4 (four) hours as needed for headaches (Patient not taking: Reported on 2023), Disp: , Rfl:   •  cyanocobalamin (VITAMIN B-12) 100 mcg tablet, Take by mouth daily (Patient not taking: Reported on 6/15/2023), Disp: , Rfl:   •  diazepam (VALIUM) 10 mg tablet, TAKE 1-2 TABLETS BY MOUTH 1-2 HOURS BEFORE PROCEDURE (Patient not taking: Reported on 2023), Disp: , Rfl: 0  •  ergocalciferol (VITAMIN D2) 50,000 units, Take 50,000 Units by mouth once a week Every Wednesday (Patient not taking: Reported on 1/11/2023), Disp: , Rfl: 0  •  fluticasone (FLONASE) 50 mcg/act nasal spray, 1 spray into each nostril daily for 7 days, Disp: 1 Bottle, Rfl: 0  •  gabapentin (NEURONTIN) 300 mg capsule, TAKE 1 CAPSULE BY MOUTH 3 TO 4 TIMES A DAY (Patient not taking: Reported on 1/11/2023), Disp: , Rfl: 0  •  insulin glargine (LANTUS) 100 units/mL subcutaneous injection, Inject 32 Units under the skin every 12 (twelve) hours (Patient not taking: Reported on 6/15/2023), Disp: , Rfl:   •  insulin lispro (HumaLOG) 100 units/mL injection, Inject 12 Units under the skin 3 (three) times a day with meals (Patient not taking: Reported on 6/15/2023), Disp: , Rfl:   •  levonorgestrel (MIRENA) 20 MCG/24HR IUD, 1 each by Intrauterine route once (Patient not taking: Reported on 1/11/2023), Disp: , Rfl:   •  magnesium 30 MG tablet, Take 30 mg by mouth 2 (two) times a day (Patient not taking: Reported on 6/15/2023), Disp: , Rfl:   •  multivitamin (THERAGRAN) TABS, Take 1 tablet by mouth daily (Patient not taking: Reported on 1/11/2023), Disp: , Rfl:   •  ondansetron (ZOFRAN) 4 mg tablet, Take 1 tablet (4 mg total) by mouth every 8 (eight) hours as needed for nausea or vomiting (Patient not taking: Reported on 1/11/2023), Disp: 12 tablet, Rfl: 0  •  oxyCODONE-acetaminophen (PERCOCET)  mg per tablet, take 1 tablet by mouth every 6 hours if needed severe pain (Patient not taking: Reported on 1/11/2023), Disp: , Rfl: 0  •  SUMAtriptan (IMITREX) 100 mg tablet, as needed (Patient not taking: Reported on 12/27/2022), Disp: , Rfl:   •  topiramate (TOPAMAX) 25 mg tablet, Take 25 mg by mouth 2 (two) times a day (Patient not taking: Reported on 1/11/2023), Disp: , Rfl:     Current Allergies     Allergies as of 06/15/2023   • (No Known Allergies)            The following portions of the patient's history were reviewed and updated as appropriate: allergies, current medications, past family history, past medical history, past social history, "past surgical history and problem list      Past Medical History:   Diagnosis Date   • Anxiety    • Arachnoid cyst    • Chronic pain     back   • Depression    • Diabetes mellitus ([address] )    • Hypertension    • Migraine    • Mitral valve prolapse    • Psychiatric disorder     Anxiety and Depression       Past Surgical History:   Procedure Laterality Date   •  SECTION     • CHOLECYSTECTOMY     • CRANIOTOMY Right 3/1/2017    Procedure: RIGHT IMAGE GUIDED RETROMASTOID CRANIOTOMY FOR FENESTRATION AND PLACEMENT OF INTERNAL CYSTO-SUBARACHNOID SHUNT;  Surgeon: Amanda Urbano MD;  Location: [facility];  Service:    • MOUTH SURGERY     • WISDOM TOOTH EXTRACTION         Family History   Problem Relation Age of Onset   • Rheum arthritis Mother    • Hypertension Mother    • Breast cancer Mother    • Polycystic ovary syndrome Mother    • No Known Problems Brother    • Asthma Son    • Diabetes Maternal Grandmother    • Thyroid disease Maternal Grandmother    • Stroke Maternal Grandmother    • Hypertension Maternal Grandmother    • Rheum arthritis Maternal Grandfather    • Lung cancer Maternal Grandfather    • Hypertension Maternal Grandfather    • COPD Maternal Grandfather    • Asthma Son    • Asthma Son    • Ear Disease Son          Medications have been verified  Objective   /70   Pulse 105   Temp 98 °F (36 7 °C) (Temporal)   Resp 18   Ht 5' 8\" (1 727 m)   Wt 99 8 kg (220 lb)   SpO2 99%   BMI 33 45 kg/m²   No LMP recorded  Patient is pregnant  Physical Exam     Physical Exam  Vitals and nursing note reviewed  Constitutional:       Appearance: Normal appearance  HENT:      Head: Normocephalic and atraumatic  Cardiovascular:      Rate and Rhythm: Normal rate and regular rhythm  Pulmonary:      Effort: Pulmonary effort is normal    Abdominal:      Comments: Healed abdominal incision  Tenderness or open wound or drainage noted on the left lateral aspect   Skin:     General: Skin is warm   " Neurological:      Mental Status: She is alert

## 2023-11-18 ENCOUNTER — HOSPITAL ENCOUNTER (EMERGENCY)
Facility: HOSPITAL | Age: 38
Discharge: HOME | End: 2023-11-18
Attending: EMERGENCY MEDICINE
Payer: COMMERCIAL

## 2023-11-18 ENCOUNTER — APPOINTMENT (EMERGENCY)
Dept: RADIOLOGY | Facility: HOSPITAL | Age: 38
End: 2023-11-18
Payer: COMMERCIAL

## 2023-11-18 VITALS
BODY MASS INDEX: 33.45 KG/M2 | TEMPERATURE: 97.9 F | HEART RATE: 125 BPM | DIASTOLIC BLOOD PRESSURE: 75 MMHG | SYSTOLIC BLOOD PRESSURE: 139 MMHG | WEIGHT: 220 LBS | OXYGEN SATURATION: 98 % | RESPIRATION RATE: 18 BRPM

## 2023-11-18 DIAGNOSIS — S62.346A NONDISPLACED FRACTURE OF BASE OF FIFTH METACARPAL BONE, RIGHT HAND, INITIAL ENCOUNTER FOR CLOSED FRACTURE: Primary | ICD-10-CM

## 2023-11-18 PROCEDURE — 29125 APPL SHORT ARM SPLINT STATIC: CPT | Performed by: EMERGENCY MEDICINE

## 2023-11-18 PROCEDURE — 73130 X-RAY EXAM OF HAND: CPT

## 2023-11-18 PROCEDURE — 99285 EMERGENCY DEPT VISIT HI MDM: CPT

## 2023-11-18 PROCEDURE — 99284 EMERGENCY DEPT VISIT MOD MDM: CPT | Performed by: EMERGENCY MEDICINE

## 2023-11-18 PROCEDURE — 73110 X-RAY EXAM OF WRIST: CPT

## 2023-11-18 RX ORDER — OXYCODONE HYDROCHLORIDE 5 MG/1
5 TABLET ORAL EVERY 4 HOURS PRN
Qty: 8 TABLET | Refills: 0 | Status: SHIPPED | OUTPATIENT
Start: 2023-11-18 | End: 2023-11-28

## 2023-11-18 RX ORDER — IBUPROFEN 400 MG/1
400 TABLET ORAL ONCE
Status: COMPLETED | OUTPATIENT
Start: 2023-11-18 | End: 2023-11-18

## 2023-11-18 RX ORDER — ACETAMINOPHEN 325 MG/1
975 TABLET ORAL ONCE
Status: COMPLETED | OUTPATIENT
Start: 2023-11-18 | End: 2023-11-18

## 2023-11-18 RX ADMIN — IBUPROFEN 400 MG: 400 TABLET ORAL at 04:45

## 2023-11-18 RX ADMIN — ACETAMINOPHEN 975 MG: 325 TABLET, FILM COATED ORAL at 04:45
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
## 2023-11-18 NOTE — DISCHARGE INSTRUCTIONS
Please follow up with orthopedics within one week. Please take motrin and tylenol every 6 hours as needed for pain, You may take oxycodone every 4 hours as needed for severe pain.

## 2023-11-18 NOTE — Clinical Note
Bernadette Queen was seen and treated in our emergency department on 11/18/2023. Diagnosis:     Yudith Collins  . She may return on this date:     No use of the right arm until cleared by orthopedics     If you have any questions or concerns, please don't hesitate to call.       Maribel Szymanski MD    ______________________________           _______________          _______________  Hospital Representative                              Date                                Time

## 2023-11-20 ENCOUNTER — OFFICE VISIT (OUTPATIENT)
Dept: OBGYN CLINIC | Facility: CLINIC | Age: 38
End: 2023-11-20
Payer: COMMERCIAL

## 2023-11-20 VITALS
BODY MASS INDEX: 33.07 KG/M2 | DIASTOLIC BLOOD PRESSURE: 91 MMHG | WEIGHT: 218.2 LBS | HEART RATE: 99 BPM | SYSTOLIC BLOOD PRESSURE: 126 MMHG | HEIGHT: 68 IN

## 2023-11-20 DIAGNOSIS — S62.346A NONDISPLACED FRACTURE OF BASE OF FIFTH METACARPAL BONE, RIGHT HAND, INITIAL ENCOUNTER FOR CLOSED FRACTURE: ICD-10-CM

## 2023-11-20 PROCEDURE — 99204 OFFICE O/P NEW MOD 45 MIN: CPT | Performed by: FAMILY MEDICINE

## 2023-11-20 NOTE — PROGRESS NOTES
[facility] [address] [id number] [phone]      Assessment:  1. Nondisplaced fracture of base of fifth metacarpal bone, right hand, initial encounter for closed fracture  -     Ambulatory Referral to Orthopedic Surgery  -     Brace        Plan:  Patient Instructions   F/u 1 wk  XR- R hand 1 wk  EXO brace  Icing/OTC pain meds/elevation as needed. Return in about 1 week (around 11/27/2023) for Recheck. Chief Complaint:  Chief Complaint   Patient presents with    Right Hand - Fracture       Subjective:   HPI    Patient ID: Liseth Bojorquez is a 34 y.o. female     Here for R 5th metacarpal base fx  She fell on Friday night and landed on her hand  Hand swollen  N/t  Pain to move fingers  Seen in ER. XR done. Placed in splint  Sharp pain with movement  Taking oxycodone/tylenol/motrin PRN    RIGHT HAND AND WRIST  . INDICATION:   right hand pain following fall. Presents with swelling to the dorsum of right hand. COMPARISON:  None     VIEWS:  XR HAND 3+ VW RIGHT, XR WRIST 3+ VW RIGHT        For the purposes of institution wide universal language the following terms will apply: (thumb=1st digit/finger, index finger=2nd digit/finger, long finger=3rd digit/finger, ring=4th digit/finger and small finger=5th digit/finger)     FINDINGS:     Acute, nondisplaced fracture of the base of the fifth metacarpal.     No significant degenerative changes. No lytic or blastic osseous lesion. Soft tissues are unremarkable. IMPRESSION:     Acute, nondisplaced fracture of the base of the fifth metacarpal.     This report is concordant with LAN MERINO's preliminary interpretation that is documented in the electronic medical record (EPIC). Resident: Kate Huddleston, the attending radiologist, have reviewed the images and agree with the final report above.      Workstation performed: [id number]         Review of Systems Constitutional:  Negative for fatigue and fever. Respiratory:  Negative for shortness of breath. Cardiovascular:  Negative for chest pain. Gastrointestinal:  Positive for nausea. Negative for abdominal pain. Genitourinary:  Negative for dysuria. Musculoskeletal:  Positive for arthralgias. Skin:  Negative for rash and wound. Neurological:  Negative for weakness and headaches. Objective:  Vitals:  /91 (BP Location: Left arm, Patient Position: Sitting, Cuff Size: Standard)   Pulse 99   Ht 5' 8" (1.727 m)   Wt 99 kg (218 lb 3.2 oz)   BMI 33.18 kg/m²     The following portions of the patient's history were reviewed and updated as appropriate: allergies, current medications, past family history, past medical history, past social history, past surgical history, and problem list.    Physical exam:  Physical Exam  Constitutional:       Appearance: Normal appearance. She is normal weight. HENT:      Head: Normocephalic. Eyes:      Extraocular Movements: Extraocular movements intact. Pulmonary:      Effort: Pulmonary effort is normal.   Musculoskeletal:         General: Tenderness present. Cervical back: Normal range of motion. Comments: R 5th prox metacarpal TTP/swelling  NVI  No malrotation of 5th finger   Skin:     General: Skin is warm and dry. Neurological:      General: No focal deficit present. Mental Status: She is alert and oriented to person, place, and time. Mental status is at baseline. Psychiatric:         Mood and Affect: Mood normal.         Behavior: Behavior normal.         Thought Content: Thought content normal.         Judgment: Judgment normal.       Ortho Exam    I have personally reviewed pertinent films in PACS and my interpretation is XR-  R wrist- fx of base of 5th metacarpal., mild displacement.     Cast application    Date/Time: 11/20/2023 3:30 PM    Performed by: Mikhail Overton MD  Authorized by: Mikhail Overton MD  Universal Protocol:  Consent: Verbal consent obtained. Risks and benefits: risks, benefits and alternatives were discussed  Consent given by: patient  Timeout called at: 11/20/2023 3:37 PM.    Pre-procedure details:     Sensation:  Normal  Procedure details:     Laterality:  Right    Location:  Hand    Hand:  R hand    Splint type: EXO brace.       Norma Clarke MD

## 2024-02-21 PROBLEM — J20.8 ACUTE BRONCHITIS DUE TO OTHER SPECIFIED ORGANISMS: Status: RESOLVED | Noted: 2018-12-27 | Resolved: 2024-02-21

## 2024-02-21 PROBLEM — J18.9 COMMUNITY ACQUIRED PNEUMONIA: Status: RESOLVED | Noted: 2018-10-27 | Resolved: 2024-02-21

## 2024-02-21 PROBLEM — A41.9 SEPSIS (HCC): Status: RESOLVED | Noted: 2018-10-27 | Resolved: 2024-02-21

## 2024-08-09 ENCOUNTER — OFFICE VISIT (OUTPATIENT)
Dept: URGENT CARE | Facility: MEDICAL CENTER | Age: 39
End: 2024-08-09

## 2024-08-09 VITALS
SYSTOLIC BLOOD PRESSURE: 129 MMHG | OXYGEN SATURATION: 100 % | HEART RATE: 104 BPM | TEMPERATURE: 97.7 F | DIASTOLIC BLOOD PRESSURE: 81 MMHG

## 2024-08-09 DIAGNOSIS — S39.012A STRAIN OF LUMBAR REGION, INITIAL ENCOUNTER: Primary | ICD-10-CM

## 2024-08-09 PROCEDURE — 99212 OFFICE O/P EST SF 10 MIN: CPT | Performed by: PHYSICIAN ASSISTANT

## 2024-08-09 RX ORDER — METHYLPREDNISOLONE 4 MG/1
TABLET ORAL
Qty: 1 EACH | Refills: 0 | Status: SHIPPED | OUTPATIENT
Start: 2024-08-09

## 2024-08-09 RX ORDER — BACLOFEN 10 MG/1
10 TABLET ORAL 3 TIMES DAILY
Qty: 20 TABLET | Refills: 0 | Status: SHIPPED | OUTPATIENT
Start: 2024-08-09

## 2024-08-09 NOTE — LETTER
August 9, 2024     Patient: Speedy Green   YOB: 1985   Date of Visit: 8/9/2024       To Whom It May Concern:    It is my medical opinion that Speedy Green may return to work on 08/10/24 .    If you have any questions or concerns, please don't hesitate to call.         Sincerely,        Radha Goss PA-C    CC: No Recipients

## 2024-08-09 NOTE — PATIENT INSTRUCTIONS
Start Medrol Dose Pack  Take Baclofen  May also take Tylenol for pain  If symptoms worsen consider ER evaluation  If symptoms fail to improve follow up with PCP

## 2024-08-09 NOTE — PROGRESS NOTES
[facility]        NAME: Speedy Green is a 39 y.o. female  : 1985    MRN: [id number]  DATE: 2024  TIME: 4:22 PM    Assessment and Plan   Strain of lumbar region, initial encounter [S39.012A]  1. Strain of lumbar region, initial encounter  methylPREDNISolone 4 MG tablet therapy pack    baclofen 10 mg tablet            Patient Instructions       Follow up with PCP in 3-5 days.  Proceed to  ER if symptoms worsen.    If tests have been performed at [facility], our office will contact you with results if changes need to be made to the care plan discussed with you at the visit.  You can review your full results on [facility].    Chief Complaint     Chief Complaint   Patient presents with   • Back Pain     Lower back pain since 24 @ work          History of Present Illness       Patient presents with a 3-day history of low back pain which radiates into her buttocks.  No known injury.  She has a previous history of low back issues where she had injections from pain management.  She stopped following with pain management when she became pregnant.  Patient denies saddle anesthesia or lower extremity muscle weakness or paresthesia.  Patient missed work the past 2 days and needs a note to return.        Review of Systems   Review of Systems   Constitutional:  Negative for fever.   Genitourinary:  Negative for difficulty urinating.   Musculoskeletal:  Positive for back pain.   Skin:  Negative for rash.   Neurological:  Negative for weakness and numbness.         Current Medications       Current Outpatient Medications:   •  baclofen 10 mg tablet, Take 1 tablet (10 mg total) by mouth 3 (three) times a day, Disp: 20 tablet, Rfl: 0  •  methylPREDNISolone 4 MG tablet therapy pack, Use as directed on package, Disp: 1 each, Rfl: 0  •  acetaminophen (TYLENOL) 500 mg tablet, Take 1 tablet (500 mg total) by mouth every 6 (six) hours as needed for mild pain (Patient not taking: Reported on  6/15/2023), Disp: 30 tablet, Rfl: 0  •  albuterol (PROVENTIL HFA,VENTOLIN HFA) 90 mcg/act inhaler, Inhale 2 puffs every 4 (four) hours as needed for wheezing (or cough) (Patient not taking: Reported on 7/2/2020), Disp: 1 Inhaler, Rfl: 1  •  butalbital-acetaminophen-caffeine (FIORICET,ESGIC) -40 mg per tablet, Take 1 tablet by mouth every 4 (four) hours as needed for headaches (Patient not taking: Reported on 1/11/2023), Disp: , Rfl:   •  cyanocobalamin (VITAMIN B-12) 100 mcg tablet, Take by mouth daily (Patient not taking: Reported on 6/15/2023), Disp: , Rfl:   •  diazepam (VALIUM) 10 mg tablet, TAKE 1-2 TABLETS BY MOUTH 1-2 HOURS BEFORE PROCEDURE (Patient not taking: Reported on 1/11/2023), Disp: , Rfl: 0  •  ergocalciferol (VITAMIN D2) 50,000 units, Take 50,000 Units by mouth once a week Every Wednesday (Patient not taking: Reported on 1/11/2023), Disp: , Rfl: 0  •  fluticasone (FLONASE) 50 mcg/act nasal spray, 1 spray into each nostril daily for 7 days (Patient not taking: Reported on 11/20/2023), Disp: 1 Bottle, Rfl: 0  •  gabapentin (NEURONTIN) 300 mg capsule, TAKE 1 CAPSULE BY MOUTH 3 TO 4 TIMES A DAY (Patient not taking: Reported on 1/11/2023), Disp: , Rfl: 0  •  insulin glargine (LANTUS) 100 units/mL subcutaneous injection, Inject 32 Units under the skin every 12 (twelve) hours (Patient not taking: Reported on 6/15/2023), Disp: , Rfl:   •  insulin lispro (HumaLOG) 100 units/mL injection, Inject 12 Units under the skin 3 (three) times a day with meals (Patient not taking: Reported on 6/15/2023), Disp: , Rfl:   •  levonorgestrel (MIRENA) 20 MCG/24HR IUD, 1 each by Intrauterine route once (Patient not taking: Reported on 1/11/2023), Disp: , Rfl:   •  magnesium 30 MG tablet, Take 30 mg by mouth 2 (two) times a day (Patient not taking: Reported on 6/15/2023), Disp: , Rfl:   •  multivitamin (THERAGRAN) TABS, Take 1 tablet by mouth daily (Patient not taking: Reported on 1/11/2023), Disp: , Rfl:   •   ondansetron (ZOFRAN) 4 mg tablet, Take 1 tablet (4 mg total) by mouth every 8 (eight) hours as needed for nausea or vomiting (Patient not taking: Reported on 2023), Disp: 12 tablet, Rfl: 0  •  oxyCODONE-acetaminophen (PERCOCET)  mg per tablet, take 1 tablet by mouth every 6 hours if needed severe pain (Patient not taking: Reported on 2023), Disp: , Rfl: 0  •  SUMAtriptan (IMITREX) 100 mg tablet, as needed (Patient not taking: Reported on 2022), Disp: , Rfl:   •  topiramate (TOPAMAX) 25 mg tablet, Take 25 mg by mouth 2 (two) times a day (Patient not taking: Reported on 2023), Disp: , Rfl:     Current Allergies     Allergies as of 2024   • (No Known Allergies)            The following portions of the patient's history were reviewed and updated as appropriate: allergies, current medications, past family history, past medical history, past social history, past surgical history and problem list.     Past Medical History:   Diagnosis Date   • Anxiety    • Arachnoid cyst    • Chronic pain     back   • Depression    • Diabetes mellitus (HCC)    • Hypertension    • Migraine    • Mitral valve prolapse    • Psychiatric disorder     Anxiety and Depression       Past Surgical History:   Procedure Laterality Date   •  SECTION     • CHOLECYSTECTOMY     • CRANIOTOMY Right 3/1/2017    Procedure: RIGHT IMAGE GUIDED RETROMASTOID CRANIOTOMY FOR FENESTRATION AND PLACEMENT OF INTERNAL CYSTO-SUBARACHNOID SHUNT;  Surgeon: Samuel Del Rosario MD;  Location: [facility];  Service:    • MOUTH SURGERY     • WISDOM TOOTH EXTRACTION         Family History   Problem Relation Age of Onset   • Rheum arthritis Mother    • Hypertension Mother    • Breast cancer Mother    • Polycystic ovary syndrome Mother    • No Known Problems Brother    • Asthma Son    • Diabetes Maternal Grandmother    • Thyroid disease Maternal Grandmother    • Stroke Maternal Grandmother    • Hypertension Maternal Grandmother    • Rheum  arthritis Maternal Grandfather    • Lung cancer Maternal Grandfather    • Hypertension Maternal Grandfather    • COPD Maternal Grandfather    • Asthma Son    • Asthma Son    • Ear Disease Son          Medications have been verified.        Objective   /81   Pulse 104   Temp 97.7 °F (36.5 °C)   SpO2 100%   No LMP recorded.       Physical Exam     Physical Exam  Vitals and nursing note reviewed.   Constitutional:       Appearance: Normal appearance.   HENT:      Head: Normocephalic and atraumatic.   Cardiovascular:      Rate and Rhythm: Normal rate.   Pulmonary:      Effort: Pulmonary effort is normal.   Musculoskeletal:      Comments: No midline lumbar vertebral tenderness on palpation.  Mild tenderness right lumbar paraspinal muscles.  No palpable spasm.  Diminished range of motion in all planes secondary to pain.  Straight leg raise negative bilaterally.  Lower extremity reflexes 2+ throughout.   Skin:     General: Skin is warm.   Neurological:      Mental Status: She is alert.

## 2024-10-13 ENCOUNTER — OFFICE VISIT (OUTPATIENT)
Dept: URGENT CARE | Facility: MEDICAL CENTER | Age: 39
End: 2024-10-13

## 2024-10-13 VITALS
DIASTOLIC BLOOD PRESSURE: 80 MMHG | HEIGHT: 65 IN | WEIGHT: 231 LBS | TEMPERATURE: 98.5 F | RESPIRATION RATE: 20 BRPM | HEART RATE: 100 BPM | OXYGEN SATURATION: 99 % | BODY MASS INDEX: 38.49 KG/M2 | SYSTOLIC BLOOD PRESSURE: 118 MMHG

## 2024-10-13 DIAGNOSIS — R10.32 LEFT LOWER QUADRANT ABDOMINAL PAIN: Primary | ICD-10-CM

## 2024-10-13 DIAGNOSIS — N92.6 ABNORMAL MENSES: ICD-10-CM

## 2024-10-13 LAB
SL AMB  POCT GLUCOSE, UA: NORMAL
SL AMB LEUKOCYTE ESTERASE,UA: NORMAL
SL AMB POCT BILIRUBIN,UA: NORMAL
SL AMB POCT BLOOD,UA: NORMAL
SL AMB POCT CLARITY,UA: CLEAR
SL AMB POCT COLOR,UA: YELLOW
SL AMB POCT KETONES,UA: NORMAL
SL AMB POCT NITRITE,UA: NORMAL
SL AMB POCT PH,UA: 6
SL AMB POCT SPECIFIC GRAVITY,UA: 1.01
SL AMB POCT URINE HCG: NEGATIVE
SL AMB POCT URINE PROTEIN: NORMAL
SL AMB POCT UROBILINOGEN: 0.2

## 2024-10-13 PROCEDURE — 99214 OFFICE O/P EST MOD 30 MIN: CPT

## 2024-10-13 PROCEDURE — 81025 URINE PREGNANCY TEST: CPT

## 2024-10-13 PROCEDURE — 81002 URINALYSIS NONAUTO W/O SCOPE: CPT

## 2024-10-13 NOTE — PROGRESS NOTES
[facility]        NAME: Speedy Green is a 39 y.o. female  : 1985    MRN: [id number]  DATE: 2024  TIME: 11:20 AM    Assessment and Plan   Left lower quadrant abdominal pain [R10.32]  1. Left lower quadrant abdominal pain  POCT urine dip    POCT urine HCG    Transfer to other facility      2. Abnormal menses              Patient Instructions       Follow up with PCP in 3-5 days.  Proceed to  ER if symptoms worsen.    If tests are performed, our office will contact you with results only if changes need to made to the care plan discussed with you at the visit. You can review your full results on [facility].    Chief Complaint     Chief Complaint   Patient presents with    Abdominal Pain     Patient had period 3 weeks ago  stopped 5 days and then had 2nd period 5 days later with abnormal bleeding. For 2 days patient has had left lower distal abdominal pain.         History of Present Illness       Patient here with reprots of having x3 weeks ago had normal menses (lasted x5 days). Stopped bleeding for 5 days and then started with bleeding again x7 days with excessively heavy bleeding with large clots. She has had a prior b/l salpingectomy (2023) secondary to unexpected pregnancy and related complications of pre-eclampsia delivering at 37w. She has had left lower quadrant pain which is constant which started about 3x days ago without improvement. Denies any urinary symptoms. Last BM was last night and was normal for her. She has not had any noted fever or chills. She has been taking ibuprofen- last dose was last night. She is sexually active and denies any vaginal discharge.       Discussed evaluation in the ER. Patient willing- would like to go to [facility] d/t location, her son is able to take her POV.         Review of Systems   Review of Systems   Constitutional:  Negative for appetite change, chills, fatigue and fever.        Has felt feverish but has been taking  ibuprofen for pain and has not measured temp.    HENT:  Negative for ear pain and sore throat.    Respiratory:  Negative for cough and shortness of breath.    Cardiovascular:  Negative for chest pain and palpitations.   Gastrointestinal:  Positive for abdominal pain. Negative for constipation, diarrhea, nausea and vomiting.   Genitourinary:  Positive for pelvic pain. Negative for decreased urine volume, dysuria, flank pain, frequency, hematuria, urgency, vaginal bleeding and vaginal discharge.   Musculoskeletal:  Negative for arthralgias and back pain.   Skin:  Negative for color change and rash.   Neurological:  Negative for dizziness, light-headedness and headaches.   All other systems reviewed and are negative.        Current Medications       Current Outpatient Medications:     acetaminophen (TYLENOL) 500 mg tablet, Take 1 tablet (500 mg total) by mouth every 6 (six) hours as needed for mild pain (Patient not taking: Reported on 6/15/2023), Disp: 30 tablet, Rfl: 0    albuterol (PROVENTIL HFA,VENTOLIN HFA) 90 mcg/act inhaler, Inhale 2 puffs every 4 (four) hours as needed for wheezing (or cough) (Patient not taking: Reported on 7/2/2020), Disp: 1 Inhaler, Rfl: 1    baclofen 10 mg tablet, Take 1 tablet (10 mg total) by mouth 3 (three) times a day, Disp: 20 tablet, Rfl: 0    butalbital-acetaminophen-caffeine (FIORICET,ESGIC) -40 mg per tablet, Take 1 tablet by mouth every 4 (four) hours as needed for headaches (Patient not taking: Reported on 1/11/2023), Disp: , Rfl:     cyanocobalamin (VITAMIN B-12) 100 mcg tablet, Take by mouth daily (Patient not taking: Reported on 6/15/2023), Disp: , Rfl:     diazepam (VALIUM) 10 mg tablet, TAKE 1-2 TABLETS BY MOUTH 1-2 HOURS BEFORE PROCEDURE (Patient not taking: Reported on 1/11/2023), Disp: , Rfl: 0    ergocalciferol (VITAMIN D2) 50,000 units, Take 50,000 Units by mouth once a week Every Wednesday (Patient not taking: Reported on 1/11/2023), Disp: , Rfl: 0    fluticasone  (FLONASE) 50 mcg/act nasal spray, 1 spray into each nostril daily for 7 days (Patient not taking: Reported on 11/20/2023), Disp: 1 Bottle, Rfl: 0    gabapentin (NEURONTIN) 300 mg capsule, TAKE 1 CAPSULE BY MOUTH 3 TO 4 TIMES A DAY (Patient not taking: Reported on 1/11/2023), Disp: , Rfl: 0    insulin glargine (LANTUS) 100 units/mL subcutaneous injection, Inject 32 Units under the skin every 12 (twelve) hours (Patient not taking: Reported on 6/15/2023), Disp: , Rfl:     insulin lispro (HumaLOG) 100 units/mL injection, Inject 12 Units under the skin 3 (three) times a day with meals (Patient not taking: Reported on 6/15/2023), Disp: , Rfl:     levonorgestrel (MIRENA) 20 MCG/24HR IUD, 1 each by Intrauterine route once (Patient not taking: Reported on 1/11/2023), Disp: , Rfl:     magnesium 30 MG tablet, Take 30 mg by mouth 2 (two) times a day (Patient not taking: Reported on 6/15/2023), Disp: , Rfl:     methylPREDNISolone 4 MG tablet therapy pack, Use as directed on package, Disp: 1 each, Rfl: 0    multivitamin (THERAGRAN) TABS, Take 1 tablet by mouth daily (Patient not taking: Reported on 1/11/2023), Disp: , Rfl:     ondansetron (ZOFRAN) 4 mg tablet, Take 1 tablet (4 mg total) by mouth every 8 (eight) hours as needed for nausea or vomiting (Patient not taking: Reported on 1/11/2023), Disp: 12 tablet, Rfl: 0    oxyCODONE-acetaminophen (PERCOCET)  mg per tablet, take 1 tablet by mouth every 6 hours if needed severe pain (Patient not taking: Reported on 1/11/2023), Disp: , Rfl: 0    SUMAtriptan (IMITREX) 100 mg tablet, as needed (Patient not taking: Reported on 12/27/2022), Disp: , Rfl:     topiramate (TOPAMAX) 25 mg tablet, Take 25 mg by mouth 2 (two) times a day (Patient not taking: Reported on 1/11/2023), Disp: , Rfl:     Current Allergies     Allergies as of 10/13/2024    (No Known Allergies)            The following portions of the patient's history were reviewed and updated as appropriate: allergies, current  "medications, past family history, past medical history, past social history, past surgical history and problem list.     Past Medical History:   Diagnosis Date    Anxiety     Arachnoid cyst     Chronic pain     back    Depression     Diabetes mellitus (HCC)     Hypertension     Migraine     Mitral valve prolapse     Psychiatric disorder     Anxiety and Depression       Past Surgical History:   Procedure Laterality Date     SECTION      CHOLECYSTECTOMY      CRANIOTOMY Right 3/1/2017    Procedure: RIGHT IMAGE GUIDED RETROMASTOID CRANIOTOMY FOR FENESTRATION AND PLACEMENT OF INTERNAL CYSTO-SUBARACHNOID SHUNT;  Surgeon: Samuel Del Rosario MD;  Location: [facility];  Service:     MOUTH SURGERY      WISDOM TOOTH EXTRACTION         Family History   Problem Relation Age of Onset    Rheum arthritis Mother     Hypertension Mother     Breast cancer Mother     Polycystic ovary syndrome Mother     No Known Problems Brother     Asthma Son     Diabetes Maternal Grandmother     Thyroid disease Maternal Grandmother     Stroke Maternal Grandmother     Hypertension Maternal Grandmother     Rheum arthritis Maternal Grandfather     Lung cancer Maternal Grandfather     Hypertension Maternal Grandfather     COPD Maternal Grandfather     Asthma Son     Asthma Son     Ear Disease Son          Medications have been verified.        Objective   /80   Pulse 100   Temp 98.5 °F (36.9 °C)   Resp 20   Ht 5' 5\" (1.651 m)   Wt 105 kg (231 lb)   SpO2 99%   BMI 38.44 kg/m²        Physical Exam     Physical Exam  Vitals and nursing note reviewed.   Constitutional:       General: She is in acute distress.      Appearance: Normal appearance. She is obese. She is ill-appearing.      Comments: Appears very uncomfortable.    HENT:      Head: Normocephalic and atraumatic.      Nose: Nose normal.      Mouth/Throat:      Lips: Pink.      Mouth: Mucous membranes are moist.      Pharynx: Oropharynx is clear.   Eyes:      Extraocular Movements: " Extraocular movements intact.      Conjunctiva/sclera: Conjunctivae normal.      Pupils: Pupils are equal, round, and reactive to light.   Cardiovascular:      Rate and Rhythm: Normal rate and regular rhythm.      Pulses: Normal pulses.      Heart sounds: Normal heart sounds.   Pulmonary:      Effort: Pulmonary effort is normal.      Breath sounds: Normal breath sounds.   Abdominal:      General: Abdomen is flat. Bowel sounds are normal.      Palpations: Abdomen is soft.      Tenderness: There is abdominal tenderness in the suprapubic area and left lower quadrant. There is guarding. There is no right CVA tenderness, left CVA tenderness or rebound.      Comments: Pain in abdomen with movement and changing position, Tender to light palpation.    Musculoskeletal:         General: Normal range of motion.      Cervical back: Full passive range of motion without pain, normal range of motion and neck supple.   Skin:     General: Skin is warm.      Capillary Refill: Capillary refill takes less than 2 seconds.      Findings: No rash.   Neurological:      General: No focal deficit present.      Mental Status: She is alert and oriented to person, place, and time.   Psychiatric:         Mood and Affect: Mood normal.         Behavior: Behavior normal.

## 2025-04-25 ENCOUNTER — OFFICE VISIT (OUTPATIENT)
Dept: FAMILY MEDICINE CLINIC | Facility: CLINIC | Age: 40
End: 2025-04-25
Payer: COMMERCIAL

## 2025-04-25 VITALS
HEART RATE: 116 BPM | WEIGHT: 237.4 LBS | SYSTOLIC BLOOD PRESSURE: 130 MMHG | TEMPERATURE: 98.4 F | HEIGHT: 66 IN | BODY MASS INDEX: 38.15 KG/M2 | DIASTOLIC BLOOD PRESSURE: 84 MMHG | OXYGEN SATURATION: 98 %

## 2025-04-25 DIAGNOSIS — Z79.4 TYPE 2 DIABETES MELLITUS WITH HYPERGLYCEMIA, WITH LONG-TERM CURRENT USE OF INSULIN (HCC): Primary | ICD-10-CM

## 2025-04-25 DIAGNOSIS — G89.4 CHRONIC PAIN DISORDER: ICD-10-CM

## 2025-04-25 DIAGNOSIS — F41.8 DEPRESSION WITH ANXIETY: ICD-10-CM

## 2025-04-25 DIAGNOSIS — M47.22 OSTEOARTHRITIS OF SPINE WITH RADICULOPATHY, CERVICAL REGION: ICD-10-CM

## 2025-04-25 DIAGNOSIS — R05.9 COUGH IN ADULT: ICD-10-CM

## 2025-04-25 DIAGNOSIS — J22 LOWER RESPIRATORY TRACT INFECTION: ICD-10-CM

## 2025-04-25 DIAGNOSIS — M54.40 LOW BACK PAIN WITH SCIATICA, SCIATICA LATERALITY UNSPECIFIED, UNSPECIFIED BACK PAIN LATERALITY, UNSPECIFIED CHRONICITY: ICD-10-CM

## 2025-04-25 DIAGNOSIS — G43.809 OTHER MIGRAINE WITHOUT STATUS MIGRAINOSUS, NOT INTRACTABLE: ICD-10-CM

## 2025-04-25 DIAGNOSIS — Z72.0 TOBACCO USE: ICD-10-CM

## 2025-04-25 DIAGNOSIS — E11.65 TYPE 2 DIABETES MELLITUS WITH HYPERGLYCEMIA, WITH LONG-TERM CURRENT USE OF INSULIN (HCC): Primary | ICD-10-CM

## 2025-04-25 DIAGNOSIS — Z12.4 SCREENING FOR CERVICAL CANCER: ICD-10-CM

## 2025-04-25 DIAGNOSIS — J45.20 MILD INTERMITTENT ASTHMA WITHOUT COMPLICATION: ICD-10-CM

## 2025-04-25 DIAGNOSIS — Z87.19 HISTORY OF DIVERTICULITIS: ICD-10-CM

## 2025-04-25 DIAGNOSIS — J30.2 SEASONAL ALLERGIES: ICD-10-CM

## 2025-04-25 DIAGNOSIS — E66.9 OBESITY (BMI 30-39.9): ICD-10-CM

## 2025-04-25 DIAGNOSIS — Z12.31 ENCOUNTER FOR SCREENING MAMMOGRAM FOR BREAST CANCER: ICD-10-CM

## 2025-04-25 PROBLEM — G43.909 MIGRAINE: Status: ACTIVE | Noted: 2025-04-25

## 2025-04-25 PROCEDURE — 99203 OFFICE O/P NEW LOW 30 MIN: CPT | Performed by: FAMILY MEDICINE

## 2025-04-25 RX ORDER — ALBUTEROL SULFATE 90 UG/1
2 INHALANT RESPIRATORY (INHALATION) EVERY 4 HOURS PRN
Qty: 6.7 G | Refills: 2 | Status: SHIPPED | OUTPATIENT
Start: 2025-04-25

## 2025-04-25 NOTE — PROGRESS NOTES
Name: Speedy Green      : 1985      MRN: [id number]  Encounter Provider: Alexx Barriga DO  Encounter Date: 2025   Encounter department: [facility]  :  Assessment & Plan  Type 2 diabetes mellitus with hyperglycemia, with long-term current use of insulin ([facility])    Lab Results   Component Value Date    HGBA1C 7.3 (H) 2023   Patient also with a history of gestational diabetes.  Patient has been on metformin in the past.  This was stopped while she was pregnant and switched to insulin.         Other migraine without status migrainosus, not intractable  Currently stable.  Uses Excedrin Migraine if needed.       Depression with anxiety  This is by history.  Patient no longer has those symptoms.         Chronic pain disorder  This is secondary to cervical spine pain and lumbar spine pain which is chronic.  Patient has seen pain management in the past in [place].  Patient has been on medications as well as had therapeutic injections.  Patient has had imaging done in the past.  Will update x-rays.  Will consider referral to pain medicine and discuss this at the follow-up visit.    Patient was on gabapentin at 1 time for this.       Tobacco use  Discussed cessation.  Readdress at each visit       Encounter for screening mammogram for breast cancer    Orders:    Mammo screening bilateral w 3d and cad; Future    Screening for cervical cancer    Orders:    Ambulatory referral to Obstetrics / Gynecology; Future    History of diverticulitis  This is by history.  Patient did have an emergency room visit approximately 2024.  Patient states she has a history of diverticulitis and has had a colonoscopy in the past.  Will need to readdress this as well.  This study was done while living in [place].  I have no records.              History of Present Illness   Patient is a pleasant 40-year-old female who presents today to [facility] care.  This is the first visit at this clinic.  Her  "medical history was obtained from the patient as well as review of the epic chart.      Review of Systems   Constitutional:  Negative for chills and fever.   HENT:  Negative for ear pain and sore throat.    Eyes:  Negative for pain and visual disturbance.   Respiratory:  Negative for cough and shortness of breath.    Cardiovascular:  Negative for chest pain and palpitations.   Gastrointestinal:  Negative for abdominal pain and vomiting.   Genitourinary:  Negative for dysuria and hematuria.   Musculoskeletal:  Negative for arthralgias and back pain.   Skin:  Negative for color change and rash.   Neurological:  Negative for seizures and syncope.   All other systems reviewed and are negative.      Objective   /84   Pulse (!) 116   Temp 98.4 °F (36.9 °C)   Ht 5' 5.75\" (1.67 m)   Wt 108 kg (237 lb 6.4 oz)   SpO2 98%   BMI 38.61 kg/m²      Physical Exam  Vitals and nursing note reviewed.   Constitutional:       General: She is not in acute distress.     Appearance: Normal appearance. She is well-developed. She is not ill-appearing, toxic-appearing or diaphoretic.   HENT:      Head: Normocephalic and atraumatic.      Mouth/Throat:      Mouth: Mucous membranes are moist.   Eyes:      Conjunctiva/sclera: Conjunctivae normal.      Pupils: Pupils are equal, round, and reactive to light.   Cardiovascular:      Rate and Rhythm: Normal rate and regular rhythm.      Pulses: Normal pulses.      Heart sounds: Normal heart sounds. No murmur heard.  Pulmonary:      Effort: Pulmonary effort is normal. No respiratory distress.      Breath sounds: Normal breath sounds.   Abdominal:      General: Bowel sounds are normal.      Palpations: Abdomen is soft.      Tenderness: There is no abdominal tenderness.   Musculoskeletal:         General: No swelling.      Cervical back: Neck supple.   Skin:     General: Skin is warm and dry.      Capillary Refill: Capillary refill takes less than 2 seconds.   Neurological:      General: No " focal deficit present.      Mental Status: She is alert and oriented to person, place, and time.   Psychiatric:         Mood and Affect: Mood normal.         Behavior: Behavior normal.         Thought Content: Thought content normal.

## 2025-04-25 NOTE — ASSESSMENT & PLAN NOTE
This is secondary to cervical spine pain and lumbar spine pain which is chronic.  Patient has seen pain management in the past in [place].  Patient has been on medications as well as had therapeutic injections.  Patient has had imaging done in the past.  Will update x-rays.  Will consider referral to pain medicine and discuss this at the follow-up visit.    Patient was on gabapentin at 1 time for this.

## 2025-04-25 NOTE — ASSESSMENT & PLAN NOTE
This is by history.  Patient did have an emergency room visit approximately October 2024.  Patient states she has a history of diverticulitis and has had a colonoscopy in the past.  Will need to readdress this as well.  This study was done while living in [place].  I have no records.

## 2025-04-25 NOTE — ASSESSMENT & PLAN NOTE
Lab Results   Component Value Date    HGBA1C 7.3 (H) 04/13/2023   Patient also with a history of gestational diabetes.  Patient has been on metformin in the past.  This was stopped while she was pregnant and switched to insulin.

## 2025-05-02 ENCOUNTER — OFFICE VISIT (OUTPATIENT)
Dept: BARIATRICS | Facility: CLINIC | Age: 40
End: 2025-05-02
Payer: COMMERCIAL

## 2025-05-02 VITALS
HEIGHT: 65 IN | HEART RATE: 100 BPM | SYSTOLIC BLOOD PRESSURE: 128 MMHG | BODY MASS INDEX: 38.65 KG/M2 | DIASTOLIC BLOOD PRESSURE: 78 MMHG | TEMPERATURE: 97.8 F | OXYGEN SATURATION: 97 % | RESPIRATION RATE: 16 BRPM | WEIGHT: 232 LBS

## 2025-05-02 DIAGNOSIS — Z79.4 TYPE 2 DIABETES MELLITUS WITH HYPERGLYCEMIA, WITH LONG-TERM CURRENT USE OF INSULIN (HCC): ICD-10-CM

## 2025-05-02 DIAGNOSIS — E11.65 TYPE 2 DIABETES MELLITUS WITH HYPERGLYCEMIA, WITH LONG-TERM CURRENT USE OF INSULIN (HCC): ICD-10-CM

## 2025-05-02 DIAGNOSIS — E66.9 OBESITY (BMI 30-39.9): Primary | ICD-10-CM

## 2025-05-02 DIAGNOSIS — J45.20 MILD INTERMITTENT ASTHMA WITHOUT COMPLICATION: ICD-10-CM

## 2025-05-02 PROCEDURE — 99244 OFF/OP CNSLTJ NEW/EST MOD 40: CPT | Performed by: PHYSICIAN ASSISTANT

## 2025-05-02 RX ORDER — TIRZEPATIDE 2.5 MG/.5ML
2.5 INJECTION, SOLUTION SUBCUTANEOUS WEEKLY
Qty: 2 ML | Refills: 0 | Status: SHIPPED | OUTPATIENT
Start: 2025-05-02

## 2025-05-02 NOTE — ASSESSMENT & PLAN NOTE
- Start Mounjaro weekly  - Sent to [facility]  Lab Results   Component Value Date    HGBA1C 7.3 (H) 04/13/2023

## 2025-05-02 NOTE — PROGRESS NOTES
Assessment/Plan:    Speedy Green  is a 40 y.o. female with excess weight/obesity here to pursue weight managment.      Obesity (BMI 30-39.9)  -- Discussed options of HealthyCORE-Intensive Lifestyle Intervention Program, Very Low Calorie Diet-VLCD, Conservative Program, Alvarez-En-Y Gastric Bypass, and Vertical Sleeve Gastrectomy and the role of weight loss medications.  - Initial weight loss goal of 5-10% weight loss for improved health  - Patient is interested in pursuing Conservative Program  - Screening labs: Diabetic, updated labs already in system  - Calorie goal: 0452-3355 calories per day  - Hx diverticulitis and chrohns, monitor  - Start Mounjaro 2.5mg weekly  - Medication agreement signed: Yes  - Follow up in 4 months  - Dietician: Agreeable, make appt for meal planning in 2-3 weeks        Type 2 diabetes mellitus with hyperglycemia, with long-term current use of insulin (HCC)  - Start Mounjaro weekly  - Sent to [facility]  Lab Results   Component Value Date    HGBA1C 7.3 (H) 04/13/2023       Mild intermittent asthma without complication  - Uses inhaler PRN    Go to [email or web address] for further information. Instructions on how to inject yourself are located on the website.    - Zebound was sent to your pharmacy. The prior authorization process will been done through our prior authorization team and can take up to 3-4 weeks to process through the insurance.     - Start Zepbound 2.5 mg subcutaneously weekly. After you have taken the second pen, please give me an update, as we will likely increase the dose the next month if you are tolerating it well.     - Side effects of Zepbound include nausea, vomiting, diarrhea, or constipation. Keep an eye on your heart rate while on Zepbound. If you resting heart rate is greater than 100 beats per minutes, please notify me. Please eat small frequent meals to help reduce nausea. Lemon water and saltine crackers may help with this. If you experience fever,  nausea/vomiting, and pain radiating to your back this may be a sign of pancreatitis. Please have ED evaluation if this occurs.     - Please hold Zepbound for one week prior to a procedure such as surgery, upper endoscopy, or colonoscopy for risk of aspiration.     - Zepbound can reduce the effectiveness of oral hormonal birth control (birth control pills). Recommend a barrier backup method such as condoms to prevent pregnancy.       Goals/General Recommendations:     Food log (ie.) www.[employer].com,sparkpeople.com, loseit.com, Withings.com, etc.   Practice mindful eating.  Be sure to set aside time to eat, eat slowly, and savor your food.  Do NOT drink your calories and aim for AT LEAST 64oz of water daily. No sugar sweetened beverages.  No juice (eat the fruit instead).  Eat breakfast daily.  Do not skip meals.    Exercise:   Increase physical activity by 10 minutes daily. Gradually increase physical activity to a goal of 5 days per week for 30 minutes of MODERATE intensity PLUS 2 days per week of FULL BODY resistance training. Resistance training can increase muscle mass and increase our resting metabolic rate.   FULL BODY resistance training is recommended 2-3 times a week.  Do not do this on consecutive days to allow for muscle recovery.  Aim for a bare minimum 8-10,000 steps, even on days you do not exercise.     Monitoring:   Weigh yourself daily.  If this causes undue stress, then just weigh yourself once a week.  Weigh yourself the same time of the day with the same amount of clothing on.  Preferably this should be done after waking up, before you eat, and with no clothing or minimal clothing on.      Diagnoses and all orders for this visit:    Obesity (BMI 30-39.9)  -     Ambulatory Referral to Weight Management  -     Tirzepatide (Mounjaro) 2.5 MG/0.5ML SOAJ; Inject 2.5 mg under the skin once a week    Type 2 diabetes mellitus with hyperglycemia, with long-term current use of insulin (HCC)  -      Tirzepatide (Mounjaro) 2.5 MG/0.5ML SOAJ; Inject 2.5 mg under the skin once a week    Mild intermittent asthma without complication  -     Tirzepatide (Mounjaro) 2.5 MG/0.5ML SOAJ; Inject 2.5 mg under the skin once a week        Food log (ie.) www.[employer].com,sparkpeople.com,loseit.com,calorieking.com,etc.   No sugary beverages. At least 64oz of water daily.  Goal protein intake of 60-80 grams per day  25-35 grams of dietary fiber per day  9583-3506 calories    Follow up in approximately  4 months  with Surgical Advanced Practitioner.    - Discussed options of HealthyCORE-Intensive Lifestyle Intervention Program, Very Low Calorie Diet-VLCD, Conservative Program, Alvarez-En-Y Gastric Bypass, and Vertical Sleeve Gastrectomy and the role of weight loss medications.  - Explained the importance of making lifestyle changes first before starting anti-obesity medications.  - Patient is interested in pursuing Conservative Program  - Initial weight loss goal of 5-10% weight loss for improved health as studies have shown this is where we see the greatest impact on improving health and decreasing risk of obesity related conditions.  - Weight loss can improve patient's co-morbid conditions and/or prevent weight-related complications.  - Stop Bang 2/8  - Labs reviewed: Already ordered in system, last A1C can find 7.3 2023      Subjective:     Patient ID: Speedy Green  is a 40 y.o. female with excess weight/obesity here to pursue weight managment.    Past Medical History:   Diagnosis Date    Anxiety     Arachnoid cyst     Chronic pain     back    Depression     Diabetes mellitus (HCC)     Hypertension     Migraine     Mitral valve prolapse     Psychiatric disorder     Anxiety and Depression       HPI:    Severity: Severe  Onset:  Late teens    Highest weight: 280  Lowest Weight: 189  Current weight: 237  Goal weight: 155  What has been tried: Diet and Exercise and Commercial Weight Loss Programs-ie. Weight Watchers, Alannah  Junior, Nutrisystem, etc.  Contributing factors: Poor Food Choices and Insufficient Physical Activity  Associated symptoms and effects: fatigue, increased joint pain, and decreased mobility   5% weight loss = 12  20% weight loss = 46    Wt Readings from Last 20 Encounters:   25 105 kg (232 lb)   25 108 kg (237 lb 6.4 oz)   10/13/24 105 kg (231 lb)   23 99 kg (218 lb 3.2 oz)   23 99.8 kg (220 lb)   06/15/23 99.8 kg (220 lb)   23 109 kg (239 lb 6.4 oz)   22 110 kg (242 lb 3.2 oz)   10/05/22 97.5 kg (215 lb)   20 113 kg (248 lb 0.3 oz)   20 110 kg (243 lb 2.7 oz)   20 114 kg (251 lb 15.8 oz)   20 114 kg (251 lb 8.7 oz)   10/07/19 117 kg (257 lb 0.9 oz)   19 118 kg (260 lb)   19 112 kg (246 lb 14.6 oz)   02/10/19 112 kg (247 lb 2.2 oz)   18 111 kg (244 lb 4.3 oz)   10/27/18 108 kg (238 lb 15.7 oz)   10/24/18 114 kg (251 lb 1.7 oz)        Food logging:  B: Nutrigrain bar + occasional coffee + cream  S: Skips  L: Pack of crackers or cheese-its  S: Skips  D: Meat + salad + strawberry vinaigrette   S: Skips     Dining out: Maybe once weekly  Hydration: 120oz, one daily energy drink with zero sugar, occasional regular soda   Alcohol: Once monthly   Smokin/2 pack per day  Exercise: Walks at work  Weight Monitoring: Checks occasionally   Sleep: 6 hours   Occupation: Works for [employer]  Contraception: Tubal   Colonoscopy: N/A    Patient denies personal and family history of pancreatitis, thyroid cancer, MEN-2 tumors.  Denies any hx of glaucoma, seizures, kidney stones, gallstones. Hx of cholecystectomy  Denies Hx of CAD, PAD, palpitations, arrhythmia. Hx of mitral valve prolapse  Denies uncontrolled anxiety or depression, suicidal behavior or thinking, insomnia or sleep disturbance.       The following portions of the patient's history were reviewed and updated as appropriate: allergies, current medications, past family history, past  "medical history, past social history, past surgical history, and problem list.    Review of Systems   Constitutional:  Negative for chills and fever.   HENT:  Negative for ear pain and sore throat.    Eyes:  Negative for pain and visual disturbance.   Respiratory:  Negative for cough and shortness of breath.    Cardiovascular:  Negative for chest pain and palpitations.   Gastrointestinal:  Negative for abdominal pain and vomiting.   Genitourinary:  Negative for dysuria and hematuria.   Musculoskeletal:  Negative for arthralgias and back pain.   Skin:  Negative for color change and rash.   Neurological:  Negative for seizures and syncope.   All other systems reviewed and are negative.      Objective:    /78 (BP Location: Right arm, Patient Position: Sitting, Cuff Size: Large)   Pulse 100   Temp 97.8 °F (36.6 °C) (Temporal)   Resp 16   Ht 5' 5.1\" (1.654 m)   Wt 105 kg (232 lb)   SpO2 97%   BMI 38.49 kg/m²     Physical Exam  Constitutional:       Appearance: Normal appearance. She is obese.   HENT:      Head: Normocephalic and atraumatic.      Nose: Nose normal.      Mouth/Throat:      Mouth: Mucous membranes are moist.   Eyes:      Extraocular Movements: Extraocular movements intact.      Pupils: Pupils are equal, round, and reactive to light.   Cardiovascular:      Rate and Rhythm: Normal rate and regular rhythm.      Pulses: Normal pulses.      Heart sounds: Normal heart sounds.   Pulmonary:      Effort: Pulmonary effort is normal.      Breath sounds: Normal breath sounds.   Abdominal:      Palpations: Abdomen is soft.   Musculoskeletal:      Cervical back: Normal range of motion.   Skin:     General: Skin is warm.   Neurological:      General: No focal deficit present.      Mental Status: She is alert and oriented to person, place, and time.   Psychiatric:         Mood and Affect: Mood normal.         Behavior: Behavior normal.           Precious Rivas PA-C  Bariatric Surgery    "

## 2025-05-02 NOTE — PATIENT INSTRUCTIONS
Go to [email or web address] for further information. Instructions on how to inject yourself are located on the website.    - Zebound was sent to your pharmacy. The prior authorization process will been done through our prior authorization team and can take up to 3-4 weeks to process through the insurance.     - Start Zepbound 2.5 mg subcutaneously weekly. After you have taken the second pen, please give me an update, as we will likely increase the dose the next month if you are tolerating it well.     - Side effects of Zepbound include nausea, vomiting, diarrhea, or constipation. Keep an eye on your heart rate while on Zepbound. If you resting heart rate is greater than 100 beats per minutes, please notify me. Please eat small frequent meals to help reduce nausea. Lemon water and saltine crackers may help with this. If you experience fever, nausea/vomiting, and pain radiating to your back this may be a sign of pancreatitis. Please have ED evaluation if this occurs.     - Please hold Zepbound for one week prior to a procedure such as surgery, upper endoscopy, or colonoscopy for risk of aspiration.     - Zepbound can reduce the effectiveness of oral hormonal birth control (birth control pills). Recommend a barrier backup method such as condoms to prevent pregnancy.      Call or message me after 2nd pen for update on symptoms and to increase dose!

## 2025-05-02 NOTE — ASSESSMENT & PLAN NOTE
-- Discussed options of HealthyCORE-Intensive Lifestyle Intervention Program, Very Low Calorie Diet-VLCD, Conservative Program, Alvarez-En-Y Gastric Bypass, and Vertical Sleeve Gastrectomy and the role of weight loss medications.  - Initial weight loss goal of 5-10% weight loss for improved health  - Patient is interested in pursuing Conservative Program  - Screening labs: Diabetic, updated labs already in system  - Calorie goal: 5305-2567 calories per day  - Hx diverticulitis and chrohns, monitor  - Start Mounjaro 2.5mg weekly  - Medication agreement signed: Yes  - Follow up in 4 months  - Dietician: Agreeable, make appt for meal planning in 2-3 weeks

## 2025-05-03 ENCOUNTER — APPOINTMENT (OUTPATIENT)
Dept: LAB | Facility: MEDICAL CENTER | Age: 40
End: 2025-05-03
Attending: FAMILY MEDICINE
Payer: COMMERCIAL

## 2025-05-03 DIAGNOSIS — E11.65 TYPE 2 DIABETES MELLITUS WITH HYPERGLYCEMIA, WITH LONG-TERM CURRENT USE OF INSULIN (HCC): ICD-10-CM

## 2025-05-03 DIAGNOSIS — Z79.4 TYPE 2 DIABETES MELLITUS WITH HYPERGLYCEMIA, WITH LONG-TERM CURRENT USE OF INSULIN (HCC): ICD-10-CM

## 2025-05-03 LAB
ALBUMIN SERPL BCG-MCNC: 4.3 G/DL (ref 3.5–5)
ALP SERPL-CCNC: 75 U/L (ref 34–104)
ALT SERPL W P-5'-P-CCNC: 12 U/L (ref 7–52)
ANION GAP SERPL CALCULATED.3IONS-SCNC: 10 MMOL/L (ref 4–13)
AST SERPL W P-5'-P-CCNC: 11 U/L (ref 13–39)
BASOPHILS # BLD AUTO: 0.14 THOUSANDS/ÂΜL (ref 0–0.1)
BASOPHILS NFR BLD AUTO: 1 % (ref 0–1)
BILIRUB SERPL-MCNC: 0.58 MG/DL (ref 0.2–1)
BUN SERPL-MCNC: 7 MG/DL (ref 5–25)
CALCIUM SERPL-MCNC: 9.4 MG/DL (ref 8.4–10.2)
CHLORIDE SERPL-SCNC: 102 MMOL/L (ref 96–108)
CHOLEST SERPL-MCNC: 145 MG/DL (ref ?–200)
CO2 SERPL-SCNC: 25 MMOL/L (ref 21–32)
CREAT SERPL-MCNC: 0.58 MG/DL (ref 0.6–1.3)
CREAT UR-MCNC: 62.3 MG/DL
EOSINOPHIL # BLD AUTO: 0.26 THOUSAND/ÂΜL (ref 0–0.61)
EOSINOPHIL NFR BLD AUTO: 2 % (ref 0–6)
ERYTHROCYTE [DISTWIDTH] IN BLOOD BY AUTOMATED COUNT: 14.5 % (ref 11.6–15.1)
EST. AVERAGE GLUCOSE BLD GHB EST-MCNC: 189 MG/DL
GFR SERPL CREATININE-BSD FRML MDRD: 114 ML/MIN/1.73SQ M
GLUCOSE P FAST SERPL-MCNC: 163 MG/DL (ref 65–99)
HBA1C MFR BLD: 8.2 %
HCT VFR BLD AUTO: 39.9 % (ref 34.8–46.1)
HDLC SERPL-MCNC: 42 MG/DL
HGB BLD-MCNC: 12.4 G/DL (ref 11.5–15.4)
IMM GRANULOCYTES # BLD AUTO: 0.09 THOUSAND/UL (ref 0–0.2)
IMM GRANULOCYTES NFR BLD AUTO: 1 % (ref 0–2)
LDLC SERPL CALC-MCNC: 74 MG/DL (ref 0–100)
LYMPHOCYTES # BLD AUTO: 4.09 THOUSANDS/ÂΜL (ref 0.6–4.47)
LYMPHOCYTES NFR BLD AUTO: 29 % (ref 14–44)
MCH RBC QN AUTO: 26.5 PG (ref 26.8–34.3)
MCHC RBC AUTO-ENTMCNC: 31.1 G/DL (ref 31.4–37.4)
MCV RBC AUTO: 85 FL (ref 82–98)
MICROALBUMIN UR-MCNC: <7 MG/L
MONOCYTES # BLD AUTO: 1.12 THOUSAND/ÂΜL (ref 0.17–1.22)
MONOCYTES NFR BLD AUTO: 8 % (ref 4–12)
NEUTROPHILS # BLD AUTO: 8.58 THOUSANDS/ÂΜL (ref 1.85–7.62)
NEUTS SEG NFR BLD AUTO: 59 % (ref 43–75)
NRBC BLD AUTO-RTO: 0 /100 WBCS
PLATELET # BLD AUTO: 386 THOUSANDS/UL (ref 149–390)
PMV BLD AUTO: 11.9 FL (ref 8.9–12.7)
POTASSIUM SERPL-SCNC: 4.2 MMOL/L (ref 3.5–5.3)
PROT SERPL-MCNC: 7.5 G/DL (ref 6.4–8.4)
RBC # BLD AUTO: 4.68 MILLION/UL (ref 3.81–5.12)
SODIUM SERPL-SCNC: 137 MMOL/L (ref 135–147)
TRIGL SERPL-MCNC: 143 MG/DL (ref ?–150)
TSH SERPL DL<=0.05 MIU/L-ACNC: 1.09 UIU/ML (ref 0.45–4.5)
WBC # BLD AUTO: 14.28 THOUSAND/UL (ref 4.31–10.16)

## 2025-05-03 PROCEDURE — 85025 COMPLETE CBC W/AUTO DIFF WBC: CPT

## 2025-05-03 PROCEDURE — 82570 ASSAY OF URINE CREATININE: CPT

## 2025-05-03 PROCEDURE — 36415 COLL VENOUS BLD VENIPUNCTURE: CPT

## 2025-05-03 PROCEDURE — 80061 LIPID PANEL: CPT

## 2025-05-03 PROCEDURE — 83036 HEMOGLOBIN GLYCOSYLATED A1C: CPT

## 2025-05-03 PROCEDURE — 82043 UR ALBUMIN QUANTITATIVE: CPT

## 2025-05-03 PROCEDURE — 84443 ASSAY THYROID STIM HORMONE: CPT

## 2025-05-03 PROCEDURE — 80053 COMPREHEN METABOLIC PANEL: CPT

## 2025-05-05 ENCOUNTER — E-CONSULT (OUTPATIENT)
Dept: HEMATOLOGY ONCOLOGY | Facility: MEDICAL CENTER | Age: 40
End: 2025-05-05
Payer: COMMERCIAL

## 2025-05-05 ENCOUNTER — RESULTS FOLLOW-UP (OUTPATIENT)
Dept: FAMILY MEDICINE CLINIC | Facility: CLINIC | Age: 40
End: 2025-05-05

## 2025-05-05 DIAGNOSIS — Z72.0 TOBACCO USE: ICD-10-CM

## 2025-05-05 DIAGNOSIS — D72.829 LEUKOCYTOSIS, UNSPECIFIED TYPE: Primary | ICD-10-CM

## 2025-05-05 DIAGNOSIS — E66.9 OBESITY (BMI 30-39.9): ICD-10-CM

## 2025-05-05 PROCEDURE — 99447 NTRPROF PH1/NTRNET/EHR 11-20: CPT | Performed by: PHYSICIAN ASSISTANT

## 2025-05-05 NOTE — PROGRESS NOTES
E-Consult  Speedy Green 40 y.o. female MRN: [id number]  Encounter Date: 05/05/25      Reason for Consult / Principal Problem: Chronically elevated leukocytosis over the past several years.  No anemia.  No thrombocytosis.  Any advice?     Consulting Provider: Valarie Bermudez PA-C    Requesting Provider: Alexx Barriga,        ASSESSMENT:  Patient is a 40-year-old with type 2 diabetes, obesity, depression, migraines, chronic pain disorder, tobacco abuse.    She has history of chronic, mild leukocytosis dating back to at least 2016.  Leukocytosis is neutrophil predominant.      RECOMMENDATIONS:  Chronic leukocytosis can be due to a variety of etiologies.  This includes smoking, obesity, chronic allergies, autoimmune disorders, chronic infection, primary bone marrow disorder, or normal variant.    This patient is a chronic, active smoker.  This may be the etiology of the mild leukocytosis.    There is also a sizeable amount of data demonstrating that obesity is a state of chronic low-grade inflammation.     There is an observed relative, and sometimes absolute, leukocytosis driven by this inflammatory state. Recognizing this association may prompt clinicians to avoid unnecessary and extensive work-ups by providing reassurance regarding an unexplained, stable and mild, neutrophilic leukocytosis in obese individuals who have the demographic features present in the above studies (e.g. middle-aged females).    The Hematologic Consequences of Obesity - PMC (nih.gov)    I do not believe that patient needs additional workup at this time.  She can be referred to hematology for persistent increase in WBC count, or change in hemoglobin or platelets without clear etiology.    Total time spent 11-20 minutes, >50% of the total time devoted to medical consultative verbal/EMR discussion between providers. Written report will be generated in the EMR.

## 2025-05-07 ENCOUNTER — TELEPHONE (OUTPATIENT)
Dept: BARIATRICS | Facility: CLINIC | Age: 40
End: 2025-05-07

## 2025-05-07 NOTE — TELEPHONE ENCOUNTER
Patient called the RX Refill Line. Message is being forwarded to the office.     Patient called stating her PCP advised her to call and ask for the Mounjaro 2.5 mg be changed/increased to 5 mg because her A1c is elevated.     Please contact patient at [phone]

## 2025-05-07 NOTE — TELEPHONE ENCOUNTER
Pt called requesting prior auth for Mounjaro. Pt was advised will be notified once we have insurance response.

## 2025-05-08 NOTE — TELEPHONE ENCOUNTER
Speedy Green ([place]: [id number]) - [id number]  Mounjaro 2.5MG/0.5ML auto-injectors    Awaiting response

## 2025-05-09 DIAGNOSIS — E66.9 OBESITY (BMI 30-39.9): Primary | ICD-10-CM

## 2025-05-09 DIAGNOSIS — J45.20 MILD INTERMITTENT ASTHMA WITHOUT COMPLICATION: ICD-10-CM

## 2025-05-09 RX ORDER — TIRZEPATIDE 2.5 MG/.5ML
2.5 INJECTION, SOLUTION SUBCUTANEOUS WEEKLY
Qty: 2 ML | Refills: 0 | Status: SHIPPED | OUTPATIENT
Start: 2025-05-09 | End: 2025-06-06

## 2025-05-13 NOTE — TELEPHONE ENCOUNTER
Patient called for an update on the PA for her Mounjaro. She would like someone to call her back from the office to update her on process.

## 2025-05-13 NOTE — TELEPHONE ENCOUNTER
Speedy Green (Key: [id number]) - [id number]  Zepbound 2.5MG/0.5ML pen-injectors    New auth started with new order

## 2025-05-16 ENCOUNTER — HOSPITAL ENCOUNTER (OUTPATIENT)
Dept: MAMMOGRAPHY | Facility: HOSPITAL | Age: 40
Discharge: HOME | End: 2025-05-16
Attending: FAMILY MEDICINE
Payer: COMMERCIAL

## 2025-05-16 DIAGNOSIS — Z12.31 ENCOUNTER FOR SCREENING MAMMOGRAM FOR BREAST CANCER: ICD-10-CM

## 2025-05-16 PROCEDURE — 77063 BREAST TOMOSYNTHESIS BI: CPT

## 2025-05-16 PROCEDURE — 77067 SCR MAMMO BI INCL CAD: CPT

## 2025-05-23 ENCOUNTER — RESULTS FOLLOW-UP (OUTPATIENT)
Dept: FAMILY MEDICINE CLINIC | Facility: CLINIC | Age: 40
End: 2025-05-23

## 2025-05-23 ENCOUNTER — OFFICE VISIT (OUTPATIENT)
Dept: FAMILY MEDICINE CLINIC | Facility: CLINIC | Age: 40
End: 2025-05-23
Payer: COMMERCIAL

## 2025-05-23 VITALS
SYSTOLIC BLOOD PRESSURE: 142 MMHG | BODY MASS INDEX: 37.22 KG/M2 | DIASTOLIC BLOOD PRESSURE: 80 MMHG | WEIGHT: 231.6 LBS | OXYGEN SATURATION: 97 % | HEART RATE: 111 BPM | HEIGHT: 66 IN | TEMPERATURE: 98 F

## 2025-05-23 DIAGNOSIS — G43.809 OTHER MIGRAINE WITHOUT STATUS MIGRAINOSUS, NOT INTRACTABLE: ICD-10-CM

## 2025-05-23 DIAGNOSIS — Z86.2 H/O LEUKOCYTOSIS: ICD-10-CM

## 2025-05-23 DIAGNOSIS — E66.9 OBESITY (BMI 30-39.9): ICD-10-CM

## 2025-05-23 DIAGNOSIS — L71.9 ROSACEA: ICD-10-CM

## 2025-05-23 DIAGNOSIS — R92.8 ABNORMALITY OF BOTH BREASTS ON SCREENING MAMMOGRAM: ICD-10-CM

## 2025-05-23 DIAGNOSIS — J45.20 MILD INTERMITTENT ASTHMA WITHOUT COMPLICATION: ICD-10-CM

## 2025-05-23 DIAGNOSIS — Z87.19 HISTORY OF DIVERTICULITIS: ICD-10-CM

## 2025-05-23 DIAGNOSIS — Z79.4 TYPE 2 DIABETES MELLITUS WITH HYPERGLYCEMIA, WITH LONG-TERM CURRENT USE OF INSULIN (HCC): Primary | ICD-10-CM

## 2025-05-23 DIAGNOSIS — E11.65 TYPE 2 DIABETES MELLITUS WITH HYPERGLYCEMIA, WITH LONG-TERM CURRENT USE OF INSULIN (HCC): Primary | ICD-10-CM

## 2025-05-23 DIAGNOSIS — Z72.0 TOBACCO USE: ICD-10-CM

## 2025-05-23 DIAGNOSIS — G89.4 CHRONIC PAIN DISORDER: ICD-10-CM

## 2025-05-23 DIAGNOSIS — R03.0 ELEVATED BLOOD PRESSURE READING: ICD-10-CM

## 2025-05-23 LAB
LEFT EYE DIABETIC RETINOPATHY: NORMAL
LEFT EYE IMAGE QUALITY: NORMAL
LEFT EYE MACULAR EDEMA: NORMAL
LEFT EYE OTHER RETINOPATHY: NORMAL
RIGHT EYE DIABETIC RETINOPATHY: NORMAL
RIGHT EYE IMAGE QUALITY: NORMAL
RIGHT EYE MACULAR EDEMA: NORMAL
RIGHT EYE OTHER RETINOPATHY: NORMAL
SEVERITY (EYE EXAM): NORMAL

## 2025-05-23 PROCEDURE — 99214 OFFICE O/P EST MOD 30 MIN: CPT | Performed by: FAMILY MEDICINE

## 2025-05-23 NOTE — ASSESSMENT & PLAN NOTE
I have referred the patient to weight management.  We are trying to get a GLP agonist agent approved.  We are having insurance difficulty.

## 2025-05-23 NOTE — ASSESSMENT & PLAN NOTE
Lab Results   Component Value Date    HGBA1C 8.2 (H) 05/03/2025     Was trying to get a GLP agonist agent approved such as Mounjaro.  The insurance company has been giving us difficulty.  The patient has been on metformin in the past but is not on it currently.  Stopped while she was pregnant and she was switched to insulin.  She has a history of gestational diabetes.Orders:    [facility] Diabetic eye exam    metFORMIN (GLUCOPHAGE) 1000 MG tablet; Take 1 tablet (1,000 mg total) by mouth 2 (two) times a day with meals    Hemoglobin A1C; Future    Comprehensive metabolic panel; Future    CBC and differential; Future    Albumin / creatinine urine ratio; Future    Lipid Panel with Direct LDL reflex; Future    TSH, 3rd generation with Free T4 reflex; Future

## 2025-05-23 NOTE — ASSESSMENT & PLAN NOTE
Secondary to cervical spine pain and lumbar spine pain which is chronic.  Patient has seen pain management.  I believe it was in [place].  Patient has been treated with medications and therapeutic injections.  X-rays were ordered and will be done.

## 2025-05-23 NOTE — PROGRESS NOTES
Name: Speedy Green      : 1985      MRN: [id number]  Encounter Provider: Alexx Barriga DO  Encounter Date: 2025   Encounter department: [facility]  :  Assessment & Plan  Type 2 diabetes mellitus with hyperglycemia, with long-term current use of insulin ([facility])    Lab Results   Component Value Date    HGBA1C 8.2 (H) 2025     Was trying to get a GLP agonist agent approved such as Mounjaro.  The insurance company has been giving us difficulty.  The patient has been on metformin in the past but is not on it currently.  Stopped while she was pregnant and she was switched to insulin.  She has a history of gestational diabetes.Orders:    [facility] Diabetic eye exam    metFORMIN (GLUCOPHAGE) 1000 MG tablet; Take 1 tablet (1,000 mg total) by mouth 2 (two) times a day with meals    Hemoglobin A1C; Future    Comprehensive metabolic panel; Future    CBC and differential; Future    Albumin / creatinine urine ratio; Future    Lipid Panel with Direct LDL reflex; Future    TSH, 3rd generation with Free T4 reflex; Future    Other migraine without status migrainosus, not intractable  Currently stable.  Uses Excedrin if needed.       Chronic pain disorder  Secondary to cervical spine pain and lumbar spine pain which is chronic.  Patient has seen pain management.  I believe it was in [place].  Patient has been treated with medications and therapeutic injections.  X-rays were ordered and will be done.       Tobacco use  Advised cessation.  Readdress at each visit.       History of diverticulitis  This is by history.  Last emergency room visit was 2024.  Patient has had a colonoscopy in the past.  This was done while living in [place].  I am trying to get records.  No symptoms currently.       Mild intermittent asthma without complication  Currently stable.  Uses albuterol sparingly.       Obesity (BMI 30-39.9)    I have referred the patient to weight management.  We are trying to get a GLP  "agonist agent approved.  We are having insurance difficulty.       H/O leukocytosis  I reached out to hematology who advised that this is not from a myeloproliferative problem.  They believe no further workup is needed.  No anemia.  No thrombocytopenia.       Abnormality of both breasts on screening mammogram  The mammogram showed focal asymmetry in the right breast and the left breast.  Diagnostic mammogram and ultrasound were advised.  They will be scheduled.       Rosacea    Orders:    metroNIDAZOLE (METROCREAM) 0.75 % cream; Apply topically 2 (two) times a day    Elevated blood pressure reading  Patient's blood pressures at previous office visits have been normal.  Patient has no history of hypertension.  The patient was rushing to get here from work.  I will continue to monitor.  No need for medications at this time.              History of Present Illness   Patient is a pleasant 40-year-old female who presents today for follow-up and chronic disease management.      Review of Systems   Constitutional:  Negative for chills and fever.   HENT:  Negative for ear pain and sore throat.    Eyes:  Negative for pain and visual disturbance.   Respiratory:  Negative for cough and shortness of breath.    Cardiovascular:  Negative for chest pain and palpitations.   Gastrointestinal:  Negative for abdominal pain and vomiting.   Genitourinary:  Negative for dysuria and hematuria.   Musculoskeletal:  Negative for arthralgias and back pain.   Skin:  Negative for color change and rash.   Neurological:  Negative for seizures and syncope.   Psychiatric/Behavioral: Negative.     All other systems reviewed and are negative.      Objective   /80   Pulse (!) 111   Temp 98 °F (36.7 °C)   Ht 5' 5.75\" (1.67 m)   Wt 105 kg (231 lb 9.6 oz)   SpO2 97%   BMI 37.67 kg/m²      Physical Exam  Vitals and nursing note reviewed.   Constitutional:       General: She is not in acute distress.     Appearance: She is well-developed. She " is not ill-appearing, toxic-appearing or diaphoretic.   HENT:      Head: Normocephalic and atraumatic.      Mouth/Throat:      Mouth: Mucous membranes are moist.     Eyes:      Conjunctiva/sclera: Conjunctivae normal.       Cardiovascular:      Rate and Rhythm: Normal rate and regular rhythm.      Pulses: Normal pulses. no weak pulses.           Dorsalis pedis pulses are 2+ on the right side and 2+ on the left side.        Posterior tibial pulses are 2+ on the right side and 2+ on the left side.      Heart sounds: Normal heart sounds. No murmur heard.  Pulmonary:      Effort: Pulmonary effort is normal. No respiratory distress.      Breath sounds: Normal breath sounds. No wheezing or rales.   Abdominal:      Palpations: Abdomen is soft.      Tenderness: There is no abdominal tenderness. There is no guarding or rebound.     Musculoskeletal:         General: No swelling.      Cervical back: Neck supple.   Feet:      Right foot:      Skin integrity: No ulcer, skin breakdown, erythema, warmth, callus or dry skin.      Left foot:      Skin integrity: No ulcer, skin breakdown, erythema, warmth, callus or dry skin.     Skin:     General: Skin is warm and dry.      Capillary Refill: Capillary refill takes less than 2 seconds.     Neurological:      General: No focal deficit present.      Mental Status: She is alert and oriented to person, place, and time.     Psychiatric:         Mood and Affect: Mood normal.         Behavior: Behavior normal.         Thought Content: Thought content normal.     Patient's shoes and socks removed.    Right Foot/Ankle   Right Foot Inspection  Skin Exam: skin normal and skin intact. No dry skin, no warmth, no callus, no erythema, no maceration, no abnormal color, no pre-ulcer, no ulcer and no callus.     Toe Exam: ROM and strength within normal limits.     Sensory   Monofilament testing: intact    Vascular  Capillary refills: < 3 seconds  The right DP pulse is 2+. The right PT pulse is 2+.      Left Foot/Ankle  Left Foot Inspection  Skin Exam: skin normal and skin intact. No dry skin, no warmth, no erythema, no maceration, normal color, no pre-ulcer, no ulcer and no callus.     Toe Exam: ROM and strength within normal limits.     Sensory   Monofilament testing: intact    Vascular  Capillary refills: < 3 seconds  The left DP pulse is 2+. The left PT pulse is 2+.     Assign Risk Category  No deformity present  No loss of protective sensation  No weak pulses  Risk: 0

## 2025-05-23 NOTE — ASSESSMENT & PLAN NOTE
This is by history.  Last emergency room visit was October 2024.  Patient has had a colonoscopy in the past.  This was done while living in [place].  I am trying to get records.  No symptoms currently.

## 2025-05-23 NOTE — ASSESSMENT & PLAN NOTE
I reached out to hematology who advised that this is not from a myeloproliferative problem.  They believe no further workup is needed.  No anemia.  No thrombocytopenia.

## 2025-05-30 ENCOUNTER — OFFICE VISIT (OUTPATIENT)
Dept: URGENT CARE | Facility: MEDICAL CENTER | Age: 40
End: 2025-05-30
Payer: COMMERCIAL

## 2025-05-30 ENCOUNTER — APPOINTMENT (OUTPATIENT)
Dept: RADIOLOGY | Facility: MEDICAL CENTER | Age: 40
End: 2025-05-30
Payer: COMMERCIAL

## 2025-05-30 ENCOUNTER — RESULTS FOLLOW-UP (OUTPATIENT)
Dept: URGENT CARE | Facility: MEDICAL CENTER | Age: 40
End: 2025-05-30

## 2025-05-30 VITALS
RESPIRATION RATE: 18 BRPM | SYSTOLIC BLOOD PRESSURE: 128 MMHG | WEIGHT: 228 LBS | HEART RATE: 93 BPM | BODY MASS INDEX: 37.08 KG/M2 | DIASTOLIC BLOOD PRESSURE: 70 MMHG | OXYGEN SATURATION: 98 % | TEMPERATURE: 97.5 F

## 2025-05-30 DIAGNOSIS — S92.425A CLOSED NONDISPLACED FRACTURE OF DISTAL PHALANX OF LEFT GREAT TOE, INITIAL ENCOUNTER: Primary | ICD-10-CM

## 2025-05-30 DIAGNOSIS — S99.922A INJURY OF TOE ON LEFT FOOT, INITIAL ENCOUNTER: ICD-10-CM

## 2025-05-30 PROCEDURE — 73660 X-RAY EXAM OF TOE(S): CPT

## 2025-05-30 PROCEDURE — 99213 OFFICE O/P EST LOW 20 MIN: CPT

## 2025-05-30 PROCEDURE — 29515 APPLICATION SHORT LEG SPLINT: CPT

## 2025-05-30 NOTE — PATIENT INSTRUCTIONS
Tylenol/Ibuprofen for pain  Wear boot  Wear shoe arch support for foot injuries (ex: superfeet insoles)  Ice 20 minutes 3-4 times per day for 3 days  Insulate the skin from the ice to prevent frostbite  Rest and Elevate  Follow up with orthopedic

## 2025-05-30 NOTE — LETTER
May 30, 2025     Patient: Speedy Green   YOB: 1985   Date of Visit: 5/30/2025       To Whom it May Concern:    Speedy Green was seen in my clinic on 5/30/2025. She may return to work on 06/02/2025.    If you have any questions or concerns, please don't hesitate to call.         Sincerely,          TIERRA Mazariegos        CC: No Recipients

## 2025-05-30 NOTE — PROGRESS NOTES
Orthopedic injury treatment    Date/Time: 2025 12:00 PM    Performed by: TIERRA Mazariegos  Authorized by: TIERRA Mazariegos    Universal Protocol:  Consent: Verbal consent obtained  Patient understanding: patient states understanding of the procedure being performed    Injury location:  Foot  Location details:  Left foot  Neurovascular status: Neurovascularly intact    Immobilization:  Splint  Splint type:  Short leg  Neurovascular status: Neurovascularly intact      [facility]  Name: Speedy Green      : 1985      MRN: [id number]  Encounter Provider: TIERRA Mazariegos  Encounter Date: 2025   Encounter department: [facility] [place]  :  Assessment & Plan  Injury of toe on left foot, initial encounter    Orders:    XR toe left great min 2 views    Ambulatory Referral to Orthopedic Surgery; Future    Orthopedic injury treatment    Closed nondisplaced fracture of distal phalanx of left great toe, initial encounter             Patient Instructions  Follow up with PCP in 3-5 days.  Proceed to  ER if symptoms worsen.    If tests are performed, our office will contact you with results only if changes need to made to the care plan discussed with you at the visit. [facility]    Chief Complaint:   Chief Complaint   Patient presents with    Toe Injury     Pt accidentally dropped a wooden pallet on her left great toe @ 5pm yesterday.Pt is diabetic.     History of Present Illness   Toe Pain   The incident occurred 12 to 24 hours ago. The incident occurred at home. The injury mechanism was a direct blow. The pain is present in the left toes. The pain is at a severity of 10/10. The pain is severe. The pain has been Constant since onset. Associated symptoms include numbness. She reports no foreign bodies present. The symptoms are aggravated by movement, palpation and weight bearing. She has tried elevation for the symptoms. The treatment  provided no relief.         Review of Systems   Constitutional:  Negative for chills and fever.   HENT:  Negative for ear pain and sore throat.    Eyes:  Negative for pain and visual disturbance.   Respiratory:  Negative for cough and shortness of breath.    Cardiovascular:  Negative for chest pain and palpitations.   Gastrointestinal:  Negative for abdominal pain and vomiting.   Genitourinary:  Negative for dysuria and hematuria.   Musculoskeletal:  Negative for arthralgias and back pain.   Skin:  Negative for color change and rash.   Neurological:  Positive for numbness. Negative for seizures and syncope.   All other systems reviewed and are negative.    Past Medical History   Past Medical History[1]  Past Surgical History[2]  Family History[3]  she reports that she has been smoking cigarettes. She has never used smokeless tobacco. She reports that she does not currently use alcohol. She reports that she does not use drugs.  Current Outpatient Medications   Medication Instructions    albuterol (PROVENTIL HFA,VENTOLIN HFA) 90 mcg/act inhaler 2 puffs, Inhalation, Every 4 hours PRN    metFORMIN (GLUCOPHAGE) 1,000 mg, Oral, 2 times daily with meals    metroNIDAZOLE (METROCREAM) 0.75 % cream Topical, 2 times daily   Allergies[4]     Objective   /70 (BP Location: Left arm, Patient Position: Sitting, Cuff Size: Large)   Pulse 93   Temp 97.5 °F (36.4 °C) (Temporal)   Resp 18   Wt 103 kg (228 lb)   LMP 05/14/2025 (Approximate) Comment: pt had bilat fallopian tubes removed 2 years ago.  SpO2 98%   BMI 37.08 kg/m²      Physical Exam  Vitals and nursing note reviewed.   Constitutional:       General: She is not in acute distress.     Appearance: She is well-developed.   HENT:      Head: Normocephalic and atraumatic.     Eyes:      Conjunctiva/sclera: Conjunctivae normal.       Cardiovascular:      Rate and Rhythm: Normal rate and regular rhythm.      Heart sounds: No murmur heard.  Pulmonary:      Effort:  "Pulmonary effort is normal. No respiratory distress.      Breath sounds: Normal breath sounds.   Abdominal:      Palpations: Abdomen is soft.      Tenderness: There is no abdominal tenderness.     Musculoskeletal:         General: No swelling.      Cervical back: Neck supple.      Left foot: Swelling and tenderness present.      Comments: Left big toe     Skin:     General: Skin is warm and dry.      Capillary Refill: Capillary refill takes less than 2 seconds.     Neurological:      Mental Status: She is alert.     Psychiatric:         Mood and Affect: Mood normal.         Portions of the record may have been created with voice recognition software.  Occasional wrong word or \"sound a like\" substitutions may have occurred due to the inherent limitations of voice recognition software.  Read the chart carefully and recognize, using context, where substitutions have occurred.         [1]   Past Medical History:  Diagnosis Date    Anxiety     Arachnoid cyst     Chronic pain     back    Depression     Diabetes mellitus (HCC)     Hypertension     Migraine     Mitral valve prolapse     Psychiatric disorder     Anxiety and Depression   [2]   Past Surgical History:  Procedure Laterality Date     SECTION      CHOLECYSTECTOMY      CRANIOTOMY Right 3/1/2017    Procedure: RIGHT IMAGE GUIDED RETROMASTOID CRANIOTOMY FOR FENESTRATION AND PLACEMENT OF INTERNAL CYSTO-SUBARACHNOID SHUNT;  Surgeon: Samuel Del Rosario MD;  Location: [facility];  Service:     MOUTH SURGERY      WISDOM TOOTH EXTRACTION     [3]   Family History  Problem Relation Name Age of Onset    Rheum arthritis Mother Anna     Hypertension Mother Anna     Breast cancer Mother Anna 30    Polycystic ovary syndrome Mother Anna     No Known Problems Daughter      Diabetes Maternal Grandmother      Thyroid disease Maternal Grandmother      Stroke Maternal Grandmother      Hypertension Maternal Grandmother      Rheum arthritis Maternal Grandfather      Lung " cancer Maternal Grandfather      Hypertension Maternal Grandfather      COPD Maternal Grandfather      No Known Problems Brother Jeovany     Asthma Son Ismael     Asthma Son Tim     Ear Disease Son Eliud     No Known Problems Maternal Aunt     [4] No Known Allergies

## 2025-06-02 ENCOUNTER — TELEPHONE (OUTPATIENT)
Dept: MAMMOGRAPHY | Facility: HOSPITAL | Age: 40
End: 2025-06-02

## 2025-06-26 ENCOUNTER — HOSPITAL ENCOUNTER (OUTPATIENT)
Dept: MAMMOGRAPHY | Facility: HOSPITAL | Age: 40
Discharge: HOME | End: 2025-06-26
Attending: FAMILY MEDICINE
Payer: COMMERCIAL

## 2025-06-26 ENCOUNTER — HOSPITAL ENCOUNTER (OUTPATIENT)
Dept: ULTRASOUND IMAGING | Facility: HOSPITAL | Age: 40
Discharge: HOME | End: 2025-06-26
Attending: FAMILY MEDICINE
Payer: COMMERCIAL

## 2025-06-26 VITALS — WEIGHT: 228 LBS | HEIGHT: 66 IN | BODY MASS INDEX: 36.64 KG/M2

## 2025-06-26 DIAGNOSIS — R92.8 ABNORMAL SCREENING MAMMOGRAM: ICD-10-CM

## 2025-06-26 PROCEDURE — 77066 DX MAMMO INCL CAD BI: CPT

## 2025-06-26 PROCEDURE — G0279 TOMOSYNTHESIS, MAMMO: HCPCS

## 2025-06-26 PROCEDURE — 76642 ULTRASOUND BREAST LIMITED: CPT

## 2025-07-29 ENCOUNTER — OFFICE VISIT (OUTPATIENT)
Dept: URGENT CARE | Facility: MEDICAL CENTER | Age: 40
End: 2025-07-29
Payer: COMMERCIAL

## 2025-07-29 VITALS
TEMPERATURE: 97.4 F | WEIGHT: 224 LBS | SYSTOLIC BLOOD PRESSURE: 142 MMHG | OXYGEN SATURATION: 98 % | HEART RATE: 106 BPM | BODY MASS INDEX: 36.43 KG/M2 | DIASTOLIC BLOOD PRESSURE: 92 MMHG

## 2025-07-29 DIAGNOSIS — N94.6 MENSES PAINFUL: Primary | ICD-10-CM

## 2025-07-29 DIAGNOSIS — N94.6 DYSMENORRHEA: ICD-10-CM

## 2025-07-29 PROCEDURE — 99213 OFFICE O/P EST LOW 20 MIN: CPT | Performed by: NURSE PRACTITIONER

## 2025-07-31 ENCOUNTER — HOSPITAL ENCOUNTER (OUTPATIENT)
Dept: MAMMOGRAPHY | Facility: HOSPITAL | Age: 40
Discharge: HOME | End: 2025-07-31

## 2025-07-31 ENCOUNTER — HOSPITAL ENCOUNTER (OUTPATIENT)
Dept: ULTRASOUND IMAGING | Facility: HOSPITAL | Age: 40
Discharge: HOME | End: 2025-07-31
Attending: FAMILY MEDICINE
Payer: COMMERCIAL

## 2025-07-31 VITALS — HEIGHT: 66 IN | BODY MASS INDEX: 36 KG/M2 | WEIGHT: 224 LBS

## 2025-07-31 DIAGNOSIS — R92.8 ABNORMAL MAMMOGRAM: ICD-10-CM

## 2025-07-31 DIAGNOSIS — Z00.6 ENCOUNTER FOR EXAMINATION FOR NORMAL COMPARISON OR CONTROL IN CLINICAL RESEARCH PROGRAM: ICD-10-CM

## 2025-07-31 PROCEDURE — 88342 IMHCHEM/IMCYTCHM 1ST ANTB: CPT | Performed by: PATHOLOGY

## 2025-07-31 PROCEDURE — 88305 TISSUE EXAM BY PATHOLOGIST: CPT | Performed by: PATHOLOGY

## 2025-07-31 PROCEDURE — 76942 ECHO GUIDE FOR BIOPSY: CPT

## 2025-07-31 PROCEDURE — 38505 NEEDLE BIOPSY LYMPH NODES: CPT

## 2025-07-31 PROCEDURE — 88341 IMHCHEM/IMCYTCHM EA ADD ANTB: CPT | Performed by: PATHOLOGY

## 2025-07-31 PROCEDURE — 19083 BX BREAST 1ST LESION US IMAG: CPT

## 2025-07-31 PROCEDURE — A4648 IMPLANTABLE TISSUE MARKER: HCPCS

## 2025-07-31 RX ORDER — LIDOCAINE HYDROCHLORIDE 10 MG/ML
5 INJECTION, SOLUTION EPIDURAL; INFILTRATION; INTRACAUDAL; PERINEURAL
Status: DISCONTINUED | OUTPATIENT
Start: 2025-07-31 | End: 2025-08-01 | Stop reason: HOSPADM

## 2025-07-31 RX ORDER — LIDOCAINE HYDROCHLORIDE 10 MG/ML
5 INJECTION, SOLUTION EPIDURAL; INFILTRATION; INTRACAUDAL; PERINEURAL ONCE
Status: COMPLETED | OUTPATIENT
Start: 2025-07-31 | End: 2025-07-31

## 2025-07-31 RX ADMIN — LIDOCAINE HYDROCHLORIDE 5 ML: 10 INJECTION, SOLUTION EPIDURAL; INFILTRATION; INTRACAUDAL; PERINEURAL at 11:41

## 2025-07-31 RX ADMIN — LIDOCAINE HYDROCHLORIDE 5 ML: 10 INJECTION, SOLUTION EPIDURAL; INFILTRATION; INTRACAUDAL; PERINEURAL at 11:35

## 2025-08-01 VITALS — DIASTOLIC BLOOD PRESSURE: 86 MMHG | HEART RATE: 99 BPM | SYSTOLIC BLOOD PRESSURE: 138 MMHG

## 2025-08-04 ENCOUNTER — TELEPHONE (OUTPATIENT)
Dept: MAMMOGRAPHY | Facility: CLINIC | Age: 40
End: 2025-08-04

## 2025-08-04 DIAGNOSIS — E11.65 TYPE 2 DIABETES MELLITUS WITH HYPERGLYCEMIA, WITH LONG-TERM CURRENT USE OF INSULIN (HCC): ICD-10-CM

## 2025-08-04 DIAGNOSIS — Z79.4 TYPE 2 DIABETES MELLITUS WITH HYPERGLYCEMIA, WITH LONG-TERM CURRENT USE OF INSULIN (HCC): ICD-10-CM

## 2025-08-04 PROCEDURE — 88342 IMHCHEM/IMCYTCHM 1ST ANTB: CPT | Performed by: PATHOLOGY

## 2025-08-04 PROCEDURE — 88341 IMHCHEM/IMCYTCHM EA ADD ANTB: CPT | Performed by: PATHOLOGY

## 2025-08-04 PROCEDURE — 88305 TISSUE EXAM BY PATHOLOGIST: CPT | Performed by: PATHOLOGY

## 2025-08-22 ENCOUNTER — TELEPHONE (OUTPATIENT)
Dept: FAMILY MEDICINE CLINIC | Facility: CLINIC | Age: 40
End: 2025-08-22

## (undated) DEVICE — SUT VICRYL PLUS 3-0 RB-1 CR/8 18 IN VCP713D

## (undated) DEVICE — MARKER REFLECTIVE RADIOPAQUE SPHERE

## (undated) DEVICE — ANTIBACTERIAL VIOLET BRAIDED (POLYGLACTIN 910), SYNTHETIC ABSORBABLE SUTURE: Brand: COATED VICRYL

## (undated) DEVICE — SUT PROLENE 6-0 BV130 30 IN 8709H

## (undated) DEVICE — ADHESIVE SKN CLSR HISTOACRYL FLEX 0.5ML LF

## (undated) DEVICE — BETADINE OINTMENT FOIL PACK

## (undated) DEVICE — MAYFIELD® DISPOSABLE ADULT SKULL PIN (PLASTIC BASE): Brand: MAYFIELD®

## (undated) DEVICE — SPONGE PVP SCRUB WING STERILE

## (undated) DEVICE — SUT NUROLON 4-0 TF CR/8 18 IN C584D

## (undated) DEVICE — SPECIMEN CONTAINER STERILE PEEL PACK

## (undated) DEVICE — PETRI DISH STERILE

## (undated) DEVICE — FLOSEAL MATRIX IS INDICATED IN SURGICAL PROCEDURES (OTHER THAN IN OPHTHALMIC) AS AN ADJUNCT TO HEMOSTASIS WHEN CONTROL OF BLEEDING BY LIGATURE OR CONVENTIONAL PROCEDURES IS INEFFECTIVE OR IMPRACTICAL.: Brand: FLOSEAL HEMOSTATIC MATRIX

## (undated) DEVICE — NEURO PATTIES 1/4 X 1/4

## (undated) DEVICE — DRAPE INTESTINAL ISOLATION BAG

## (undated) DEVICE — TRAY FOLEY 16FR URIMETER SURESTEP

## (undated) DEVICE — TIBURON SPLIT SHEET: Brand: CONVERTORS

## (undated) DEVICE — 3M™ STERI-STRIP™ REINFORCED ADHESIVE SKIN CLOSURES, R1547, 1/2 IN X 4 IN (12 MM X 100 MM), 6 STRIPS/ENVELOPE: Brand: 3M™ STERI-STRIP™

## (undated) DEVICE — GLOVE INDICATOR PI UNDERGLOVE SZ 8.5 BLUE

## (undated) DEVICE — GOWN,SLEEVE,STERILE,W/CSR WRAP,1/P: Brand: MEDLINE

## (undated) DEVICE — 3M™ IOBAN™ 2 ANTIMICROBIAL INCISE DRAPE 6650EZ: Brand: IOBAN™ 2

## (undated) DEVICE — SUT PROLENE 3-0 V-7 36 IN 8976H

## (undated) DEVICE — DRAPE MICROSCOPE OPMI PENTERO

## (undated) DEVICE — X-RAY DETECTABLE SPONGES,16 PLY: Brand: VISTEC

## (undated) DEVICE — DRAPE FLUID WARMER (BIRD BATH)

## (undated) DEVICE — IV CATH INTROCAN 18G X 1 1/4 SAFETY

## (undated) DEVICE — GELFOAM PLUS IS AN IMPLANTABLE HEMOSTAT SUPPLIED AS A READY TO USE MEDICAL DEVICE KIT CONTAINING GELFOAM® STERILE SPONGE, THROMBIN HUMAN LYOPHILIZED POWDER, TWO 10 ML PREFILLED SALINE SYRINGES 0.9 PERCENT SODIUM CHLORIDE INJECTION USP, AND A VIAL ACCESS DEVICE: Brand: GELFOAM PLUS

## (undated) DEVICE — DURASEAL 5ML

## (undated) DEVICE — TOOL F2/8TA23 LEGEND 8CM 2.3MM TAPER: Brand: MIDAS REX™

## (undated) DEVICE — SUT MONOCRYL 4-0 PS-2 27 IN Y426H

## (undated) DEVICE — GLOVE SRG BIOGEL 8

## (undated) DEVICE — SURGICEL 4 X 8

## (undated) DEVICE — PROXIMATE PLUS MD MULTI-DIRECTIONAL RELEASE SKIN STAPLERS CONTAINS 35 STAINLESS STEEL STAPLES APPROXIMATE CLOSED DIMENSIONS: 6.9MM X 3.9MM WIDE: Brand: PROXIMATE

## (undated) DEVICE — BIPOLAR IRRIGATOR INTEGRATED TUBING AND BIPOLAR CORD SET, DISPOSABLE

## (undated) DEVICE — PACK CRANIOTOMY PBDS RF

## (undated) DEVICE — DRAPE CAMERA/LASER

## (undated) DEVICE — TELFA 1/2" X 3": Brand: TELFA

## (undated) DEVICE — DRAPE SHEET THREE QUARTER

## (undated) DEVICE — PREP SURGICAL PURPREP 26ML

## (undated) DEVICE — INTENDED FOR TISSUE SEPARATION, AND OTHER PROCEDURES THAT REQUIRE A SHARP SURGICAL BLADE TO PUNCTURE OR CUT.: Brand: BARD-PARKER SAFETY BLADES SIZE 15, STERILE

## (undated) DEVICE — INTENDED FOR TISSUE SEPARATION, AND OTHER PROCEDURES THAT REQUIRE A SHARP SURGICAL BLADE TO PUNCTURE OR CUT.: Brand: BARD-PARKER ® CARBON RIB-BACK BLADES

## (undated) DEVICE — LIGHT HANDLE COVER CAMERA DISP

## (undated) DEVICE — NEURO PATTIES 1/2 X 1/2

## (undated) DEVICE — TOOL 9MH30 LEGEND 9CM 3MM MH: Brand: MIDAS REX